# Patient Record
Sex: MALE | Race: WHITE | NOT HISPANIC OR LATINO | Employment: FULL TIME | URBAN - METROPOLITAN AREA
[De-identification: names, ages, dates, MRNs, and addresses within clinical notes are randomized per-mention and may not be internally consistent; named-entity substitution may affect disease eponyms.]

---

## 2017-04-19 ENCOUNTER — HOSPITAL ENCOUNTER (OUTPATIENT)
Facility: MEDICAL CENTER | Age: 22
End: 2017-04-19
Attending: PHYSICIAN ASSISTANT
Payer: COMMERCIAL

## 2017-04-19 ENCOUNTER — OFFICE VISIT (OUTPATIENT)
Dept: URGENT CARE | Facility: PHYSICIAN GROUP | Age: 22
End: 2017-04-19
Payer: COMMERCIAL

## 2017-04-19 VITALS
TEMPERATURE: 99 F | SYSTOLIC BLOOD PRESSURE: 122 MMHG | RESPIRATION RATE: 16 BRPM | HEART RATE: 66 BPM | OXYGEN SATURATION: 100 % | BODY MASS INDEX: 24.98 KG/M2 | WEIGHT: 179 LBS | DIASTOLIC BLOOD PRESSURE: 88 MMHG

## 2017-04-19 DIAGNOSIS — J02.9 EXUDATIVE PHARYNGITIS: ICD-10-CM

## 2017-04-19 LAB
INT CON NEG: NEGATIVE
INT CON POS: POSITIVE
S PYO AG THROAT QL: NORMAL

## 2017-04-19 PROCEDURE — 99214 OFFICE O/P EST MOD 30 MIN: CPT | Performed by: PHYSICIAN ASSISTANT

## 2017-04-19 PROCEDURE — 87070 CULTURE OTHR SPECIMN AEROBIC: CPT

## 2017-04-19 PROCEDURE — 87880 STREP A ASSAY W/OPTIC: CPT | Performed by: PHYSICIAN ASSISTANT

## 2017-04-19 RX ORDER — AMOXICILLIN 875 MG/1
875 TABLET, COATED ORAL 2 TIMES DAILY
Qty: 20 TAB | Refills: 0 | Status: SHIPPED | OUTPATIENT
Start: 2017-04-19 | End: 2018-01-30

## 2017-04-19 NOTE — Clinical Note
April 19, 2017         Patient: Keanu Trivedi   YOB: 1995   Date of Visit: 4/19/2017           To Whom it May Concern:    Keanu Trivedi was seen in my clinic on 4/19/2017.  Please excuse him from missed school today    If you have any questions or concerns, please don't hesitate to call.        Sincerely,           Silvia Spicer PA-C  Electronically Signed

## 2017-04-19 NOTE — MR AVS SNAPSHOT
Keanu Trivedi   2017 6:15 PM   Office Visit   MRN: [id number]    Department:  [facility]   Dept Phone:  [phone]    Description:  Male : 1995   Provider:  Silvia Spicer PA-C           Reason for Visit     Pharyngitis sore throat x 6 days      Allergies as of 2017     No Known Allergies      You were diagnosed with     Exudative pharyngitis   [[id number]]         Vital Signs     Blood Pressure Pulse Temperature Respirations Weight Oxygen Saturation    122/88 mmHg 66 37.2 °C (99 °F) 16 81.194 kg (179 lb) 100%    Smoking Status                   Never Smoker            Basic Information     Date Of Birth Sex Race Ethnicity Preferred Language    1995 Male White Non- English      Health Maintenance        Date Due Completion Dates    IMM HEP B VACCINE (1 of 3 - Primary Series) 1995 ---    IMM HEP A VACCINE (1 of 2 - Standard Series) 1996 ---    IMM HPV VACCINE (1 of 3 - Male 3 Dose Series) 2006 ---    IMM VARICELLA (CHICKENPOX) VACCINE (1 of 2 - 2 Dose Adolescent Series) 2008 ---    IMM DTaP/Tdap/Td Vaccine (1 - Tdap) 2014 ---            Current Immunizations     No immunizations on file.      Below and/or attached are the medications your provider expects you to take. Review all of your home medications and newly ordered medications with your provider and/or pharmacist. Follow medication instructions as directed by your provider and/or pharmacist. Please keep your medication list with you and share with your provider. Update the information when medications are discontinued, doses are changed, or new medications (including over-the-counter products) are added; and carry medication information at all times in the event of emergency situations     Allergies:  No Known Allergies          Medications  Valid as of: 2017 -  7:03 PM    Generic Name Brand Name Tablet Size Instructions for use    Amoxicillin (Tab) AMOXIL 875 MG Take 1 Tab by mouth 2  times a day.        Cetirizine HCl (Tab) ZYRTEC 10 MG Take 10 mg by mouth every day.        Erythromycin (Ointment) erythromycin 5 MG/GM Place 1 Application in right eye 3 times a day.        TraMADol HCl (Tab) ULTRAM 50 MG Take 1-2 Tabs by mouth every 6 hours as needed.        .                 Medicines prescribed today were sent to:     [facility] [address] [place] [address], [place] - [address] [facility]    [address] [facility] [place] [address]    Phone: [phone] Fax: [phone]    Open 24 Hours?: No      Medication refill instructions:       If your prescription bottle indicates you have medication refills left, it is not necessary to call your provider’s office. Please contact your pharmacy and they will refill your medication.    If your prescription bottle indicates you do not have any refills left, you may request refills at any time through one of the following ways: The online Sendmebox system (except Urgent Care), by calling your provider’s office, or by asking your pharmacy to contact your provider’s office with a refill request. Medication refills are processed only during regular business hours and may not be available until the next business day. Your provider may request additional information or to have a follow-up visit with you prior to refilling your medication.   *Please Note: Medication refills are assigned a new Rx number when refilled electronically. Your pharmacy may indicate that no refills were authorized even though a new prescription for the same medication is available at the pharmacy. Please request the medicine by name with the pharmacy before contacting your provider for a refill.        Your To Do List     Future Labs/Procedures Complete By Expires    CULTURE THROAT  As directed 4/19/2018         Sendmebox Access Code: [email or web address]  Expires: 5/19/2017  7:03 PM    Sendmebox  A secure, online tool to manage your health information     [employer]’s Sendmebox® is a secure, online tool  that connects you to your personalized health information from the privacy of your home -- day or night - making it very easy for you to manage your healthcare. Once the activation process is completed, you can even access your medical information using the Diana haroon, which is available for free in the Apple Haroon store or Google Play store.     Diana provides the following levels of access (as shown below):   My Chart Features   [facility] Doctor [facility]  Specialists [facility]  Urgent  Care Non-[facility]  Primary Care  Doctor   Email your healthcare team securely and privately 24/7 X X X    Manage appointments: schedule your next appointment; view details of past/upcoming appointments X      Request prescription refills. X      View recent personal medical records, including lab and immunizations X X X X   View health record, including health history, allergies, medications X X X X   Read reports about your outpatient visits, procedures, consult and ER notes X X X X   See your discharge summary, which is a recap of your hospital and/or ER visit that includes your diagnosis, lab results, and care plan. X X       How to register for Diana:  1. Go to  [email or web address].  2. Click on the Sign Up Now box, which takes you to the New Member Sign Up page. You will need to provide the following information:  a. Enter your Diana Access Code exactly as it appears at the top of this page. (You will not need to use this code after you’ve completed the sign-up process. If you do not sign up before the expiration date, you must request a new code.)   b. Enter your date of birth.   c. Enter your home email address.   d. Click Submit, and follow the next screen’s instructions.  3. Create a Diana ID. This will be your Diana login ID and cannot be changed, so think of one that is secure and easy to remember.  4. Create a Diana password. You can change your password at any time.  5. Enter your Password Reset  Question and Answer. This can be used at a later time if you forget your password.   6. Enter your e-mail address. This allows you to receive e-mail notifications when new information is available in [employer].  7. Click Sign Up. You can now view your health information.    For assistance activating your [employer] account, call [phone]

## 2017-04-20 DIAGNOSIS — J02.9 EXUDATIVE PHARYNGITIS: ICD-10-CM

## 2017-04-20 NOTE — PROGRESS NOTES
Chief Complaint   Patient presents with   • Pharyngitis     sore throat x 6 days       HISTORY OF PRESENT ILLNESS: Patient is a 22 y.o. male who presents today for 6 days of sore throat, headache here and there and then for a few days he was getting hot and cold but no confirmed fevers.    The throat is not scratchy but more diffusely aching and sore.   Today he woke up and it was just as above and felt that going on 6 days he should get it checked out.  He has had body aches as well.  His GF is sick with similar however she seems to be a bit better.  No nausea, vomiting, abdominal pain.  No overt fatigue.  He has not attempted any OTC interventions.     There are no active problems to display for this patient.      Allergies:Review of patient's allergies indicates no known allergies.    Current Outpatient Prescriptions Ordered in [facility]   Medication Sig Dispense Refill   • cetirizine (ZYRTEC) 10 MG Tab Take 10 mg by mouth every day.     • erythromycin 5 MG/GM Ointment Place 1 Application in right eye 3 times a day. 1 Tube 0   • tramadol (ULTRAM) 50 MG Tab Take 1-2 Tabs by mouth every 6 hours as needed. 10 Tab 0     No current Epic-ordered facility-administered medications on file.       History reviewed. No pertinent past medical history.    Social History   Substance Use Topics   • Smoking status: Never Smoker    • Smokeless tobacco: Never Used   • Alcohol Use: None       No family status information on file.   History reviewed. No pertinent family history.    ROS:  Review of Systems   Constitutional: SEE HPI  HENT: SEE HPI  Eyes: Negative for blurred vision.   Respiratory: Negative for cough, sputum production, shortness of breath and wheezing.    Cardiovascular: Negative for chest pain, palpitations, orthopnea and leg swelling.   Gastrointestinal: Negative for heartburn, nausea, vomiting and abdominal pain.   All other systems reviewed and are negative.       Exam:  Blood pressure 122/88, pulse 66, temperature  37.2 °C (99 °F), resp. rate 16, weight 81.194 kg (179 lb), SpO2 100 %.  General:  Well nourished, well developed male in NAD  Eyes: PERRLA, EOM within normal limits, no conjunctival injection, no scleral icterus, visual fields and acuity grossly intact.  Ears: Normal shape and symmetry, no tenderness, no discharge. External canals are without any significant edema or erythema. Tympanic membranes are without any inflammation, no effusion. Gross auditory acuity is intact  Nose: Symmetrical, sinuses without tenderness, no discharge.   Mouth: reasonable hygiene, moderate diffuse erythema with exudates and minimal bilateral tonsillar enlargement.  Neck: no masses, range of motion within normal limits, no tracheal deviation. No lymphadenopathy  Pulmonary: Normal respiratory effort, no wheezes, crackles, or rhonchi.  Cardiovascular: regular rate and rhythm without murmurs, rubs, or gallops.  Skin: No visible rashes or lesion. Warm, pink, dry.   Extremities: no clubbing, cyanosis, or edema.  Neuro: A&O x 3. Speech normal/clear.  Normal gait.         Assessment/Plan:  1. Exudative pharyngitis  CULTURE THROAT    POCT Rapid Strep A    amoxicillin (AMOXIL) 875 MG tablet       -POCT strep -NEG, culture taken, hx and px consistent with bacterial tonsillitis and we agree to start empiric abx.   Will monitor culture and call as results come in.   -fluids emphasized. Alternating Tylenol/Motrin prn pain/inflammation/fever  -new tooth brush.   School note provided.    -RTC precautions discussed such as worsening sore throat despite abx, worsening fevers, increased difficulty swallowing or breathing, drooling, etc.     Supportive care, differential diagnoses, and indications for immediate follow-up discussed with patient.   Pathogenesis of diagnosis discussed including typical length and natural progression.   Instructed to return to clinic or nearest emergency department for any change in condition, further concerns, or worsening of  symptoms.  Patient states understanding of the plan of care and discharge instructions.      Silvia Spicer PA-C

## 2017-04-22 LAB
BACTERIA SPEC RESP CULT: NORMAL
SIGNIFICANT IND 70042: NORMAL
SOURCE SOURCE: NORMAL

## 2018-01-23 ENCOUNTER — HOSPITAL ENCOUNTER (OUTPATIENT)
Facility: MEDICAL CENTER | Age: 23
End: 2018-01-23
Attending: EMERGENCY MEDICINE
Payer: COMMERCIAL

## 2018-01-23 ENCOUNTER — OFFICE VISIT (OUTPATIENT)
Dept: URGENT CARE | Facility: PHYSICIAN GROUP | Age: 23
End: 2018-01-23
Payer: COMMERCIAL

## 2018-01-23 VITALS
DIASTOLIC BLOOD PRESSURE: 78 MMHG | RESPIRATION RATE: 16 BRPM | WEIGHT: 173 LBS | HEIGHT: 71 IN | HEART RATE: 67 BPM | TEMPERATURE: 98.2 F | SYSTOLIC BLOOD PRESSURE: 124 MMHG | BODY MASS INDEX: 24.22 KG/M2 | OXYGEN SATURATION: 97 %

## 2018-01-23 DIAGNOSIS — J02.9 SORE THROAT: ICD-10-CM

## 2018-01-23 LAB
INT CON NEG: NORMAL
INT CON POS: NORMAL
S PYO AG THROAT QL: NEGATIVE

## 2018-01-23 PROCEDURE — 87070 CULTURE OTHR SPECIMN AEROBIC: CPT

## 2018-01-23 PROCEDURE — 87880 STREP A ASSAY W/OPTIC: CPT | Performed by: EMERGENCY MEDICINE

## 2018-01-23 PROCEDURE — 99214 OFFICE O/P EST MOD 30 MIN: CPT | Performed by: EMERGENCY MEDICINE

## 2018-01-23 RX ORDER — IBUPROFEN 200 MG
200 TABLET ORAL EVERY 6 HOURS PRN
COMMUNITY
End: 2018-11-06

## 2018-01-23 NOTE — LETTER
January 23, 2018        Keanu Trivedi  [id number]  [place] [address]        Dear Keanu:    Please ask for the next two days off from work for medical reasons.    If you have any questions or concerns, please don't hesitate to call.        Sincerely,        Hermelindo Maier M.D.    Electronically Signed

## 2018-01-24 ASSESSMENT — ENCOUNTER SYMPTOMS
SORE THROAT: 1
SPUTUM PRODUCTION: 0
ABDOMINAL PAIN: 0
FEVER: 1
BACK PAIN: 0
HEMOPTYSIS: 0
DIARRHEA: 0
NECK PAIN: 0
DIAPHORESIS: 0
STRIDOR: 0
EYE REDNESS: 0
EYE DISCHARGE: 0
VOMITING: 0
COUGH: 0

## 2018-01-25 ENCOUNTER — TELEPHONE (OUTPATIENT)
Dept: URGENT CARE | Facility: PHYSICIAN GROUP | Age: 23
End: 2018-01-25

## 2018-01-25 LAB
BACTERIA SPEC RESP CULT: NORMAL
SIGNIFICANT IND 70042: NORMAL
SITE SITE: NORMAL
SOURCE SOURCE: NORMAL

## 2018-01-25 NOTE — TELEPHONE ENCOUNTER
I contacted the patient left a message that his strep culture was negative and if symptoms persist to go ahead with the heterophile antibody test for mononucleosis.

## 2018-01-30 ENCOUNTER — OFFICE VISIT (OUTPATIENT)
Dept: URGENT CARE | Facility: PHYSICIAN GROUP | Age: 23
End: 2018-01-30
Payer: COMMERCIAL

## 2018-01-30 VITALS
BODY MASS INDEX: 23.8 KG/M2 | DIASTOLIC BLOOD PRESSURE: 82 MMHG | TEMPERATURE: 98.9 F | OXYGEN SATURATION: 99 % | SYSTOLIC BLOOD PRESSURE: 124 MMHG | HEIGHT: 71 IN | HEART RATE: 84 BPM | WEIGHT: 170 LBS

## 2018-01-30 DIAGNOSIS — J35.8 TONSILLAR EXUDATE: ICD-10-CM

## 2018-01-30 LAB
HETEROPH AB SER QL LA: NORMAL
INT CON NEG: NEGATIVE
INT CON NEG: NORMAL
INT CON POS: NORMAL
INT CON POS: POSITIVE
S PYO AG THROAT QL: NORMAL

## 2018-01-30 PROCEDURE — 87880 STREP A ASSAY W/OPTIC: CPT | Performed by: NURSE PRACTITIONER

## 2018-01-30 PROCEDURE — 99214 OFFICE O/P EST MOD 30 MIN: CPT | Performed by: NURSE PRACTITIONER

## 2018-01-30 PROCEDURE — 86308 HETEROPHILE ANTIBODY SCREEN: CPT | Performed by: NURSE PRACTITIONER

## 2018-01-30 RX ORDER — AMOXICILLIN 500 MG/1
500 CAPSULE ORAL 3 TIMES DAILY
Qty: 30 CAP | Refills: 0 | Status: SHIPPED | OUTPATIENT
Start: 2018-01-30 | End: 2018-02-09

## 2018-01-30 ASSESSMENT — ENCOUNTER SYMPTOMS
NAUSEA: 0
TROUBLE SWALLOWING: 0
COUGH: 0
CHILLS: 0
NECK PAIN: 0
HEADACHES: 0
MYALGIAS: 0
SORE THROAT: 1
VOMITING: 0
SWOLLEN GLANDS: 1
FEVER: 0
SHORTNESS OF BREATH: 0
EYE PAIN: 0
DIZZINESS: 0

## 2018-01-30 NOTE — PROGRESS NOTES
"  Subjective:     Keanu Trivedi is a 22 y.o. male who presents for Sore Throat (x2days worse, white spots)  Patient was seen 1/23/17 for a sore throat. POC strep was negative and throat culture negative. Patient presents today with complaints of worsening sore throat, tonsillar exudate symptoms not improving with supportive care. Patient has not yet received mono test. Patient denies any fatigue or tiredness. Patient requesting \"oral antibiotics because 6 months ago he was treated and symptoms resolved with antibiotics despite negative throat culture\"     Pharyngitis    This is a recurrent problem. The current episode started 1 to 4 weeks ago. The problem has been gradually worsening. Neither side of throat is experiencing more pain than the other. There has been no fever. The pain is at a severity of 7/10. The pain is moderate. Associated symptoms include swollen glands. Pertinent negatives include no coughing, drooling, headaches, neck pain, shortness of breath, trouble swallowing or vomiting. He has had no exposure to strep or mono. He has tried acetaminophen, NSAIDs and gargles for the symptoms. The treatment provided no relief.   No past medical history on file.No past surgical history on file.  Social History     Social History   • Marital status: Single     Spouse name: N/A   • Number of children: N/A   • Years of education: N/A     Occupational History   • Not on file.     Social History Main Topics   • Smoking status: Never Smoker   • Smokeless tobacco: Never Used   • Alcohol use Not on file   • Drug use: No   • Sexual activity: Not on file     Other Topics Concern   • Not on file     Social History Narrative   • No narrative on file    No family history on file. Review of Systems   Constitutional: Negative for chills and fever.   HENT: Positive for sore throat. Negative for drooling and trouble swallowing.    Eyes: Negative for pain.   Respiratory: Negative for cough and shortness of breath.  " "  Cardiovascular: Negative for chest pain.   Gastrointestinal: Negative for nausea and vomiting.   Genitourinary: Negative for hematuria.   Musculoskeletal: Negative for myalgias and neck pain.   Skin: Negative for rash.   Neurological: Negative for dizziness and headaches.   No Known Allergies   Objective:   /82   Pulse 84   Temp 37.2 °C (98.9 °F)   Ht 1.803 m (5' 11\")   Wt 77.1 kg (170 lb)   SpO2 99%   BMI 23.71 kg/m²   Physical Exam   Constitutional: He is oriented to person, place, and time. He appears well-developed and well-nourished. No distress.   HENT:   Head: Normocephalic and atraumatic.   Right Ear: Tympanic membrane normal.   Left Ear: Tympanic membrane normal.   Nose: Nose normal. Right sinus exhibits no maxillary sinus tenderness and no frontal sinus tenderness. Left sinus exhibits no maxillary sinus tenderness and no frontal sinus tenderness.   Mouth/Throat: Uvula is midline and mucous membranes are normal. Posterior oropharyngeal edema and posterior oropharyngeal erythema present. No tonsillar abscesses. Tonsils are 2+ on the right. Tonsils are 2+ on the left. Tonsillar exudate.   Eyes: Conjunctivae and EOM are normal. Pupils are equal, round, and reactive to light. Right eye exhibits no discharge. Left eye exhibits no discharge.   Cardiovascular: Normal rate and regular rhythm.    No murmur heard.  Pulmonary/Chest: Effort normal and breath sounds normal. No respiratory distress.   Abdominal: Soft. He exhibits no distension. There is no tenderness.   Neurological: He is alert and oriented to person, place, and time. He has normal reflexes. No sensory deficit.   Skin: Skin is warm, dry and intact.   Psychiatric: He has a normal mood and affect.         Assessment/Plan:   Assessment    1. Tonsillar exudate  - POCT Mononucleosis (mono)  - POCT Rapid Strep A  - amoxicillin (AMOXIL) 500 MG Cap; Take 1 Cap by mouth 3 times a day for 10 days.  Dispense: 30 Cap; Refill: 0  - mag hydrox-al " hydrox-simeth-diphenhydrAMINE-lidocaine viscous 2%; Take 30 mL by mouth as needed.  Dispense: 1 Bottle; Refill: 0  - REFERRAL TO ENT      Strep negative  Mono negative    Patient still symptomatic for the past week. We'll treat for suspected bacterial infection with oral antibiotics.    Advised to continue supportive care with Tylenol and/or ibuprofen for fevers and discomfort. Increased fluids and electrolytes.    Patient given precautionary s/sx that mandate immediate follow up and evaluation in the ED. Advised of risks of not doing so.    DDX, Supportive care, and indications for immediate follow-up discussed with patient.    Instructed to return to clinic or nearest emergency department if we are not available for any change in condition, further concerns, or worsening of symptoms.    The patient demonstrated a good understanding and agreed with the treatment plan.

## 2018-10-11 ENCOUNTER — OFFICE VISIT (OUTPATIENT)
Dept: MEDICAL GROUP | Facility: PHYSICIAN GROUP | Age: 23
End: 2018-10-11
Payer: COMMERCIAL

## 2018-10-11 VITALS
DIASTOLIC BLOOD PRESSURE: 84 MMHG | TEMPERATURE: 98.7 F | SYSTOLIC BLOOD PRESSURE: 122 MMHG | WEIGHT: 170 LBS | RESPIRATION RATE: 20 BRPM | OXYGEN SATURATION: 97 % | BODY MASS INDEX: 23.03 KG/M2 | HEIGHT: 72 IN | HEART RATE: 66 BPM

## 2018-10-11 DIAGNOSIS — J30.2 SEASONAL ALLERGIES: ICD-10-CM

## 2018-10-11 DIAGNOSIS — R51.9 GENERALIZED HEADACHES: ICD-10-CM

## 2018-10-11 DIAGNOSIS — Z23 NEED FOR VACCINATION: ICD-10-CM

## 2018-10-11 DIAGNOSIS — Z76.89 ENCOUNTER TO ESTABLISH CARE WITH NEW DOCTOR: ICD-10-CM

## 2018-10-11 PROCEDURE — 99212 OFFICE O/P EST SF 10 MIN: CPT | Mod: 25 | Performed by: NURSE PRACTITIONER

## 2018-10-11 PROCEDURE — 90715 TDAP VACCINE 7 YRS/> IM: CPT | Performed by: NURSE PRACTITIONER

## 2018-10-11 PROCEDURE — 90471 IMMUNIZATION ADMIN: CPT | Performed by: NURSE PRACTITIONER

## 2018-10-11 ASSESSMENT — PATIENT HEALTH QUESTIONNAIRE - PHQ9: CLINICAL INTERPRETATION OF PHQ2 SCORE: 0

## 2018-10-11 NOTE — PROGRESS NOTES
Chief Complaint   Patient presents with   • Establish Care   • Headache     x 3weeks       HISTORY OF PRESENT ILLNESS: Patient is a 23 y.o. male new patient who presents today to discuss the following issues:    Encounter to establish care with new doctor  Is here to establish with a new primary care provider.      Seasonal allergies  Allergies are controlled with Zyrtec.    Generalized headaches  Was having headaches when he scheduled his appointment, but they have resolved.  He thinks that they were related to his allergies, as well as being on his computer more.    Need for vaccination  Due for Tdap.      Patient Active Problem List    Diagnosis Date Noted   • Encounter to establish care with new doctor 10/11/2018   • Generalized headaches 10/11/2018   • Seasonal allergies 10/11/2018   • Need for vaccination 10/11/2018       Allergies:Patient has no known allergies.    Current Outpatient Prescriptions   Medication Sig Dispense Refill   • ibuprofen (MOTRIN) 200 MG Tab Take 200 mg by mouth every 6 hours as needed.     • cetirizine (ZYRTEC) 10 MG Tab Take 10 mg by mouth every day.       No current facility-administered medications for this visit.        Social History   Substance Use Topics   • Smoking status: Never Smoker   • Smokeless tobacco: Never Used   • Alcohol use Yes      Comment: rare       Family Status   Relation Status   • Mo Alive   • Fa Alive     Family History   Problem Relation Age of Onset   • Heart Disease Mother    • Rheumatologic Disease Mother         aortitis       Review of Systems:   Constitutional: Negative for fever, chills, weight loss and malaise/fatigue.   HENT: Negative for ear pain, nosebleeds, congestion, sore throat and neck pain.  Positive for seasonal allergies.  Eyes: Negative for blurred vision.   Respiratory: Negative for cough, sputum production, shortness of breath and wheezing.    Cardiovascular: Negative for chest pain, palpitations, orthopnea and leg swelling.  "  Gastrointestinal: Negative for heartburn, nausea, vomiting and abdominal pain.   Genitourinary: Negative for dysuria, urgency and frequency.   Musculoskeletal: Negative for myalgias, joint pain, and back pain.  Skin: Negative for rash and itching.   Neurological: Negative for dizziness, tingling, tremors, sensory change, focal weakness and headaches.   Endo/Heme/Allergies: Does not bruise/bleed easily.   Psychiatric/Behavioral: Negative for depression, suicidal ideas and memory loss.  The patient is not nervous/anxious and does not have insomnia.    All other systems reviewed and are negative except as in HPI.    Exam:  Blood pressure 122/84, pulse 66, temperature 37.1 °C (98.7 °F), temperature source Temporal, resp. rate 20, height 1.816 m (5' 11.5\"), weight 77.1 kg (170 lb), SpO2 97 %.  General:  Well nourished, well developed male in NAD  Head: Grossly normal.  Neck: Supple without JVD or bruit. Thyroid is not enlarged.  Pulmonary: Clear to ausculation. Normal effort. No rales, ronchi, or wheezing.  Cardiovascular: Regular rate and rhythm without murmur.   Abdomen:  Bowel sounds + x 4. Soft, non-tender, nondistended.  Extremities: No clubbing, cyanosis, or edema.  Skin: Intact with no obvious rashes or lesions.  Neuro: Grossly intact.  Psych: Alert and oriented x 3.  Mood and affect appropriate.    Medical decision-making and discussion: Keanu is here to establish with a new primary care provider.  We reviewed his past medical history and discussed his current medications.  He was given a Tdap.. He will sign a records release for his previous provider, [facility], and he will plan to follow-up here as needed.       I have placed the below orders and discussed them with an approved delegating provider. The MA is performing the below orders under the direction of Dr. Miranda, who have provided verbal consent for supervision.          Assessment/Plan:  1. Encounter to establish care with new doctor "     2. Generalized headaches     3. Seasonal allergies     4. Need for vaccination  Tdap =>6yo IM       Return if symptoms worsen or fail to improve.    Please note that this dictation was created using voice recognition software. I have made every reasonable attempt to correct obvious errors, but I expect that there are errors of grammar and possibly content that I did not discover before finalizing the note.

## 2018-10-11 NOTE — ASSESSMENT & PLAN NOTE
Was having headaches when he scheduled his appointment, but they have resolved.  He thinks that they were related to his allergies, as well as being on his computer more.

## 2018-11-06 ENCOUNTER — OFFICE VISIT (OUTPATIENT)
Dept: URGENT CARE | Facility: PHYSICIAN GROUP | Age: 23
End: 2018-11-06
Payer: COMMERCIAL

## 2018-11-06 ENCOUNTER — HOSPITAL ENCOUNTER (OUTPATIENT)
Dept: RADIOLOGY | Facility: MEDICAL CENTER | Age: 23
End: 2018-11-06
Attending: FAMILY MEDICINE
Payer: COMMERCIAL

## 2018-11-06 VITALS
HEART RATE: 93 BPM | TEMPERATURE: 97.6 F | HEIGHT: 71 IN | RESPIRATION RATE: 12 BRPM | OXYGEN SATURATION: 98 % | DIASTOLIC BLOOD PRESSURE: 92 MMHG | BODY MASS INDEX: 23.94 KG/M2 | WEIGHT: 171 LBS | SYSTOLIC BLOOD PRESSURE: 142 MMHG

## 2018-11-06 DIAGNOSIS — R51.9 ACUTE NONINTRACTABLE HEADACHE, UNSPECIFIED HEADACHE TYPE: ICD-10-CM

## 2018-11-06 PROCEDURE — 72040 X-RAY EXAM NECK SPINE 2-3 VW: CPT

## 2018-11-06 PROCEDURE — 99214 OFFICE O/P EST MOD 30 MIN: CPT | Performed by: FAMILY MEDICINE

## 2018-11-06 RX ORDER — MELOXICAM 15 MG/1
15 TABLET ORAL DAILY
Qty: 30 TAB | Refills: 0 | Status: ON HOLD | OUTPATIENT
Start: 2018-11-06 | End: 2018-11-30

## 2018-11-06 RX ORDER — CYCLOBENZAPRINE HCL 10 MG
10 TABLET ORAL
Qty: 15 TAB | Refills: 0 | Status: ON HOLD | OUTPATIENT
Start: 2018-11-06 | End: 2018-11-30

## 2018-11-14 ASSESSMENT — ENCOUNTER SYMPTOMS
HEADACHES: 1
NECK PAIN: 1
NUMBNESS: 1
WEAKNESS: 1
PHOTOPHOBIA: 0
FEVER: 0

## 2018-11-14 NOTE — PROGRESS NOTES
"Subjective:   Keanu Trivedi is a 23 y.o. male who presents for Headache (with neck pain and left hand weakness x 1 week headache increased today with weakness increasing in the left arm )        Headache    This is a new problem. The current episode started in the past 7 days. The problem occurs constantly. The problem has been rapidly worsening. The pain is located in the left unilateral and occipital region. The pain is moderate. Associated symptoms include neck pain, numbness and weakness. Pertinent negatives include no fever, phonophobia or photophobia.     Review of Systems   Constitutional: Negative for fever.   Eyes: Negative for photophobia.   Musculoskeletal: Positive for neck pain.   Neurological: Positive for weakness, numbness and headaches.     No Known Allergies   Objective:   /92 (BP Location: Left arm, Patient Position: Sitting, BP Cuff Size: Adult)   Pulse 93   Temp 36.4 °C (97.6 °F) (Tympanic)   Resp 12   Ht 1.803 m (5' 11\")   Wt 77.6 kg (171 lb)   SpO2 98%   BMI 23.85 kg/m²   Physical Exam   Constitutional: He is oriented to person, place, and time. He appears well-developed and well-nourished. No distress.   HENT:   Head: Normocephalic and atraumatic.   Eyes: Pupils are equal, round, and reactive to light. Conjunctivae and EOM are normal.   Cardiovascular: Normal rate, regular rhythm and intact distal pulses.    No murmur heard.  Pulmonary/Chest: Effort normal and breath sounds normal. No respiratory distress.   Abdominal: Soft. He exhibits no distension. There is no tenderness.   Musculoskeletal:        Cervical back: He exhibits decreased range of motion, tenderness and spasm. He exhibits no bony tenderness, no swelling and no edema.   Neurological: He is alert and oriented to person, place, and time. He has normal reflexes. No sensory deficit.   Skin: Skin is warm and dry.   Psychiatric: He has a normal mood and affect. His behavior is normal.   Vitals reviewed.      "   Assessment/Plan:   1. Acute nonintractable headache, unspecified headache type  - meloxicam (MOBIC) 15 MG tablet; Take 1 Tab by mouth every day.  Dispense: 30 Tab; Refill: 0  - cyclobenzaprine (FLEXERIL) 10 MG Tab; Take 1 Tab by mouth at bedtime as needed.  Dispense: 15 Tab; Refill: 0  - DX-CERVICAL SPINE-2 OR 3 VIEWS; Future  - REFERRAL TO SPINE SURGERY    Differential diagnosis, natural history, supportive care, and indications for immediate follow-up discussed.

## 2018-11-15 ENCOUNTER — APPOINTMENT (OUTPATIENT)
Dept: RADIOLOGY | Facility: MEDICAL CENTER | Age: 23
DRG: 180 | End: 2018-11-15
Attending: EMERGENCY MEDICINE
Payer: COMMERCIAL

## 2018-11-15 ENCOUNTER — HOSPITAL ENCOUNTER (INPATIENT)
Facility: MEDICAL CENTER | Age: 23
LOS: 6 days | DRG: 180 | End: 2018-11-21
Attending: EMERGENCY MEDICINE | Admitting: HOSPITALIST
Payer: COMMERCIAL

## 2018-11-15 PROBLEM — G93.9 LESION OF RIGHT FRONTAL LOBE OF BRAIN: Status: ACTIVE | Noted: 2018-11-15

## 2018-11-15 LAB
ANION GAP SERPL CALC-SCNC: 9 MMOL/L (ref 0–11.9)
APTT PPP: 37.8 SEC (ref 24.7–36)
BASOPHILS # BLD AUTO: 0.5 % (ref 0–1.8)
BASOPHILS # BLD: 0.03 K/UL (ref 0–0.12)
BUN SERPL-MCNC: 26 MG/DL (ref 8–22)
CALCIUM SERPL-MCNC: 9.9 MG/DL (ref 8.5–10.5)
CHLORIDE SERPL-SCNC: 105 MMOL/L (ref 96–112)
CO2 SERPL-SCNC: 26 MMOL/L (ref 20–33)
CREAT SERPL-MCNC: 0.99 MG/DL (ref 0.5–1.4)
EOSINOPHIL # BLD AUTO: 0.24 K/UL (ref 0–0.51)
EOSINOPHIL NFR BLD: 3.7 % (ref 0–6.9)
ERYTHROCYTE [DISTWIDTH] IN BLOOD BY AUTOMATED COUNT: 35 FL (ref 35.9–50)
GLUCOSE SERPL-MCNC: 84 MG/DL (ref 65–99)
HCT VFR BLD AUTO: 47.5 % (ref 42–52)
HGB BLD-MCNC: 16 G/DL (ref 14–18)
IMM GRANULOCYTES # BLD AUTO: 0.02 K/UL (ref 0–0.11)
IMM GRANULOCYTES NFR BLD AUTO: 0.3 % (ref 0–0.9)
INR PPP: 1.1 (ref 0.87–1.13)
LYMPHOCYTES # BLD AUTO: 1.41 K/UL (ref 1–4.8)
LYMPHOCYTES NFR BLD: 21.6 % (ref 22–41)
MAGNESIUM SERPL-MCNC: 1.9 MG/DL (ref 1.5–2.5)
MCH RBC QN AUTO: 27.5 PG (ref 27–33)
MCHC RBC AUTO-ENTMCNC: 33.7 G/DL (ref 33.7–35.3)
MCV RBC AUTO: 81.6 FL (ref 81.4–97.8)
MONOCYTES # BLD AUTO: 0.51 K/UL (ref 0–0.85)
MONOCYTES NFR BLD AUTO: 7.8 % (ref 0–13.4)
NEUTROPHILS # BLD AUTO: 4.31 K/UL (ref 1.82–7.42)
NEUTROPHILS NFR BLD: 66.1 % (ref 44–72)
NRBC # BLD AUTO: 0 K/UL
NRBC BLD-RTO: 0 /100 WBC
PLATELET # BLD AUTO: 255 K/UL (ref 164–446)
PMV BLD AUTO: 8.3 FL (ref 9–12.9)
POTASSIUM SERPL-SCNC: 4 MMOL/L (ref 3.6–5.5)
PROTHROMBIN TIME: 14.3 SEC (ref 12–14.6)
RBC # BLD AUTO: 5.82 M/UL (ref 4.7–6.1)
SODIUM SERPL-SCNC: 140 MMOL/L (ref 135–145)
WBC # BLD AUTO: 6.5 K/UL (ref 4.8–10.8)

## 2018-11-15 PROCEDURE — 770006 HCHG ROOM/CARE - MED/SURG/GYN SEMI*

## 2018-11-15 PROCEDURE — 85610 PROTHROMBIN TIME: CPT

## 2018-11-15 PROCEDURE — 83735 ASSAY OF MAGNESIUM: CPT

## 2018-11-15 PROCEDURE — 700111 HCHG RX REV CODE 636 W/ 250 OVERRIDE (IP): Performed by: EMERGENCY MEDICINE

## 2018-11-15 PROCEDURE — 700111 HCHG RX REV CODE 636 W/ 250 OVERRIDE (IP): Performed by: STUDENT IN AN ORGANIZED HEALTH CARE EDUCATION/TRAINING PROGRAM

## 2018-11-15 PROCEDURE — 99285 EMERGENCY DEPT VISIT HI MDM: CPT

## 2018-11-15 PROCEDURE — 36415 COLL VENOUS BLD VENIPUNCTURE: CPT

## 2018-11-15 PROCEDURE — 85025 COMPLETE CBC W/AUTO DIFF WBC: CPT

## 2018-11-15 PROCEDURE — 96374 THER/PROPH/DIAG INJ IV PUSH: CPT

## 2018-11-15 PROCEDURE — 80048 BASIC METABOLIC PNL TOTAL CA: CPT

## 2018-11-15 PROCEDURE — 700117 HCHG RX CONTRAST REV CODE 255: Performed by: EMERGENCY MEDICINE

## 2018-11-15 PROCEDURE — 99223 1ST HOSP IP/OBS HIGH 75: CPT | Mod: GC | Performed by: HOSPITALIST

## 2018-11-15 PROCEDURE — A9585 GADOBUTROL INJECTION: HCPCS | Performed by: EMERGENCY MEDICINE

## 2018-11-15 PROCEDURE — 85730 THROMBOPLASTIN TIME PARTIAL: CPT

## 2018-11-15 PROCEDURE — 70450 CT HEAD/BRAIN W/O DYE: CPT

## 2018-11-15 PROCEDURE — 70553 MRI BRAIN STEM W/O & W/DYE: CPT

## 2018-11-15 RX ORDER — GADOBUTROL 604.72 MG/ML
7.5 INJECTION INTRAVENOUS ONCE
Status: COMPLETED | OUTPATIENT
Start: 2018-11-15 | End: 2018-11-15

## 2018-11-15 RX ORDER — DEXAMETHASONE SODIUM PHOSPHATE 4 MG/ML
10 INJECTION, SOLUTION INTRA-ARTICULAR; INTRALESIONAL; INTRAMUSCULAR; INTRAVENOUS; SOFT TISSUE ONCE
Status: COMPLETED | OUTPATIENT
Start: 2018-11-15 | End: 2018-11-15

## 2018-11-15 RX ORDER — AMOXICILLIN 250 MG
2 CAPSULE ORAL 2 TIMES DAILY
Status: DISCONTINUED | OUTPATIENT
Start: 2018-11-15 | End: 2018-11-21 | Stop reason: HOSPADM

## 2018-11-15 RX ORDER — FAMOTIDINE 20 MG/1
20 TABLET, FILM COATED ORAL 2 TIMES DAILY
Status: DISCONTINUED | OUTPATIENT
Start: 2018-11-15 | End: 2018-11-16

## 2018-11-15 RX ORDER — DEXAMETHASONE SODIUM PHOSPHATE 4 MG/ML
4 INJECTION, SOLUTION INTRA-ARTICULAR; INTRALESIONAL; INTRAMUSCULAR; INTRAVENOUS; SOFT TISSUE EVERY 6 HOURS
Status: DISCONTINUED | OUTPATIENT
Start: 2018-11-15 | End: 2018-11-16

## 2018-11-15 RX ORDER — LABETALOL HYDROCHLORIDE 5 MG/ML
10 INJECTION, SOLUTION INTRAVENOUS EVERY 4 HOURS PRN
Status: DISCONTINUED | OUTPATIENT
Start: 2018-11-15 | End: 2018-11-21 | Stop reason: HOSPADM

## 2018-11-15 RX ORDER — BISACODYL 10 MG
10 SUPPOSITORY, RECTAL RECTAL
Status: DISCONTINUED | OUTPATIENT
Start: 2018-11-15 | End: 2018-11-21 | Stop reason: HOSPADM

## 2018-11-15 RX ORDER — ACETAMINOPHEN 325 MG/1
650 TABLET ORAL EVERY 6 HOURS PRN
Status: DISCONTINUED | OUTPATIENT
Start: 2018-11-15 | End: 2018-11-21 | Stop reason: HOSPADM

## 2018-11-15 RX ORDER — POLYETHYLENE GLYCOL 3350 17 G/17G
1 POWDER, FOR SOLUTION ORAL
Status: DISCONTINUED | OUTPATIENT
Start: 2018-11-15 | End: 2018-11-21 | Stop reason: HOSPADM

## 2018-11-15 RX ADMIN — DEXAMETHASONE SODIUM PHOSPHATE 4 MG: 4 INJECTION, SOLUTION INTRA-ARTICULAR; INTRALESIONAL; INTRAMUSCULAR; INTRAVENOUS; SOFT TISSUE at 21:09

## 2018-11-15 RX ADMIN — GADOBUTROL 7.5 ML: 604.72 INJECTION INTRAVENOUS at 18:18

## 2018-11-15 RX ADMIN — DEXAMETHASONE SODIUM PHOSPHATE 10 MG: 4 INJECTION, SOLUTION INTRA-ARTICULAR; INTRALESIONAL; INTRAMUSCULAR; INTRAVENOUS; SOFT TISSUE at 17:25

## 2018-11-15 ASSESSMENT — ENCOUNTER SYMPTOMS
FOCAL WEAKNESS: 1
ABDOMINAL PAIN: 0
NAUSEA: 0
SPEECH CHANGE: 0
DIAPHORESIS: 1
TINGLING: 0
WEAKNESS: 1
SHORTNESS OF BREATH: 0
DIARRHEA: 0
EYE REDNESS: 0
MYALGIAS: 0
DEPRESSION: 0
CHILLS: 0
MEMORY LOSS: 0
HEADACHES: 1
SINUS PAIN: 0
COUGH: 0
FEVER: 0
WEIGHT LOSS: 0
NERVOUS/ANXIOUS: 0
DOUBLE VISION: 0
SORE THROAT: 0
VOMITING: 0
BLURRED VISION: 1
BACK PAIN: 0
NECK PAIN: 1
TREMORS: 0
PALPITATIONS: 0
DIZZINESS: 0
SEIZURES: 0
CONSTIPATION: 0
ORTHOPNEA: 0
LOSS OF CONSCIOUSNESS: 0
PHOTOPHOBIA: 0
SENSORY CHANGE: 0

## 2018-11-15 ASSESSMENT — COGNITIVE AND FUNCTIONAL STATUS - GENERAL
SUGGESTED CMS G CODE MODIFIER DAILY ACTIVITY: CH
MOBILITY SCORE: 24
DAILY ACTIVITIY SCORE: 24
SUGGESTED CMS G CODE MODIFIER MOBILITY: CH

## 2018-11-15 ASSESSMENT — LIFESTYLE VARIABLES
EVER FELT BAD OR GUILTY ABOUT YOUR DRINKING: NO
CONSUMPTION TOTAL: NEGATIVE
TOTAL SCORE: 0
ALCOHOL_USE: YES
EVER_SMOKED: NEVER
AVERAGE NUMBER OF DAYS PER WEEK YOU HAVE A DRINK CONTAINING ALCOHOL: 0
TOTAL SCORE: 0
HAVE PEOPLE ANNOYED YOU BY CRITICIZING YOUR DRINKING: NO
ON A TYPICAL DAY WHEN YOU DRINK ALCOHOL HOW MANY DRINKS DO YOU HAVE: 1
TOTAL SCORE: 0
EVER HAD A DRINK FIRST THING IN THE MORNING TO STEADY YOUR NERVES TO GET RID OF A HANGOVER: NO
HOW MANY TIMES IN THE PAST YEAR HAVE YOU HAD 5 OR MORE DRINKS IN A DAY: 0
HAVE YOU EVER FELT YOU SHOULD CUT DOWN ON YOUR DRINKING: NO

## 2018-11-15 ASSESSMENT — PATIENT HEALTH QUESTIONNAIRE - PHQ9
2. FEELING DOWN, DEPRESSED, IRRITABLE, OR HOPELESS: NOT AT ALL
SUM OF ALL RESPONSES TO PHQ9 QUESTIONS 1 AND 2: 0
1. LITTLE INTEREST OR PLEASURE IN DOING THINGS: NOT AT ALL

## 2018-11-15 ASSESSMENT — COPD QUESTIONNAIRES
COPD SCREENING SCORE: 0
IN THE PAST 12 MONTHS DO YOU DO LESS THAN YOU USED TO BECAUSE OF YOUR BREATHING PROBLEMS: DISAGREE/UNSURE
DO YOU EVER COUGH UP ANY MUCUS OR PHLEGM?: NO/ONLY WITH OCCASIONAL COLDS OR INFECTIONS
HAVE YOU SMOKED AT LEAST 100 CIGARETTES IN YOUR ENTIRE LIFE: NO/DON'T KNOW
DURING THE PAST 4 WEEKS HOW MUCH DID YOU FEEL SHORT OF BREATH: NONE/LITTLE OF THE TIME

## 2018-11-15 ASSESSMENT — PAIN SCALES - GENERAL
PAINLEVEL_OUTOF10: 3
PAINLEVEL_OUTOF10: 7
PAINLEVEL_OUTOF10: 7

## 2018-11-15 NOTE — ED TRIAGE NOTES
Pt amb to triage w/ mom.  Chief Complaint   Patient presents with   • Headache     x1mo, getting worse   • Weakness     left arm x2d   • Facial Droop     left corner of mouth x2d

## 2018-11-15 NOTE — ED PROVIDER NOTES
ED Provider Note    Scribed for Jovanny Almazan M.D. by Devon Neves. 11/15/2018  3:01 PM    Primary Care Provider: KIAN Rod  Means of arrival: Walk-In  History limited by: None    CHIEF COMPLAINT  Chief Complaint   Patient presents with   • Headache     x1mo, getting worse   • Weakness     left arm x2d   • Facial Droop     left corner of mouth x2d       HPI  Keanu Trivedi is a 23 y.o. male who presents to the ED complaining of a constant, worsening temple and right-sided headaches onset one month ago. He reports that his headache is exacerbated if he stands up after having been sitting for a while. He also describes his headache as squeezing in quality and 8/10 in severity. Ten days ago he stood up at his work and felt a rush to his head and sat back down. After having sat down, he noticed numbness and tingling in his left hand that radiated up his arm.  He states the numbness lasted about a day but then since then his left arm is been weak.  He went to urgent care and was directed to the spinal center thinking that this might be from his neck..  Patient says he saw Dr. Manzano yesterday who felt it was not from his nap and possibly from his brain and requested that he get further workup and evaluation in the hospital for this.  She then presented today with the symptoms.  The numbness in hs left arm resolved after the initial day, but the weakness has persisted. Associated symptoms include right upper back pain, weakness in his left arm (onset ten days ago), numbness in his left arm (now resolved), rhinorrhea, slurred speech, and facial droop on the left corner of his mouth (onset two days ago). His mother states that ever since he went to urgent care, his speech has been slower and seems more forced. He denies any chest pain, new skin rashes, or confusion, but states that some things are harder to remember. He denies smoking, alcohol usage, and states that he has not had any recent sinus infections.  "    REVIEW OF SYSTEMS    CONSTITUTIONAL:  Denies fever, chills, weight gain/loss..  Positive right sided headache for 1 month.  Left facial droop for 10 days along with left arm weakness for 10 days  EYES:  Denies photophobia or discharge.   ENT:  Denies sore throat, nose, or ear pain.  CARDIOVASCULAR:  Denies chest pain, palpitations, or swelling.  RESPIRATORY:  Denies cough, shortness of breath, difficulty breathing. Positive for rhinorrhea.   GI:  Denies abdominal pain, nausea, vomiting, or diarrhea.  MUSCULOSKELETAL:  Denies joint swelling.  Left arm weakness..   SKIN:  No rash or bruising.  NEUROLOGIC:  Positive for headache, slurred speech (now resolved) left arm numbness (onset ten days ago but resolved), facial droop (left corner of mouth), and weakness to his left arm (onset 10 days ago).   PSYCHIATRIC:  Denies depression.    PAST MEDICAL HISTORY  Healthy male.  No recent trauma or travel.  No history of IV drug abuse    FAMILY HISTORY  Family History   Problem Relation Age of Onset   • Heart Disease Mother    • Rheumatologic Disease Mother         aortitis       SOCIAL HISTORY   reports that he has never smoked. He has never used smokeless tobacco. He reports that he drinks alcohol. He reports that he does not use drugs.    SURGICAL HISTORY  Past Surgical History:   Procedure Laterality Date   • OTHER ORTHOPEDIC SURGERY      foot repair       CURRENT MEDICATIONS  No current facility-administered medications for this encounter.     Current Outpatient Prescriptions:   •  meloxicam (MOBIC) 15 MG tablet, Take 1 Tab by mouth every day., Disp: 30 Tab, Rfl: 0  •  cyclobenzaprine (FLEXERIL) 10 MG Tab, Take 1 Tab by mouth at bedtime as needed., Disp: 15 Tab, Rfl: 0  •  cetirizine (ZYRTEC) 10 MG Tab, Take 10 mg by mouth every day., Disp: , Rfl:     ALLERGIES  No Known Allergies    PHYSICAL EXAM  VITAL SIGNS: /90   Pulse 98   Temp 37 °C (98.6 °F) (Temporal)   Resp 16   Ht 1.803 m (5' 11\")   Wt 77 kg (169 " lb 12.1 oz)   SpO2 97%   BMI 23.68 kg/m²      Constitutional: Patient is awake and alert. No acute respiratory distress. Well developed, Well nourished, Non-toxic appearance.  HENT: Normocephalic, Atraumatic, Bilateral external ears normal, Oropharynx pink moist with no exudates, Nose patent.  Left facial droop  Eyes: PERRLA, EOMI, Sclera and conjunctiva clear, No discharge. Pupils 6 mm  Neck:  Supple no nuchal rigidity, no thyromegaly or mass. Non-tender  Cardiovascular: Heart is regular rate and rhythm no murmur, rub or thrill.   Thorax & Lungs: Chest is symmetrical, with good breath sounds. No wheezing or crackles. No respiratory distress, No chest tenderness.   Abdomen: Soft, No tenderness no hepatosplenomegaly there is no guarding or rebound, No masses, No pulsatile masses.   Skin: Warm, Dry, no petechia, purpura, or rash.   Back: Non tender with palpation,   Extremities: No edema. Non tender.   Musculoskeletal: Good range of motion to wrists, elbows, shoulders, hips, knees, and ankles. Pulses 2+ radially and femorally. No gross deformities noted.  Although he has weakness to the left  and left bicep  Neurologic: Alert & oriented to person, place, and time. Strength is 5 over 5 right  bicep triceps, bilateral quadriceps, plantar, flexion, and extension on the right side, strength is 4+ over 5 on the left biceps and  side after testing with biceps against me. Sensory is intact to light touch to face, arms, and legs. DTRs are symmetrical to biceps, brachioradialis, patella, and Achilles.. Left-sided facial droop present. Slight drift to left arm.  NIH Score 2 (left facial droop and left arm weakness).  Psychiatric: Normal affect.    LABS  Lab Results   Component Value Date    WBC 6.5 11/15/2018    HEMOGLOBIN 16.0 11/15/2018    HEMATOCRIT 47.5 11/15/2018    PLATELETCT 255 11/15/2018    SODIUM 140 11/15/2018    POTASSIUM 4.0 11/15/2018    CHLORIDE 105 11/15/2018    CO2 26 11/15/2018    BUN 26 (H)  11/15/2018    GLUCOSE 84 11/15/2018    INR 1.10 11/15/2018       All labs reviewed by me.    RADIOLOGY/PROCEDURES  CT-HEAD W/O   Final Result      2.5 x 1.6 x 1.7 cm heterogeneous lesion in the right frontal lobe with significant surrounding edema, mass effect and 9 mm midline shift to the left.   Differential considerations include an abscess, primary or secondary malignancy.      Findings discussed with Dr. Almazan.      MR-BRAIN-WITH & W/O    (Results Pending)     The radiologist's interpretations of all radiological studies have been reviewed by me.     COURSE & MEDICAL DECISION MAKING  Pertinent Labs & Imaging studies reviewed. (See chart for details)        3:01 PM - Patient seen and examined at bedside. Ordered CT-Head, CBC, and BMP. Discussed with the patient and his mother who accompanied him that we would need to perform labs and a CT scan to find the etiology of his symptoms. They are agreeable with this treatment plan.     Decision Making  Patient has had a headache for a month.  Then developed left arm tingling and numbness for 1 day followed by 10 days of weakness to his left arm including  and bicep.  Also has a left facial droop.  Seen yesterday by spine surgery did not feel that it was from his neck but were very concerned this may be something in his brain.  He sent back to the emergency room.  Here in the emergency room we found that he has a large tumor with edema and midline shift.  I gave him 10 mg of Decadron here.  I discussed the case with the [place] internal medicine service and they will admit him to the hospital.    I discussed the case with Dr. Manzano neurosurgery and we discussed the MRI scans.  He will be admitted with medicine and they will give him Decadron 4 mg every 6 hours.  It is okay to feed him tonigh and they will consult on him.      FINAL IMPRESSION  1.  New onset brain tumor with edema    PLAN  1.  Admission [facility] internal medicine service  2.  Consultation per  neurosurgery  3.  MRI scans       IDevon (Scribe), am scribing for, and in the presence of, Jovanny Almazan M.D..    Electronically signed by: Devon Neves (Emeterio), 11/15/2018    IJovanny M.D. personally performed the services described in this documentation, as scribed by Devon Neves in my presence, and it is both accurate and complete. C>     The note accurately reflects work and decisions made by me.  Jovanny Almazan  11/15/2018  6:00 PM

## 2018-11-16 ENCOUNTER — APPOINTMENT (OUTPATIENT)
Dept: RADIOLOGY | Facility: MEDICAL CENTER | Age: 23
DRG: 180 | End: 2018-11-16
Attending: NEUROLOGICAL SURGERY
Payer: COMMERCIAL

## 2018-11-16 PROBLEM — R91.8 LUNG MASS: Status: ACTIVE | Noted: 2018-11-16

## 2018-11-16 PROBLEM — G93.6 CEREBRAL EDEMA (HCC): Status: ACTIVE | Noted: 2018-11-16

## 2018-11-16 PROBLEM — R90.89 MIDLINE SHIFT OF BRAIN: Status: ACTIVE | Noted: 2018-11-16

## 2018-11-16 LAB
ALBUMIN SERPL BCP-MCNC: 4.5 G/DL (ref 3.2–4.9)
ALBUMIN/GLOB SERPL: 1 G/DL
ALP SERPL-CCNC: 95 U/L (ref 30–99)
ALT SERPL-CCNC: 50 U/L (ref 2–50)
ANION GAP SERPL CALC-SCNC: 11 MMOL/L (ref 0–11.9)
AST SERPL-CCNC: 23 U/L (ref 12–45)
BASOPHILS # BLD AUTO: 0.2 % (ref 0–1.8)
BASOPHILS # BLD: 0.01 K/UL (ref 0–0.12)
BILIRUB SERPL-MCNC: 0.6 MG/DL (ref 0.1–1.5)
BUN SERPL-MCNC: 21 MG/DL (ref 8–22)
CALCIUM SERPL-MCNC: 10.6 MG/DL (ref 8.5–10.5)
CHLORIDE SERPL-SCNC: 102 MMOL/L (ref 96–112)
CO2 SERPL-SCNC: 25 MMOL/L (ref 20–33)
CREAT SERPL-MCNC: 0.97 MG/DL (ref 0.5–1.4)
EOSINOPHIL # BLD AUTO: 0 K/UL (ref 0–0.51)
EOSINOPHIL NFR BLD: 0 % (ref 0–6.9)
ERYTHROCYTE [DISTWIDTH] IN BLOOD BY AUTOMATED COUNT: 34.2 FL (ref 35.9–50)
GLOBULIN SER CALC-MCNC: 4.3 G/DL (ref 1.9–3.5)
GLUCOSE SERPL-MCNC: 143 MG/DL (ref 65–99)
HCT VFR BLD AUTO: 49.1 % (ref 42–52)
HGB BLD-MCNC: 16.6 G/DL (ref 14–18)
IMM GRANULOCYTES # BLD AUTO: 0.03 K/UL (ref 0–0.11)
IMM GRANULOCYTES NFR BLD AUTO: 0.6 % (ref 0–0.9)
LYMPHOCYTES # BLD AUTO: 0.62 K/UL (ref 1–4.8)
LYMPHOCYTES NFR BLD: 13.2 % (ref 22–41)
MCH RBC QN AUTO: 27.3 PG (ref 27–33)
MCHC RBC AUTO-ENTMCNC: 33.8 G/DL (ref 33.7–35.3)
MCV RBC AUTO: 80.9 FL (ref 81.4–97.8)
MONOCYTES # BLD AUTO: 0.04 K/UL (ref 0–0.85)
MONOCYTES NFR BLD AUTO: 0.8 % (ref 0–13.4)
NEUTROPHILS # BLD AUTO: 4.01 K/UL (ref 1.82–7.42)
NEUTROPHILS NFR BLD: 85.2 % (ref 44–72)
NRBC # BLD AUTO: 0 K/UL
NRBC BLD-RTO: 0 /100 WBC
PLATELET # BLD AUTO: 309 K/UL (ref 164–446)
PMV BLD AUTO: 8.2 FL (ref 9–12.9)
POTASSIUM SERPL-SCNC: 4.4 MMOL/L (ref 3.6–5.5)
PROT SERPL-MCNC: 8.8 G/DL (ref 6–8.2)
RBC # BLD AUTO: 6.07 M/UL (ref 4.7–6.1)
SODIUM SERPL-SCNC: 138 MMOL/L (ref 135–145)
WBC # BLD AUTO: 4.7 K/UL (ref 4.8–10.8)

## 2018-11-16 PROCEDURE — 700111 HCHG RX REV CODE 636 W/ 250 OVERRIDE (IP): Performed by: INTERNAL MEDICINE

## 2018-11-16 PROCEDURE — G8996 SWALLOW CURRENT STATUS: HCPCS | Mod: CI

## 2018-11-16 PROCEDURE — A9270 NON-COVERED ITEM OR SERVICE: HCPCS | Performed by: STUDENT IN AN ORGANIZED HEALTH CARE EDUCATION/TRAINING PROGRAM

## 2018-11-16 PROCEDURE — 700117 HCHG RX CONTRAST REV CODE 255: Performed by: NEUROLOGICAL SURGERY

## 2018-11-16 PROCEDURE — 71260 CT THORAX DX C+: CPT

## 2018-11-16 PROCEDURE — 36415 COLL VENOUS BLD VENIPUNCTURE: CPT

## 2018-11-16 PROCEDURE — 700111 HCHG RX REV CODE 636 W/ 250 OVERRIDE (IP): Performed by: STUDENT IN AN ORGANIZED HEALTH CARE EDUCATION/TRAINING PROGRAM

## 2018-11-16 PROCEDURE — G8997 SWALLOW GOAL STATUS: HCPCS | Mod: CH

## 2018-11-16 PROCEDURE — 85025 COMPLETE CBC W/AUTO DIFF WBC: CPT

## 2018-11-16 PROCEDURE — 700102 HCHG RX REV CODE 250 W/ 637 OVERRIDE(OP): Performed by: STUDENT IN AN ORGANIZED HEALTH CARE EDUCATION/TRAINING PROGRAM

## 2018-11-16 PROCEDURE — 99233 SBSQ HOSP IP/OBS HIGH 50: CPT | Mod: GC | Performed by: HOSPITALIST

## 2018-11-16 PROCEDURE — 92610 EVALUATE SWALLOWING FUNCTION: CPT

## 2018-11-16 PROCEDURE — 84165 PROTEIN E-PHORESIS SERUM: CPT

## 2018-11-16 PROCEDURE — 76870 US EXAM SCROTUM: CPT

## 2018-11-16 PROCEDURE — 770001 HCHG ROOM/CARE - MED/SURG/GYN PRIV*

## 2018-11-16 PROCEDURE — 84160 ASSAY OF PROTEIN ANY SOURCE: CPT

## 2018-11-16 PROCEDURE — 80053 COMPREHEN METABOLIC PANEL: CPT

## 2018-11-16 RX ORDER — DEXAMETHASONE SODIUM PHOSPHATE 4 MG/ML
10 INJECTION, SOLUTION INTRA-ARTICULAR; INTRALESIONAL; INTRAMUSCULAR; INTRAVENOUS; SOFT TISSUE EVERY 6 HOURS
Status: DISCONTINUED | OUTPATIENT
Start: 2018-11-16 | End: 2018-11-16

## 2018-11-16 RX ORDER — DEXAMETHASONE SODIUM PHOSPHATE 4 MG/ML
4 INJECTION, SOLUTION INTRA-ARTICULAR; INTRALESIONAL; INTRAMUSCULAR; INTRAVENOUS; SOFT TISSUE EVERY 6 HOURS
Status: DISCONTINUED | OUTPATIENT
Start: 2018-11-16 | End: 2018-11-19

## 2018-11-16 RX ADMIN — DEXAMETHASONE SODIUM PHOSPHATE 4 MG: 4 INJECTION, SOLUTION INTRA-ARTICULAR; INTRALESIONAL; INTRAMUSCULAR; INTRAVENOUS; SOFT TISSUE at 18:12

## 2018-11-16 RX ADMIN — DEXAMETHASONE SODIUM PHOSPHATE 4 MG: 4 INJECTION, SOLUTION INTRA-ARTICULAR; INTRALESIONAL; INTRAMUSCULAR; INTRAVENOUS; SOFT TISSUE at 12:18

## 2018-11-16 RX ADMIN — DEXAMETHASONE SODIUM PHOSPHATE 10 MG: 4 INJECTION, SOLUTION INTRA-ARTICULAR; INTRALESIONAL; INTRAMUSCULAR; INTRAVENOUS; SOFT TISSUE at 06:10

## 2018-11-16 RX ADMIN — ACETAMINOPHEN 650 MG: 325 TABLET, FILM COATED ORAL at 16:09

## 2018-11-16 RX ADMIN — IOHEXOL 100 ML: 350 INJECTION, SOLUTION INTRAVENOUS at 11:44

## 2018-11-16 RX ADMIN — DEXAMETHASONE SODIUM PHOSPHATE 4 MG: 4 INJECTION, SOLUTION INTRA-ARTICULAR; INTRALESIONAL; INTRAMUSCULAR; INTRAVENOUS; SOFT TISSUE at 00:41

## 2018-11-16 ASSESSMENT — PAIN SCALES - GENERAL
PAINLEVEL_OUTOF10: 3
PAINLEVEL_OUTOF10: 7
PAINLEVEL_OUTOF10: 0
PAINLEVEL_OUTOF10: 0

## 2018-11-16 NOTE — CARE PLAN
Problem: Safety  Goal: Will remain free from falls  Outcome: PROGRESSING AS EXPECTED  Hourly rounding in place  Side rails up and padded  Low bed and locked  Call light within pt reach    Problem: Venous Thromboembolism (VTW)/Deep Vein Thrombosis (DVT) Prevention:  Goal: Patient will participate in Venous Thrombosis (VTE)/Deep Vein Thrombosis (DVT)Prevention Measures  Outcome: PROGRESSING AS EXPECTED  SCD in place    Problem: Knowledge Deficit  Goal: Knowledge of disease process/condition, treatment plan, diagnostic tests, and medications will improve  Outcome: PROGRESSING AS EXPECTED

## 2018-11-16 NOTE — PROGRESS NOTES
Received pt to unit at 2030 via eduardo accompanied by family members. Pt is A/Ox4. Pt has (L) lower facial droop and (L) arm weakness. Ambulatory SBA. Stated tolerated headache 3/10. No skin issue noted. NPO due to facial droop ordered SLP eval in morning. Seizure precaution/ hourly rounding in place. Call light within pt reach.

## 2018-11-16 NOTE — CONSULTS
DATE OF SERVICE:  11/16/2018    EMERGENCY ROOM CONSULTATION    CHIEF COMPLAINT:  Left arm, left facial weakness.    HISTORY OF PRESENT ILLNESS:  This is a 23-year-old right-handed man who I had   seen in clinic just a few days ago with progressive left upper extremity   dysfunction.  It is not particularly painful.  He had a pronator drift and so   we ordered an MRI with outpatient followup.  He had increasing weakness in his   face yesterday and headache that was persistent.  They are bifrontal.  He had   increased pain when he would stand up.  The difficulty with dexterity led him   to the ER.  An ER CAT scan shows a nonhemorrhagic deep right frontal mass   along the periventricular region with significant vasogenic edema.  His MRI of   the brain shows a 2.8x2.1 cm multilobulated irregular mass ring enhancing   with perhaps some central necrosis.  He also has a left tentorial superiorly   based cerebellar area or posterior fossa mass without significant vasogenic   edema.  This definitely increased the chance of metastatic disease.    PAST MEDICAL HISTORY:  Negative.    PAST SURGICAL HISTORY:  Negative.    MEDICATIONS:  He has been taking meloxicam for the headaches.    PHYSICAL EXAMINATION:  GENERAL:  He is awake, he is alert, he is oriented x3.  NEUROLOGIC:  Pupils are symmetric.  Extraocular motions intact.  He has an   increasing left lower facial weakness than when I saw him last.  Sensation is   intact in the face, arms, and legs.  He still has the left pronator drift with    about 4+, triceps 4, biceps 4+, deltoid 4+.  Distal lower extremities   are full strength.  Right upper extremity has full strength.  No pathologic   reflexes.  He has downgoing toe.    ASSESSMENT AND PLAN:  The patient has a multilobulated right frontal symptomatic mass and   another mass in the left posterior fossa dural based, suggesting metastasis.  He is admitted to the hospitalist.  Again, I am going to start Decadron for the  swelling.  I have ordered testicular ultrasound and SPEP his   lymphoma and testicular cancer on the differential.  Primary brain tumor is   possible, but the age is quite young.  I ordered a CT chest,   abdomen and pelvis to rule out a central mass.  Recommend a complete skin   screening for melanoma.  We will figure this out and figure if we could find a   primary source and obtain tissue that way.  Best case scenario, this would be   lymphoma and although usually they are solid enhancing, not ring enhancing   because that is very radiosensitive.  The right frontal lesion may eventually   need to come out because of its shear size.    Thanks for allowing us to participate in his care.  We will follow along   closely.       ____________________________________     MD BECKIE Dhaliwal III / MARCIN    DD:  11/16/2018 07:45:38  DT:  11/16/2018 12:08:21    D#:  [id number]  Job#:  [id number]

## 2018-11-16 NOTE — PROGRESS NOTES
Neurosurgery Progress Note    Subjective:  Pt came in with progressing headache and left arm weakness  Brain MRI shows several mainly right frontal lesions with edema  Headache is better with steroids, still has weakness  Being set up for body CT    Pt denies any significant medical history, smoking, testicular lesions etc     Exam:  A&O x 3   Appropriate   Weak to left C5, C7, C8; rest is normal   No drift / EOM intact   CN 2-12 grossly intact     BP  Min: 123/77  Max: 142/88  Pulse  Av.8  Min: 78  Max: 99  Resp  Avg: 15.6  Min: 14  Max: 16  Temp  Av.6 °C (97.8 °F)  Min: 36.2 °C (97.2 °F)  Max: 37 °C (98.6 °F)  SpO2  Av.3 %  Min: 96 %  Max: 100 %    No Data Recorded    Recent Labs      11/15/18   1615  18   0329   WBC  6.5  4.7*   RBC  5.82  6.07   HEMOGLOBIN  16.0  16.6   HEMATOCRIT  47.5  49.1   MCV  81.6  80.9*   MCH  27.5  27.3   MCHC  33.7  33.8   RDW  35.0*  34.2*   PLATELETCT  255  309   MPV  8.3*  8.2*     Recent Labs      11/15/18   1615  18   0328   SODIUM  140  138   POTASSIUM  4.0  4.4   CHLORIDE  105  102   CO2  26  25   GLUCOSE  84  143*   BUN  26*  21   CREATININE  0.99  0.97   CALCIUM  9.9  10.6*     Recent Labs      11/15/18   161   APTT  37.8*   INR  1.10           Intake/Output     None          No intake or output data in the 24 hours ending 18 0839         • dexamethasone  10 mg Q6HRS   • senna-docusate  2 Tab BID    And   • polyethylene glycol/lytes  1 Packet QDAY PRN    And   • magnesium hydroxide  30 mL QDAY PRN    And   • bisacodyl  10 mg QDAY PRN   • labetalol  10 mg Q4HRS PRN   • acetaminophen  650 mg Q6HRS PRN   • famotidine  20 mg BID       Assessment and Plan:  Hospital day #2  Getting body CT today  If available lesion other than brain to biopsy that would be advisable  Brain biopsy if needed  Will follow closely   Continue steroids

## 2018-11-16 NOTE — PROGRESS NOTES
Assumed care of pt at 0700.   Pt is A&Ox4.   Pt denies n/v and n/t at this time.   Pt is standby assist to bathroom with steady gait.   Seizure precautions in place.   Family at bedside.   Plan of care discussed.   Hourly rounding in place.

## 2018-11-16 NOTE — ASSESSMENT & PLAN NOTE
- secondary to brain lesions  - no blurred vision  - symptomatically improved    Plan  - Decadron PO

## 2018-11-16 NOTE — ASSESSMENT & PLAN NOTE
- secondary to brain mass, some midline shift  - normal vital signs, reduced headache, no blurred vision    Plan  - Decadron PO

## 2018-11-16 NOTE — CARE PLAN
Problem: Safety  Goal: Will remain free from injury  Outcome: PROGRESSING AS EXPECTED  Treaded slipper socks in place. Bed locked and in lowest position. Seizure precautions in place. Call light within reach.     Problem: Pain Management  Goal: Pain level will decrease to patient's comfort goal  Outcome: PROGRESSING AS EXPECTED  PRN pain medication in use.

## 2018-11-16 NOTE — H&P
"      Internal Medicine Admitting History and Physical    Note Author: Daysi Lopez M.D.       Name Keanu Trivedi       1995   Age/Sex 23 y.o. male   MRN [id number]   Code Status Full     After 5PM or if no immediate response to page, please call for cross-coverage  Attending/Team: Dr Gray/David See Patient List for primary contact information  Call [phone] to page    1st Call - Day Intern (R1):   Dr Lazar 2nd Call - Day Sr. Resident (R2/R3):   Dr Bocanegra       Chief Complaint:   Headache    HPI:  Mr Trivedi is a very pleasant 23 year old male with no significant past medical history who presents to the ED for headaches that started off and on one year ago. He never had headaches prior to this. States one month ago they increased in frequency and became daily then 10 days ago they increased in intensity and duration, lasting most of the day. They are mostly bilateral frontal headaches and became a 7/10. Standing up significantly increases the pain. Also 10 days ago, patient started having left arm weakness that has progressively gotten worse, now finding it difficult to hold objects without concentrating on it with \"horrible\" dexterity. He had left sided tongue, facial and arm numbness 10 days ago that lasted only 45 minutes then resolved. Believes his vision is getting worse and feels slow. Started having night sweats recently and has right sided neck pain, no stiffness. Denies any loss of appetite, fevers, chills, lightheadedness, vertigo, photophobia, phonophobia, seizures, nausea, vomiting, bowel/bladder incontience, fatigue, lower extremity weakness, gait disturbances or weight changes. Patient does not use any drugs, traveled to [place] in May and the [facility] the previous May. No illnesses.     Patient went to his PCP one month ago for worsening headaches, was told likely seasonal allergies. Headaches continued to worsen so went to [facility] urgent care and was told this is likely a cervical spine " issue and had an xray of neck that was unremarkable, prescribed flexeril an mobic and was referred to spine surgery. He saw Dr. Manzano yesterday and was told this is not a neck issue, it is an brain issue and an MRI brain was ordered but patient had not even been scheduled when headache got worse and he went to the ED today.     In the ED: BP increasing up to 158/100, HR upper 90's, CT head w/out contrast showed 2.5x1.7.x1.6 cm right frontal lobe lesion with surrounding edema with a 9 mm midline shift to the left, afebrile and WBC 6.5, MRI brain w/contrast was done but not resulted and neurosurgery was consulted, patient received 10 mg IV decadron    Review of Systems   Constitutional: Positive for diaphoresis. Negative for chills, fever, malaise/fatigue and weight loss.   HENT: Negative for hearing loss, sinus pain, sore throat and tinnitus.    Eyes: Positive for blurred vision. Negative for double vision, photophobia and redness.   Respiratory: Negative for cough and shortness of breath.    Cardiovascular: Negative for chest pain, palpitations and orthopnea.   Gastrointestinal: Negative for abdominal pain, constipation, diarrhea, nausea and vomiting.   Genitourinary: Negative for dysuria and urgency.   Musculoskeletal: Positive for neck pain. Negative for back pain and myalgias.   Skin: Negative for itching and rash.   Neurological: Positive for focal weakness, weakness and headaches. Negative for dizziness, tingling, tremors, sensory change, speech change, seizures and loss of consciousness.   Psychiatric/Behavioral: Negative for depression and memory loss. The patient is not nervous/anxious.              Past Medical History (Chronic medical problem, known complications and current treatment)    History reviewed. No pertinent past medical history.      Past Surgical History:  Past Surgical History:   Procedure Laterality Date   • OTHER ORTHOPEDIC SURGERY      foot repair       Current Outpatient  "Medications:  Home Medications     Reviewed by Diamante Mckeon, Pharmacy Intern (Pharmacy Intern) on 11/15/18 at 1925  Med List Status: Complete   Medication Last Dose Status   cyclobenzaprine (FLEXERIL) 10 MG Tab 11/14/2018 Active   meloxicam (MOBIC) 15 MG tablet 11/14/2018 Active                Medication Allergy/Sensitivities:  No Known Allergies      Family History (mandatory)   Family History   Problem Relation Age of Onset   • Heart Disease Mother    • Rheumatologic Disease Mother         aortitis    Grandfather - lung cancer  Grandmother - TIA  Father and mother - RA    Social History (mandatory)   Social History     Social History   • Marital status: Single     Spouse name: N/A   • Number of children: N/A   • Years of education: N/A     Occupational History   • Not on file.     Social History Main Topics   • Smoking status: Never Smoker   • Smokeless tobacco: Never Used   • Alcohol use Yes      Comment: rare   • Drug use: No   • Sexual activity: Not on file     Other Topics Concern   • Not on file     Social History Narrative   • No narrative on file     Living situation: Lives in [place] with his mother.   PCP : YINKA RodPREAL.    Physical Exam     Vitals:    11/15/18 1701 11/15/18 1710 11/15/18 1730 11/15/18 1830   BP:       Pulse: 95 99 95 98   Resp:       Temp:       TempSrc:       SpO2: 99% 100% 97% 100%   Weight:       Height:         Body mass index is 23.68 kg/m².  /90   Pulse 98   Temp 37 °C (98.6 °F) (Temporal)   Resp 16   Ht 1.803 m (5' 11\")   Wt 77 kg (169 lb 12.1 oz)   SpO2 100%   BMI 23.68 kg/m²   O2 therapy: Pulse Oximetry: 100 %    Physical Exam   Constitutional: He is oriented to person, place, and time and well-developed, well-nourished, and in no distress.   HENT:   Head: Normocephalic and atraumatic.   Mouth/Throat: Oropharynx is clear and moist. No oropharyngeal exudate.   Eyes: Pupils are equal, round, and reactive to light. Conjunctivae and EOM are normal. Right " eye exhibits no discharge. No scleral icterus.   Neck: Normal range of motion. Neck supple.   Cardiovascular: Normal rate, regular rhythm and intact distal pulses.    No murmur heard.  Pulmonary/Chest: Effort normal and breath sounds normal. No respiratory distress. He has no wheezes. He has no rales.   Abdominal: Soft. Bowel sounds are normal. He exhibits no distension. There is no tenderness.   Musculoskeletal: Normal range of motion.   strength 4/5 left upper extremity, 5/5 bilateral lower extremities, 5/5 right upper extremity   Lymphadenopathy:     He has no cervical adenopathy.   Neurological: He is alert and oriented to person, place, and time. GCS score is 15.   CN 2-12 intact with no deficits found, no cerebellar abnormalities identified, pinprick sensation diminished left arm, slow movement left arm, reflexes bilateral patellar 3+, babinski indeterminate     Skin: Skin is dry. He is not diaphoretic.   Cool hands and feet bilaterally    Psychiatric: Mood, memory, affect and judgment normal.   Nursing note and vitals reviewed.        Data Review       Old Records Request:   Deferred  Current Records review/summary: Completed    Lab Data Review:  Recent Results (from the past 24 hour(s))   CBC WITH DIFFERENTIAL    Collection Time: 11/15/18  4:15 PM   Result Value Ref Range    WBC 6.5 4.8 - 10.8 K/uL    RBC 5.82 4.70 - 6.10 M/uL    Hemoglobin 16.0 14.0 - 18.0 g/dL    Hematocrit 47.5 42.0 - 52.0 %    MCV 81.6 81.4 - 97.8 fL    MCH 27.5 27.0 - 33.0 pg    MCHC 33.7 33.7 - 35.3 g/dL    RDW 35.0 (L) 35.9 - 50.0 fL    Platelet Count 255 164 - 446 K/uL    MPV 8.3 (L) 9.0 - 12.9 fL    Neutrophils-Polys 66.10 44.00 - 72.00 %    Lymphocytes 21.60 (L) 22.00 - 41.00 %    Monocytes 7.80 0.00 - 13.40 %    Eosinophils 3.70 0.00 - 6.90 %    Basophils 0.50 0.00 - 1.80 %    Immature Granulocytes 0.30 0.00 - 0.90 %    Nucleated RBC 0.00 /100 WBC    Neutrophils (Absolute) 4.31 1.82 - 7.42 K/uL    Lymphs (Absolute) 1.41 1.00 -  4.80 K/uL    Monos (Absolute) 0.51 0.00 - 0.85 K/uL    Eos (Absolute) 0.24 0.00 - 0.51 K/uL    Baso (Absolute) 0.03 0.00 - 0.12 K/uL    Immature Granulocytes (abs) 0.02 0.00 - 0.11 K/uL    NRBC (Absolute) 0.00 K/uL   BASIC METABOLIC PANEL    Collection Time: 11/15/18  4:15 PM   Result Value Ref Range    Sodium 140 135 - 145 mmol/L    Potassium 4.0 3.6 - 5.5 mmol/L    Chloride 105 96 - 112 mmol/L    Co2 26 20 - 33 mmol/L    Glucose 84 65 - 99 mg/dL    Bun 26 (H) 8 - 22 mg/dL    Creatinine 0.99 0.50 - 1.40 mg/dL    Calcium 9.9 8.5 - 10.5 mg/dL    Anion Gap 9.0 0.0 - 11.9   ESTIMATED GFR    Collection Time: 11/15/18  4:15 PM   Result Value Ref Range    GFR If African American >60 >60 mL/min/1.73 m 2    GFR If Non African American >60 >60 mL/min/1.73 m 2   APTT    Collection Time: 11/15/18  4:15 PM   Result Value Ref Range    APTT 37.8 (H) 24.7 - 36.0 sec   PROTHROMBIN TIME    Collection Time: 11/15/18  4:15 PM   Result Value Ref Range    PT 14.3 12.0 - 14.6 sec    INR 1.10 0.87 - 1.13       Imaging/Procedures Review:    Independant Imaging Review: Completed  CT-HEAD W/O   Final Result      2.5 x 1.6 x 1.7 cm heterogeneous lesion in the right frontal lobe with significant surrounding edema, mass effect and 9 mm midline shift to the left.   Differential considerations include an abscess, primary or secondary malignancy.      Findings discussed with Dr. Almazan.      MR-BRAIN-WITH & W/O    (Results Pending)        EKG:   None done    Records reviewed and summarized in current documentation :  Yes  [employer] teaching service handout given to patient:  No         Assessment/Plan     * Lesion of right frontal lobe of brain- (present on admission)   Assessment & Plan    Concern for primary brain malignancy over metastasis at this time and does not appear to be infectious  Neurosurgery already consulted and will be seeing patient in the morning  MRI brain with contrast already done awaiting read    Plan:   - admit to neurosurgery  -  decadron IV 4 mg q 6 hours  - q 4 hour neuro checks  - NPO at midnight in case NS would like to operate tomorrow  - SCDs for DVT prophylaxis   - seizure/aspiration/fall precautions     Generalized headaches- (present on admission)   Assessment & Plan    Due to brain lesion, see plan for brain lesion  Decadron has helped with headache         Anticipated Hospital stay:  >2 midnights        Quality Measures  Quality-Core Measures  PCP: YINKA RodPAbdulkadirN.

## 2018-11-16 NOTE — THERAPY
"Speech Language Therapy Clinical Swallow Evaluation completed.    Functional Status: Patient AAOx4 with parents and brother at bedside. Speech and vocal quality WFL. Oral motor examination revealed minimal facial asymmetry. Otherwise, WFL. PO trials were given and consisted of thin liquids, soft solids in mixed consistency form, and dry solids (saltine and juan carlos crackers). Swallow trigger was timely and laryngeal elevation complete to palpation. Patient presented with subtle throat clear x1 with thins otherwise appeared to tolerate without difficulty. Vocal quality remained clear all session. No other s/sx of aspiration appreciated with all other textures.     Recommendations - Diet: At this time, patient presents with functional swallow for regular/thin liquid diet given intermittent supervision. Please page SLP with any changes in status. Will most likely need repeat swallow evaluation following possible sx.                             Strategies: Monitor during meals and Head of Bed at 90 Degrees                            Medication Administration: Whole with Liquid Wash     Plan of Care: Will benefit from Speech Therapy 3 times per week    Post-Acute Therapy: To be determined     See \"Rehab Therapy-Acute\" Patient Summary Report for complete documentation. Thank you for the consult.       "

## 2018-11-16 NOTE — SENIOR ADMIT NOTE
Senior Admit Note    Patient: Keanu Trivedi.  MRN: [id number].                               Chief complaint: headache, weakness    HPI:  In brief, Mr Farooq is a 23 y M w/ no PMHx, presenting for headache and weakness. Patient reports headache for months which has gotten slowly progressively worse. He notes intermittent severe pain when going from sitting to standing position. He also states his left arm is much slower than his right arm, with increased weakness for past two days. He saw neurosurgery Dr Manzano yesterday who directed patient to ED for further evaluation for concern for brain etiology of patient's symptoms. In the ED, CT head shows 2.5x1.6x1.7cm heterogeneous mass in right frontal lobe with significant surrounding edema, mass effect, and 9mm midline shift to the left. Patient given decadron x1 dose in ED with some relief of headache symptoms. He will be admitted for further evaluation.    Objective:  Pertinent vitals/physical findings: T 98.6, HR 98, RR 16, /90, 97% O2 RA  Gen.: lying in bed, NAD.  HEENT: Normocephalic, nontraumatic.  MMM.  No icterus. Left facial droop. Neck normal ROM, no Brudzinski's or Kernig's sign  CV: RRR, No m/r/g.  No JVD.  No carotid bruits.  Lungs: CTAB.  No crackles, wheezing.    Abd: soft, NT/ND, no rebound, bowel sounds present.   Extremities: No lower extremity edema. Distal pulses intact.  Neuro: Alert and oriented to person, place, time, situation. CN's intact, L UE strength 4/5, sensation to touch intact throughout, DTRs 2+, Babinski equivocal, cerebellar fxn intact  Skin: warm, dry.   Psych: appropriate mood, affect.      Labs:  CBC and BMP unremarkable.     Imaging:  CT head findings as above.  MRI brain ordered.    Assessment/plan for main hospital problem:    # Brain mass  # Left facial droop and L arm weakness    Admit to neurosurgery. ERP discussed case with neurosurgery Dr Manzano.  NS to see in AM.  MRI brain pending. CT head shows singular lesion with  surrounding edema. Possible primary cancer lesion, do not suspect cancer metastasis at this time so will hold off for now on pan CT's.  No fever, signs of infection, or other spinal involvement. No indication for LP at this time.  Keep NPO at midnight, start decadron 4mg q6h.  Fall, seizure, aspiration precautions. Neuro checks q4h.      DVT prophylaxis: SCDs  Code status: FULL    For complete details, please refer to H&P by intern, Dr. Lopez.     Teddy Ferreira M.D.

## 2018-11-16 NOTE — PROGRESS NOTES
Internal Medicine Interval Note  Note Author: Ashley Bocanegra M.D.     Name Keanu Trivedi 1995   Age/Sex 23 y.o. male   MRN [id number]   Code Status full     After 5PM or if no immediate response to page, please call for cross-coverage  Attending/Team: yellow  See Patient List for primary contact information  Call [phone] to page    1st Call - Day Intern (R1):   Nagi  2nd Call - Day Sr. Resident (R2/R3):   Sahra          Reason for interval visit  (Principal Problem)   Brain mass     Interval Problem Daily Status Update  (24 hours, problem oriented, brief subjective history, new lab/imaging data pertinent to that problem)   No acute overnight event. Pt reports improvement in HAs with steroid.   Family at bedside, anxious.   Denies any Hx of testsicular, brain malignancy, however, hs + Fhx of lung cancer in mothers side. Other siblings and parents healthy.  Pt denies any personally Hx of weight loss, fatigue, recurrent infection, lumps bumps on the body, including testicles, any suspicious skin lesion.         ROS  Constitutional: negative for fever/ chills,  Malaise, weight loss and diaphoresis.   HENT: Negative for nosebleeds and sore throat.    Eyes: Negative for blurred vision and redness, vision loss  Respiratory: Negative for cough, hemoptysis and shortness of breath.    Cardiovascular: Negative for chest pain, palpitations, orthopnea, HSU and leg swelling   Gastrointestinal: Negative for heartburn, nausea, vomiting and abdominal pain.   Genitourinary: Negative for dysuria, frequency and hematuria.   Musculoskeletal: negative for joint swelling, pain. Negative for myalgias.  Skin: Negative for itching and rash.   Neurological: Negative for dizziness, tingling, sensory change, seizures Endo/Heme/Allergies: Does not bruise/bleed easily.   Psychiatric/Behavioral: Negative for depression and substance abuse.     Disposition/Barriers to discharge:   Inpatient. Active medical w/u and  management     Consultants/Specialty  NSGY- Dr Jose Juan PIÑA    PCP: KIAN Rod      Quality Measures  Quality-Core Measures   Reviewed items::  Labs reviewed, Medications reviewed and Radiology images reviewed  Suarez catheter::  No Suarez  DVT prophylaxis pharmacological::  Not indicated at this time, ambulatory  DVT prophylaxis - mechanical:  SCDs  Ulcer Prophylaxis::  No          Physical Exam       Vitals:    11/16/18 0000 11/16/18 0400 11/16/18 0800 11/16/18 1200   BP: 140/86 123/77 125/83 130/84   Pulse: 92 78 62 94   Resp: 16 16 15 15   Temp: 36.5 °C (97.7 °F) 36.2 °C (97.2 °F) 36.6 °C (97.9 °F) 37.1 °C (98.8 °F)   TempSrc: Temporal Temporal Temporal Temporal   SpO2: 96% 97% 96% 96%   Weight:       Height:         Body mass index is 23.68 kg/m².    Oxygen Therapy:  Pulse Oximetry: 96 %, O2 (LPM): 0, O2 Delivery: None (Room Air)    Physical Exam  General; reclining on the bed, looks comfortable,actively conversing.  HEENT: AT/NC, PERRLA, EOMI, neck supple, MMM  CHest: CTAB  CVS: RRR, S1S2 no m/r/g, no JVP,or pedal edema  Abdomen: soft non tender  Neuro: a&O x 3, mild facial droop on the L side  Genitourinary: Testicle appears normal, non tender, homogenous on palpation, no hard lump or fluid appreciated. No inguinal LAD. Penis appear normal.  LN: no pre auricular, cervical, axillary, mediastinal LAD appreciated.   Extremity: no e/c/c  Psych: mood and affect appropriate           Assessment/Plan     * Lesion of right frontal lobe of brain- (present on admission)   Assessment & Plan    A/w headache with neurological deficits. MR brain: 2.5 cm lesion a/w brain edema and 9 mm midline shift, another smaller brain lesion beside this one. Appears metastatic, high suspicion for germ cell tumor. Doesn't appear to be of infectious etiology   NSGY on board, pan CT for the w/u of primary.    Plan:   - Mendoza CT, US scrotum with labs for germ cell tumor  - decadron IV 4 mg q 6 hours  - q 4 hour neuro checks  - SCDs  for DVT prophylaxis   - seizure/aspiration/fall precautions     Generalized headaches- (present on admission)   Assessment & Plan    Due to brain lesion, see plan for brain lesion  Improved with Decadron     Cerebral edema (HCC)   Assessment & Plan    From the brain mass. Continue decadron 4mg IV q6     Midline shift of brain   Assessment & Plan    Continue decadron 4mg IV q6, till edema subsides

## 2018-11-16 NOTE — ASSESSMENT & PLAN NOTE
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

## 2018-11-16 NOTE — PROGRESS NOTES
Multiple brain and lung masses is making testicular cancer higher on the differential. I have called ultrasound to expedite the testicular study. Agree with IR biopsy of lung mass for ease of access to get tissue diagnosis

## 2018-11-17 ENCOUNTER — APPOINTMENT (OUTPATIENT)
Dept: RADIOLOGY | Facility: MEDICAL CENTER | Age: 23
DRG: 180 | End: 2018-11-17
Attending: INTERNAL MEDICINE
Payer: COMMERCIAL

## 2018-11-17 ENCOUNTER — APPOINTMENT (OUTPATIENT)
Dept: RADIOLOGY | Facility: MEDICAL CENTER | Age: 23
DRG: 180 | End: 2018-11-17
Attending: RADIOLOGY
Payer: COMMERCIAL

## 2018-11-17 LAB
ALBUMIN SERPL BCP-MCNC: 4.3 G/DL (ref 3.2–4.9)
ALBUMIN/GLOB SERPL: 1 G/DL
ALP SERPL-CCNC: 86 U/L (ref 30–99)
ALT SERPL-CCNC: 43 U/L (ref 2–50)
ANION GAP SERPL CALC-SCNC: 10 MMOL/L (ref 0–11.9)
APPEARANCE UR: CLEAR
AST SERPL-CCNC: 20 U/L (ref 12–45)
B-HCG SERPL-ACNC: <0.6 MIU/ML (ref 0–5)
BASOPHILS # BLD AUTO: 0.1 % (ref 0–1.8)
BASOPHILS # BLD: 0.01 K/UL (ref 0–0.12)
BILIRUB SERPL-MCNC: 0.6 MG/DL (ref 0.1–1.5)
BILIRUB UR QL STRIP.AUTO: NEGATIVE
BUN SERPL-MCNC: 30 MG/DL (ref 8–22)
CALCIUM SERPL-MCNC: 10.6 MG/DL (ref 8.5–10.5)
CHLORIDE SERPL-SCNC: 102 MMOL/L (ref 96–112)
CO2 SERPL-SCNC: 25 MMOL/L (ref 20–33)
COLOR UR: YELLOW
CREAT SERPL-MCNC: 1.11 MG/DL (ref 0.5–1.4)
EOSINOPHIL # BLD AUTO: 0 K/UL (ref 0–0.51)
EOSINOPHIL NFR BLD: 0 % (ref 0–6.9)
ERYTHROCYTE [DISTWIDTH] IN BLOOD BY AUTOMATED COUNT: 35.8 FL (ref 35.9–50)
GLOBULIN SER CALC-MCNC: 4.2 G/DL (ref 1.9–3.5)
GLUCOSE SERPL-MCNC: 139 MG/DL (ref 65–99)
GLUCOSE UR STRIP.AUTO-MCNC: NEGATIVE MG/DL
HCT VFR BLD AUTO: 48.3 % (ref 42–52)
HGB BLD-MCNC: 15.4 G/DL (ref 14–18)
IMM GRANULOCYTES # BLD AUTO: 0.06 K/UL (ref 0–0.11)
IMM GRANULOCYTES NFR BLD AUTO: 0.6 % (ref 0–0.9)
KETONES UR STRIP.AUTO-MCNC: NEGATIVE MG/DL
LDH SERPL L TO P-CCNC: 139 U/L (ref 107–266)
LEUKOCYTE ESTERASE UR QL STRIP.AUTO: NEGATIVE
LYMPHOCYTES # BLD AUTO: 0.95 K/UL (ref 1–4.8)
LYMPHOCYTES NFR BLD: 9.1 % (ref 22–41)
MCH RBC QN AUTO: 26.5 PG (ref 27–33)
MCHC RBC AUTO-ENTMCNC: 31.9 G/DL (ref 33.7–35.3)
MCV RBC AUTO: 83 FL (ref 81.4–97.8)
MICRO URNS: NORMAL
MONOCYTES # BLD AUTO: 0.41 K/UL (ref 0–0.85)
MONOCYTES NFR BLD AUTO: 3.9 % (ref 0–13.4)
NEUTROPHILS # BLD AUTO: 8.98 K/UL (ref 1.82–7.42)
NEUTROPHILS NFR BLD: 86.3 % (ref 44–72)
NITRITE UR QL STRIP.AUTO: NEGATIVE
NRBC # BLD AUTO: 0 K/UL
NRBC BLD-RTO: 0 /100 WBC
PH UR STRIP.AUTO: 6 [PH]
PLATELET # BLD AUTO: 331 K/UL (ref 164–446)
PMV BLD AUTO: 8.3 FL (ref 9–12.9)
POTASSIUM SERPL-SCNC: 4.8 MMOL/L (ref 3.6–5.5)
PROT SERPL-MCNC: 8.5 G/DL (ref 6–8.2)
PROT UR QL STRIP: NEGATIVE MG/DL
RBC # BLD AUTO: 5.82 M/UL (ref 4.7–6.1)
RBC UR QL AUTO: NEGATIVE
SODIUM SERPL-SCNC: 137 MMOL/L (ref 135–145)
SP GR UR STRIP.AUTO: 1.03
UROBILINOGEN UR STRIP.AUTO-MCNC: 0.2 MG/DL
WBC # BLD AUTO: 10.4 K/UL (ref 4.8–10.8)

## 2018-11-17 PROCEDURE — C2613 LUNG BX PLUG W/DEL SYS: HCPCS

## 2018-11-17 PROCEDURE — 71045 X-RAY EXAM CHEST 1 VIEW: CPT

## 2018-11-17 PROCEDURE — 700111 HCHG RX REV CODE 636 W/ 250 OVERRIDE (IP)

## 2018-11-17 PROCEDURE — 88313 SPECIAL STAINS GROUP 2: CPT

## 2018-11-17 PROCEDURE — 83615 LACTATE (LD) (LDH) ENZYME: CPT

## 2018-11-17 PROCEDURE — 88342 IMHCHEM/IMCYTCHM 1ST ANTB: CPT

## 2018-11-17 PROCEDURE — 36415 COLL VENOUS BLD VENIPUNCTURE: CPT

## 2018-11-17 PROCEDURE — 84702 CHORIONIC GONADOTROPIN TEST: CPT

## 2018-11-17 PROCEDURE — 99153 MOD SED SAME PHYS/QHP EA: CPT

## 2018-11-17 PROCEDURE — 82105 ALPHA-FETOPROTEIN SERUM: CPT

## 2018-11-17 PROCEDURE — 88305 TISSUE EXAM BY PATHOLOGIST: CPT

## 2018-11-17 PROCEDURE — 770001 HCHG ROOM/CARE - MED/SURG/GYN PRIV*

## 2018-11-17 PROCEDURE — 700111 HCHG RX REV CODE 636 W/ 250 OVERRIDE (IP): Performed by: RADIOLOGY

## 2018-11-17 PROCEDURE — 80053 COMPREHEN METABOLIC PANEL: CPT

## 2018-11-17 PROCEDURE — 700111 HCHG RX REV CODE 636 W/ 250 OVERRIDE (IP): Performed by: STUDENT IN AN ORGANIZED HEALTH CARE EDUCATION/TRAINING PROGRAM

## 2018-11-17 PROCEDURE — 81003 URINALYSIS AUTO W/O SCOPE: CPT

## 2018-11-17 PROCEDURE — 700105 HCHG RX REV CODE 258: Performed by: STUDENT IN AN ORGANIZED HEALTH CARE EDUCATION/TRAINING PROGRAM

## 2018-11-17 PROCEDURE — 99233 SBSQ HOSP IP/OBS HIGH 50: CPT | Mod: GC | Performed by: HOSPITALIST

## 2018-11-17 PROCEDURE — 85025 COMPLETE CBC W/AUTO DIFF WBC: CPT

## 2018-11-17 PROCEDURE — 0BBJ3ZX EXCISION OF LEFT LOWER LUNG LOBE, PERCUTANEOUS APPROACH, DIAGNOSTIC: ICD-10-PCS | Performed by: RADIOLOGY

## 2018-11-17 PROCEDURE — 700111 HCHG RX REV CODE 636 W/ 250 OVERRIDE (IP): Performed by: INTERNAL MEDICINE

## 2018-11-17 PROCEDURE — 88341 IMHCHEM/IMCYTCHM EA ADD ANTB: CPT | Mod: 91

## 2018-11-17 RX ORDER — SODIUM CHLORIDE 9 MG/ML
500 INJECTION, SOLUTION INTRAVENOUS
Status: CANCELLED | OUTPATIENT
Start: 2018-11-17 | End: 2018-11-17

## 2018-11-17 RX ORDER — MIDAZOLAM HYDROCHLORIDE 1 MG/ML
INJECTION INTRAMUSCULAR; INTRAVENOUS
Status: COMPLETED
Start: 2018-11-17 | End: 2018-11-17

## 2018-11-17 RX ORDER — SODIUM CHLORIDE 9 MG/ML
500 INJECTION, SOLUTION INTRAVENOUS
Status: ACTIVE | OUTPATIENT
Start: 2018-11-17 | End: 2018-11-17

## 2018-11-17 RX ORDER — NALOXONE HYDROCHLORIDE 0.4 MG/ML
INJECTION, SOLUTION INTRAMUSCULAR; INTRAVENOUS; SUBCUTANEOUS
Status: DISPENSED
Start: 2018-11-17 | End: 2018-11-17

## 2018-11-17 RX ORDER — LIDOCAINE HYDROCHLORIDE 10 MG/ML
INJECTION, SOLUTION EPIDURAL; INFILTRATION; INTRACAUDAL; PERINEURAL
Status: COMPLETED
Start: 2018-11-17 | End: 2018-11-17

## 2018-11-17 RX ORDER — MIDAZOLAM HYDROCHLORIDE 1 MG/ML
.5-2 INJECTION INTRAMUSCULAR; INTRAVENOUS PRN
Status: ACTIVE | OUTPATIENT
Start: 2018-11-17 | End: 2018-11-17

## 2018-11-17 RX ORDER — MIDAZOLAM HYDROCHLORIDE 1 MG/ML
.5-2 INJECTION INTRAMUSCULAR; INTRAVENOUS PRN
Status: CANCELLED | OUTPATIENT
Start: 2018-11-17 | End: 2018-11-17

## 2018-11-17 RX ORDER — ONDANSETRON 2 MG/ML
4 INJECTION INTRAMUSCULAR; INTRAVENOUS PRN
Status: ACTIVE | OUTPATIENT
Start: 2018-11-17 | End: 2018-11-17

## 2018-11-17 RX ORDER — NALOXONE HYDROCHLORIDE 0.4 MG/ML
INJECTION, SOLUTION INTRAMUSCULAR; INTRAVENOUS; SUBCUTANEOUS
Status: COMPLETED
Start: 2018-11-17 | End: 2018-11-17

## 2018-11-17 RX ORDER — ONDANSETRON 2 MG/ML
4 INJECTION INTRAMUSCULAR; INTRAVENOUS PRN
Status: CANCELLED | OUTPATIENT
Start: 2018-11-17 | End: 2018-11-17

## 2018-11-17 RX ADMIN — DEXAMETHASONE SODIUM PHOSPHATE 4 MG: 4 INJECTION, SOLUTION INTRA-ARTICULAR; INTRALESIONAL; INTRAMUSCULAR; INTRAVENOUS; SOFT TISSUE at 05:56

## 2018-11-17 RX ADMIN — LIDOCAINE HYDROCHLORIDE: 10 INJECTION, SOLUTION EPIDURAL; INFILTRATION; INTRACAUDAL; PERINEURAL at 09:41

## 2018-11-17 RX ADMIN — DEXAMETHASONE SODIUM PHOSPHATE 4 MG: 4 INJECTION, SOLUTION INTRA-ARTICULAR; INTRALESIONAL; INTRAMUSCULAR; INTRAVENOUS; SOFT TISSUE at 11:57

## 2018-11-17 RX ADMIN — MIDAZOLAM 1 MG: 1 INJECTION INTRAMUSCULAR; INTRAVENOUS at 09:19

## 2018-11-17 RX ADMIN — FENTANYL CITRATE 50 MCG: 50 INJECTION, SOLUTION INTRAMUSCULAR; INTRAVENOUS at 09:19

## 2018-11-17 RX ADMIN — FENTANYL CITRATE 50 MCG: 50 INJECTION, SOLUTION INTRAMUSCULAR; INTRAVENOUS at 09:40

## 2018-11-17 RX ADMIN — MIDAZOLAM 1 MG: 1 INJECTION INTRAMUSCULAR; INTRAVENOUS at 09:40

## 2018-11-17 RX ADMIN — SODIUM CHLORIDE 500 MG: 9 INJECTION, SOLUTION INTRAVENOUS at 16:56

## 2018-11-17 RX ADMIN — DEXAMETHASONE SODIUM PHOSPHATE 4 MG: 4 INJECTION, SOLUTION INTRA-ARTICULAR; INTRALESIONAL; INTRAMUSCULAR; INTRAVENOUS; SOFT TISSUE at 16:53

## 2018-11-17 RX ADMIN — DEXAMETHASONE SODIUM PHOSPHATE 4 MG: 4 INJECTION, SOLUTION INTRA-ARTICULAR; INTRALESIONAL; INTRAMUSCULAR; INTRAVENOUS; SOFT TISSUE at 00:00

## 2018-11-17 RX ADMIN — MIDAZOLAM 1 MG: 1 INJECTION INTRAMUSCULAR; INTRAVENOUS at 09:48

## 2018-11-17 ASSESSMENT — ENCOUNTER SYMPTOMS
NECK PAIN: 0
SENSORY CHANGE: 0
LOSS OF CONSCIOUSNESS: 0
SHORTNESS OF BREATH: 0
COUGH: 0
BLURRED VISION: 0
FOCAL WEAKNESS: 1
MYALGIAS: 0
TREMORS: 0
WEIGHT LOSS: 0
BLOOD IN STOOL: 0
BACK PAIN: 0
VOMITING: 0
PHOTOPHOBIA: 0
PALPITATIONS: 0
DIZZINESS: 0
SPEECH CHANGE: 0
CONSTIPATION: 0
DIARRHEA: 0
INSOMNIA: 0
ABDOMINAL PAIN: 0
WEAKNESS: 0
BRUISES/BLEEDS EASILY: 0
CHILLS: 0
FALLS: 0
DOUBLE VISION: 0
TINGLING: 0
NERVOUS/ANXIOUS: 0
SPUTUM PRODUCTION: 0
DEPRESSION: 0
SEIZURES: 0
HEADACHES: 1
SINUS PAIN: 0
FEVER: 0
NAUSEA: 0

## 2018-11-17 ASSESSMENT — PAIN SCALES - GENERAL
PAINLEVEL_OUTOF10: 0
PAINLEVEL_OUTOF10: 0

## 2018-11-17 NOTE — PROGRESS NOTES
Assumed care of pt at 0700, received report from night shift RN. Pt down to IR for lung biopsy at 0800, denies any pain at this time.

## 2018-11-17 NOTE — PROGRESS NOTES
Assume pt care at 1900. Family members at bedside. Pt A/Ox4, no c/o pain at the moment. Ambulatory steady gait SBA. Calls appropriately. Will place pt NPO after midnight for upcoming procedure.

## 2018-11-17 NOTE — PROGRESS NOTES
IR Procedure Note:    Site Marked and Confirmed with MD, patient and RN pre procedure.     Dr. Herbert performed a CT guided left lung mass biopsy with 2 cores in formalin, 1 core in RPMI.  The pt tolerated the procedure well; ETCo2 baseline 36, with consistent waveform during the procedure.      Tegaderm and gauze applied to left upper back, CDI and soft.  Pt alert, oriented and verbally appropriate post procedure, vital signs stable during procedure and transport, see flow sheet for vital signs.  Report given to TALA Gates.  RN transported pt to S180 with Sao2 monitor 98% on room air.      Procedure End Time: 0953

## 2018-11-17 NOTE — CARE PLAN
Problem: Safety  Goal: Will remain free from injury  Outcome: PROGRESSING AS EXPECTED  Padded side rails in place    Goal: Will remain free from falls  Outcome: PROGRESSING AS EXPECTED  Hourly rounding in place  2 side rails up  Bed low and locked    Problem: Pain Management  Goal: Pain level will decrease to patient's comfort goal  Outcome: PROGRESSING AS EXPECTED

## 2018-11-17 NOTE — PROGRESS NOTES
Neurosurgery Progress Note    Subjective:  Hospital day #3  Brain and Lung masses   Persistent left arm weakness  Brain MRI shows several mainly right frontal lesions with edema  Headache is better with steroids, still has weakness  CT Chest with Larger Left lung and smaller Right lung lesions   Testicular U/s Negative for lesions  Will see if Pulmonology/Interventional Radiology can get Primary DX from lung biopsy.  Pt denies any significant medical history, smoking, testicular lesions etc     Exam:  A&O x 3   Appropriate   Weak to left C5, C7, C8; rest is normal   No drift / EOM intact   CN 2-12 grossly intact     BP  Min: 126/87  Max: 156/91  Pulse  Av.8  Min: 69  Max: 100  Resp  Av.4  Min: 15  Max: 20  Temp  Av.1 °C (98.7 °F)  Min: 36.6 °C (97.8 °F)  Max: 37.4 °C (99.3 °F)  SpO2  Av.6 %  Min: 95 %  Max: 99 %    No Data Recorded    Recent Labs      11/15/18   1615  18   0329  18   0353   WBC  6.5  4.7*  10.4   RBC  5.82  6.07  5.82   HEMOGLOBIN  16.0  16.6  15.4   HEMATOCRIT  47.5  49.1  48.3   MCV  81.6  80.9*  83.0   MCH  27.5  27.3  26.5*   MCHC  33.7  33.8  31.9*   RDW  35.0*  34.2*  35.8*   PLATELETCT  255  309  331   MPV  8.3*  8.2*  8.3*     Recent Labs      11/15/18   1615  18   0328  18   0353   SODIUM  140  138  137   POTASSIUM  4.0  4.4  4.8   CHLORIDE  105  102  102   CO2  26  25  25   GLUCOSE  84  143*  139*   BUN  26*  21  30*   CREATININE  0.99  0.97  1.11   CALCIUM  9.9  10.6*  10.6*     Recent Labs      11/15/18   1615   APTT  37.8*   INR  1.10           Intake/Output       18 0700 - 18 0659 18 07 - 18 0659      1900-0659 Total [id number] 2444-0605 Total       Intake    P.O.  500  -- 500  --  -- --    P.O. 500 -- 500 -- -- --    Total Intake 500 -- 500 -- -- --       Output    Urine  --  -- --  --  -- --    Number of Times Voided 3 x -- 3 x -- -- --    Total Output -- -- -- -- -- --       Net I/O     500 -- 500 -- --  --            Intake/Output Summary (Last 24 hours) at 11/17/18 0859  Last data filed at 11/16/18 1800   Gross per 24 hour   Intake              500 ml   Output                0 ml   Net              500 ml            • lidocaine PF      • midazolam      • fentaNYL      • naloxone      • lidocaine PF      • NS  500 mL Once PRN   • fentaNYL  12.5-50 mcg PRN   • midazolam  0.5-2 mg PRN   • ondansetron  4 mg PRN   • dexamethasone  4 mg Q6HRS   • senna-docusate  2 Tab BID    And   • polyethylene glycol/lytes  1 Packet QDAY PRN    And   • magnesium hydroxide  30 mL QDAY PRN    And   • bisacodyl  10 mg QDAY PRN   • labetalol  10 mg Q4HRS PRN   • acetaminophen  650 mg Q6HRS PRN       Assessment and Plan:  Hospital day #3  CT Chest with Large left Lung and smaller right lung mass  Testicular U/S negative  Will let Pulmonology and/or IR work on getting lung lesion biopsies   Brain biopsy if needed   Will follow closely   Continue steroids

## 2018-11-17 NOTE — PROGRESS NOTES
Internal Medicine Interval Note  Note Author: Marcus Lazar M.D.     Name Keanu Trivedi 1995   Age/Sex 23 y.o. male   MRN [id number]   Code Status Full     After 5PM or if no immediate response to page, please call for cross-coverage  Attending/Team: Dr Gray/ David See Patient List for primary contact information  Call [phone] to page    1st Call - Day Intern (R1):   Dr Lazar  2nd Call - Day Sr. Resident (R2/R3):   Dr Bocanegra          Reason for interval visit  (Principal Problem)   Brain masses   Lung masses    Interval Problem Daily Status Update  (24 hours, problem oriented, brief subjective history, new lab/imaging data pertinent to that problem)     - pt seen and examined at bedside this AM, stable and NAD; no AEON  - headache symptomatically improved, no blurred vision; Decadron to continue at present dose  - NeuroSx: starting Keppra 500 mg IVPB q12h for seizure ppx  - NIHSS 0, gait normal, motor and sensory as well reflexes intact b/l but limited fine motor dexterity in left hand, MMSE wnl  - AO x4, normal affect and mood  - CT: b/l lung masses in addition to brain lesions  - Testicular U/S: normal dimensions of testes, no suspicious masses, no masses on palpation of testicles  - Tumor markers: LDH, bHCG wnl, AFP awaited, CEA, , 19-9, 15-3, PSA ordered  - Skin assessment: multiple nevi over back including one larger than remaining over left flank, pt unsure of timeline; hypopigmented papule over left side of lower face, pt states has been constant size  - IR guided biopsy of lung mass today - histopathological report awaited, pt tolerated procedure well  - detailed discussion at bedside with pt and his family about current clinical scenario and diagnostic and therapeutic options        Review of Systems   Constitutional: Negative for chills, fever and weight loss.   HENT: Negative for congestion, hearing loss, sinus pain and tinnitus.    Eyes: Negative for blurred vision,  double vision and photophobia.   Respiratory: Negative for cough, sputum production and shortness of breath.    Cardiovascular: Negative for chest pain, palpitations and leg swelling.   Gastrointestinal: Negative for abdominal pain, blood in stool, constipation, diarrhea, nausea and vomiting.   Genitourinary: Negative for dysuria, frequency and urgency.   Musculoskeletal: Negative for back pain, falls, joint pain, myalgias and neck pain.   Skin: Negative for itching and rash.   Neurological: Positive for focal weakness and headaches. Negative for dizziness, tingling, tremors, sensory change, speech change, seizures, loss of consciousness and weakness.   Endo/Heme/Allergies: Negative for environmental allergies. Does not bruise/bleed easily.   Psychiatric/Behavioral: Negative for depression and suicidal ideas. The patient is not nervous/anxious and does not have insomnia.        Disposition/Barriers to discharge:   Inpatient. Active medical w/u and management     Consultants/Specialty  NSRONEY- Dr Jose Juan PIÑA    PCP: KIAN Rod      Quality Measures  Quality-Core Measures   Reviewed items::  Labs reviewed, Medications reviewed and Radiology images reviewed  Suarez catheter::  No Suarez  DVT prophylaxis pharmacological::  Not indicated at this time, ambulatory  DVT prophylaxis - mechanical:  SCDs  Ulcer Prophylaxis::  No          Physical Exam       Vitals:    11/17/18 1100 11/17/18 1115 11/17/18 1145 11/17/18 1215   BP: 146/76 128/74 115/70 117/72   Pulse: 72 72 81 86   Resp: 18 20 18 20   Temp: 37.3 °C (99.1 °F) 37.3 °C (99.1 °F) 37.1 °C (98.8 °F) 37.1 °C (98.7 °F)   TempSrc: Temporal Temporal Temporal Temporal   SpO2: 96% 95% 94% 95%   Weight:       Height:         Body mass index is 23.68 kg/m².    Oxygen Therapy:  Pulse Oximetry: 95 %, O2 (LPM): 0, O2 Delivery: None (Room Air)    Physical Exam   Constitutional: He is oriented to person, place, and time and well-developed, well-nourished, and in no  distress. No distress.   HENT:   Head: Normocephalic and atraumatic.   Right Ear: External ear normal.   Left Ear: External ear normal.   Nose: Nose normal.   Mouth/Throat: No oropharyngeal exudate.   Unpigmented papule over left side of lower face  No suspicious lesions over scalp   Eyes: Conjunctivae and EOM are normal. Right eye exhibits no discharge. Left eye exhibits no discharge.   Neck: Normal range of motion. Neck supple. No thyromegaly present.   Cardiovascular: Normal rate, regular rhythm and normal heart sounds.    No murmur heard.  Pulmonary/Chest: Effort normal and breath sounds normal. No respiratory distress. He has no rales. He exhibits no tenderness.   Abdominal: Soft. Bowel sounds are normal. He exhibits no distension and no mass. There is no tenderness.   Genitourinary: Penis normal. No discharge found.   Musculoskeletal: Normal range of motion. He exhibits no edema, tenderness or deformity.   Lymphadenopathy:     He has no cervical adenopathy.   Neurological: He is alert and oriented to person, place, and time. No cranial nerve deficit. He exhibits normal muscle tone. Gait normal. GCS score is 15.   Skin: Skin is warm and dry. No rash noted. He is not diaphoretic. No erythema. No pallor.   Psychiatric: Mood, memory, affect and judgment normal.   Nursing note and vitals reviewed.            Assessment/Plan     * Lesion of right frontal lobe of brain- (present on admission)   Assessment & Plan    - A/w headache with neurological deficits. MR brain: 2.5 cm lesion a/w brain edema and 9 mm midline shift, another smaller brain lesion beside this one. Appears metastatic, high suspicion for germ cell tumor/ lumphoma. Doesn't appear to be of infectious etiology   - NSGY on board, pan CT revealed lung masses also possible mets  - Testicular U/S revealed normal dimensions of testes    Plan:   - decadron IV 4 mg q 6 hours  - Keppra 500 mg IVPB q12h  - q 4 hour neuro checks  - SCDs for DVT prophylaxis   -  seizure/aspiration/fall precautions     Generalized headaches- (present on admission)   Assessment & Plan    - secondary to brain lesions  - no blurred vision  - symptomatically improved    Plan  - continue Decadron     Lung mass   Assessment & Plan    - bilateral masses in lungs, possible metastases  - no cough, weight loss, shortness of breath  - pt is a never smoker, but positive family hx of lung cancer    Plan  - IR guided lung mass biopsy for tissue diagnosis  - workup for primary     Cerebral edema (HCC)   Assessment & Plan    - secondary to brain mass, some midline shift  - normal vital signs, reduced headache, no blurred vision    Plan  - Continue decadron 4mg IV q6     Midline shift of brain   Assessment & Plan    Continue decadron 4mg IV q6, till edema subsides  Start Keppra 500 mg q12

## 2018-11-17 NOTE — ASSESSMENT & PLAN NOTE
- bilateral masses in lungs, possible metastases  - no cough, weight loss, shortness of breath  - pt is a never smoker, but positive family hx of lung cancer in uncle and grandfather  - Histopathology indicates primary adenocarcinoma of lung    Plan  - Oncology follow up after neurosurgical excision on 11/27, pt to be discharged today

## 2018-11-17 NOTE — OR SURGEON
Immediate Post- Operative Note        PostOp Diagnosis: LEFT lung mass      Procedure(s): LEFT lung biopsy CT      Estimated Blood Loss: Less than 5 ml        Complications: None            11/17/2018     10:02 AM     Gurjit Herbert

## 2018-11-18 LAB
ALBUMIN SERPL BCP-MCNC: 3.9 G/DL (ref 3.2–4.9)
ALBUMIN/GLOB SERPL: 1.1 G/DL
ALP SERPL-CCNC: 74 U/L (ref 30–99)
ALT SERPL-CCNC: 37 U/L (ref 2–50)
ANION GAP SERPL CALC-SCNC: 8 MMOL/L (ref 0–11.9)
AST SERPL-CCNC: 20 U/L (ref 12–45)
BASOPHILS # BLD AUTO: 0 % (ref 0–1.8)
BASOPHILS # BLD: 0 K/UL (ref 0–0.12)
BILIRUB SERPL-MCNC: 0.4 MG/DL (ref 0.1–1.5)
BUN SERPL-MCNC: 30 MG/DL (ref 8–22)
CALCIUM SERPL-MCNC: 9.5 MG/DL (ref 8.5–10.5)
CANCER AG125 SERPL-ACNC: 21.7 U/ML (ref 0–35)
CANCER AG19-9 SERPL-ACNC: 7.9 U/ML (ref 0–35)
CEA SERPL-MCNC: 1.5 NG/ML (ref 0–3)
CHLORIDE SERPL-SCNC: 101 MMOL/L (ref 96–112)
CO2 SERPL-SCNC: 26 MMOL/L (ref 20–33)
CREAT SERPL-MCNC: 0.91 MG/DL (ref 0.5–1.4)
EOSINOPHIL # BLD AUTO: 0 K/UL (ref 0–0.51)
EOSINOPHIL NFR BLD: 0 % (ref 0–6.9)
ERYTHROCYTE [DISTWIDTH] IN BLOOD BY AUTOMATED COUNT: 35.3 FL (ref 35.9–50)
GLOBULIN SER CALC-MCNC: 3.7 G/DL (ref 1.9–3.5)
GLUCOSE SERPL-MCNC: 157 MG/DL (ref 65–99)
HCT VFR BLD AUTO: 44.9 % (ref 42–52)
HGB BLD-MCNC: 14.8 G/DL (ref 14–18)
IMM GRANULOCYTES # BLD AUTO: 0.03 K/UL (ref 0–0.11)
IMM GRANULOCYTES NFR BLD AUTO: 0.3 % (ref 0–0.9)
LYMPHOCYTES # BLD AUTO: 0.79 K/UL (ref 1–4.8)
LYMPHOCYTES NFR BLD: 8.3 % (ref 22–41)
MCH RBC QN AUTO: 27.2 PG (ref 27–33)
MCHC RBC AUTO-ENTMCNC: 33 G/DL (ref 33.7–35.3)
MCV RBC AUTO: 82.4 FL (ref 81.4–97.8)
MONOCYTES # BLD AUTO: 0.43 K/UL (ref 0–0.85)
MONOCYTES NFR BLD AUTO: 4.5 % (ref 0–13.4)
NEUTROPHILS # BLD AUTO: 8.31 K/UL (ref 1.82–7.42)
NEUTROPHILS NFR BLD: 86.9 % (ref 44–72)
NRBC # BLD AUTO: 0 K/UL
NRBC BLD-RTO: 0 /100 WBC
PLATELET # BLD AUTO: 325 K/UL (ref 164–446)
PMV BLD AUTO: 8.6 FL (ref 9–12.9)
POTASSIUM SERPL-SCNC: 4 MMOL/L (ref 3.6–5.5)
PROT SERPL-MCNC: 7.6 G/DL (ref 6–8.2)
RBC # BLD AUTO: 5.45 M/UL (ref 4.7–6.1)
SODIUM SERPL-SCNC: 135 MMOL/L (ref 135–145)
WBC # BLD AUTO: 9.6 K/UL (ref 4.8–10.8)

## 2018-11-18 PROCEDURE — 700111 HCHG RX REV CODE 636 W/ 250 OVERRIDE (IP): Performed by: INTERNAL MEDICINE

## 2018-11-18 PROCEDURE — 99233 SBSQ HOSP IP/OBS HIGH 50: CPT | Mod: GC | Performed by: HOSPITALIST

## 2018-11-18 PROCEDURE — 86304 IMMUNOASSAY TUMOR CA 125: CPT

## 2018-11-18 PROCEDURE — 80053 COMPREHEN METABOLIC PANEL: CPT

## 2018-11-18 PROCEDURE — 700105 HCHG RX REV CODE 258: Performed by: STUDENT IN AN ORGANIZED HEALTH CARE EDUCATION/TRAINING PROGRAM

## 2018-11-18 PROCEDURE — 82378 CARCINOEMBRYONIC ANTIGEN: CPT

## 2018-11-18 PROCEDURE — 86300 IMMUNOASSAY TUMOR CA 15-3: CPT

## 2018-11-18 PROCEDURE — 770001 HCHG ROOM/CARE - MED/SURG/GYN PRIV*

## 2018-11-18 PROCEDURE — 700102 HCHG RX REV CODE 250 W/ 637 OVERRIDE(OP): Performed by: STUDENT IN AN ORGANIZED HEALTH CARE EDUCATION/TRAINING PROGRAM

## 2018-11-18 PROCEDURE — 85025 COMPLETE CBC W/AUTO DIFF WBC: CPT

## 2018-11-18 PROCEDURE — 86301 IMMUNOASSAY TUMOR CA 19-9: CPT

## 2018-11-18 PROCEDURE — 84153 ASSAY OF PSA TOTAL: CPT

## 2018-11-18 PROCEDURE — A9270 NON-COVERED ITEM OR SERVICE: HCPCS | Performed by: STUDENT IN AN ORGANIZED HEALTH CARE EDUCATION/TRAINING PROGRAM

## 2018-11-18 PROCEDURE — 36415 COLL VENOUS BLD VENIPUNCTURE: CPT

## 2018-11-18 PROCEDURE — 700111 HCHG RX REV CODE 636 W/ 250 OVERRIDE (IP): Performed by: STUDENT IN AN ORGANIZED HEALTH CARE EDUCATION/TRAINING PROGRAM

## 2018-11-18 RX ORDER — LEVETIRACETAM 500 MG/1
500 TABLET ORAL 2 TIMES DAILY
Status: DISCONTINUED | OUTPATIENT
Start: 2018-11-18 | End: 2018-11-21 | Stop reason: HOSPADM

## 2018-11-18 RX ADMIN — LEVETIRACETAM 500 MG: 500 TABLET ORAL at 17:32

## 2018-11-18 RX ADMIN — SODIUM CHLORIDE 500 MG: 9 INJECTION, SOLUTION INTRAVENOUS at 06:05

## 2018-11-18 RX ADMIN — DEXAMETHASONE SODIUM PHOSPHATE 4 MG: 4 INJECTION, SOLUTION INTRA-ARTICULAR; INTRALESIONAL; INTRAMUSCULAR; INTRAVENOUS; SOFT TISSUE at 06:05

## 2018-11-18 RX ADMIN — DEXAMETHASONE SODIUM PHOSPHATE 4 MG: 4 INJECTION, SOLUTION INTRA-ARTICULAR; INTRALESIONAL; INTRAMUSCULAR; INTRAVENOUS; SOFT TISSUE at 00:05

## 2018-11-18 RX ADMIN — DEXAMETHASONE SODIUM PHOSPHATE 4 MG: 4 INJECTION, SOLUTION INTRA-ARTICULAR; INTRALESIONAL; INTRAMUSCULAR; INTRAVENOUS; SOFT TISSUE at 17:32

## 2018-11-18 RX ADMIN — DEXAMETHASONE SODIUM PHOSPHATE 4 MG: 4 INJECTION, SOLUTION INTRA-ARTICULAR; INTRALESIONAL; INTRAMUSCULAR; INTRAVENOUS; SOFT TISSUE at 11:30

## 2018-11-18 ASSESSMENT — ENCOUNTER SYMPTOMS
DOUBLE VISION: 0
NAUSEA: 0
COUGH: 0
BRUISES/BLEEDS EASILY: 0
DEPRESSION: 0
VOMITING: 0
SINUS PAIN: 0
HEADACHES: 1
NERVOUS/ANXIOUS: 0
WEAKNESS: 0
DIARRHEA: 0
DIZZINESS: 0
PHOTOPHOBIA: 0
SPEECH CHANGE: 0
TREMORS: 0
FOCAL WEAKNESS: 1
ABDOMINAL PAIN: 0
BACK PAIN: 0
WEIGHT LOSS: 0
NECK PAIN: 0
PALPITATIONS: 0
CONSTIPATION: 0
BLURRED VISION: 1
TINGLING: 0
FEVER: 0
SPUTUM PRODUCTION: 0
SENSORY CHANGE: 0
SEIZURES: 0
INSOMNIA: 0
FALLS: 0
BLOOD IN STOOL: 0
CHILLS: 0
MYALGIAS: 0
SHORTNESS OF BREATH: 0
LOSS OF CONSCIOUSNESS: 0

## 2018-11-18 ASSESSMENT — PAIN SCALES - GENERAL
PAINLEVEL_OUTOF10: 3
PAINLEVEL_OUTOF10: 3
PAINLEVEL_OUTOF10: 0
PAINLEVEL_OUTOF10: 3
PAINLEVEL_OUTOF10: 0
PAINLEVEL_OUTOF10: 3

## 2018-11-18 NOTE — PROGRESS NOTES
Internal Medicine Interval Note  Note Author: Marcus Lazar M.D.     Name Keanu Trivedi       1995   Age/Sex 23 y.o. male   MRN [id number]   Code Status Full     After 5PM or if no immediate response to page, please call for cross-coverage  Attending/Team: Dr Gray/ David See Patient List for primary contact information  Call [phone] to page    1st Call - Day Intern (R1):   Dr Lazar  2nd Call - Day Sr. Resident (R2/R3):   Dr Bocanegra          Reason for interval visit  (Principal Problem)   Brain masses   Lung masses  Headache    Interval Problem Daily Status Update  (24 hours, problem oriented, brief subjective history, new lab/imaging data pertinent to that problem)     - pt seen and examined at bedside this AM, stable and NAD, ambulant; no AEON  - c/o some headache right temporal location now subsided, Tylenol PRN, some blurring of vision, no sensorimotor findings, no seizure activity  - Decadron and Keppra to continue per Nsx - NSx consult in AM on possible repeat CT head to delineate change in size/ mass effect  - IR guided biopsy  well tolerated with minimal blood loss, histopath report awaited for tissue diagnosis  - bHCG, , , CEA, LDH wnl; AFP, PSA, CA 15-3 in process  - hypercalcemia noted on labs, SPEP in process  - testicular exam wnl, no cervical, axillary or inguinal or popliteal lymphadenopathy      Review of Systems   Constitutional: Negative for chills, fever and weight loss.   HENT: Negative for congestion, hearing loss, sinus pain and tinnitus.    Eyes: Positive for blurred vision. Negative for double vision and photophobia.   Respiratory: Negative for cough, sputum production and shortness of breath.    Cardiovascular: Negative for chest pain, palpitations and leg swelling.   Gastrointestinal: Negative for abdominal pain, blood in stool, constipation, diarrhea, nausea and vomiting.   Genitourinary: Negative for dysuria, frequency and urgency.    Musculoskeletal: Negative for back pain, falls, joint pain, myalgias and neck pain.   Skin: Negative for itching and rash.   Neurological: Positive for focal weakness and headaches. Negative for dizziness, tingling, tremors, sensory change, speech change, seizures, loss of consciousness and weakness.   Endo/Heme/Allergies: Negative for environmental allergies. Does not bruise/bleed easily.   Psychiatric/Behavioral: Negative for depression and suicidal ideas. The patient is not nervous/anxious and does not have insomnia.        Disposition/Barriers to discharge:   Inpatient. Active medical w/u and management     Consultants/Specialty  NSGY- Dr Jose Juan PIÑA    PCP: YINKA RodPAbdulkadirNAbdulkadir      Quality Measures  Quality-Core Measures   Reviewed items::  Labs reviewed, Medications reviewed and Radiology images reviewed  Suarez catheter::  No Suarez  DVT prophylaxis pharmacological::  Not indicated at this time, ambulatory  DVT prophylaxis - mechanical:  SCDs  Ulcer Prophylaxis::  No          Physical Exam       Vitals:    11/17/18 1600 11/17/18 2000 11/18/18 0400 11/18/18 0736   BP: 126/81 121/79 136/83 137/86   Pulse: (!) 59 80 65 71   Resp: 18 16 16 20   Temp: 37.2 °C (99 °F) 36.4 °C (97.6 °F) 36.4 °C (97.5 °F) 36.8 °C (98.2 °F)   TempSrc: Temporal Temporal Temporal Temporal   SpO2: 96% 93% 96% 93%   Weight:       Height:         Body mass index is 23.68 kg/m².    Oxygen Therapy:  Pulse Oximetry: 93 %, O2 (LPM): 0, O2 Delivery: None (Room Air)    Physical Exam   Constitutional: He is oriented to person, place, and time and well-developed, well-nourished, and in no distress. No distress.   HENT:   Head: Normocephalic and atraumatic.   Right Ear: External ear normal.   Left Ear: External ear normal.   Nose: Nose normal.   Mouth/Throat: No oropharyngeal exudate.   Unpigmented papule over left side of lower face  No suspicious lesions over scalp   Eyes: Conjunctivae and EOM are normal. Right eye exhibits no discharge. Left  eye exhibits no discharge.   Neck: Normal range of motion. Neck supple. No thyromegaly present.   Cardiovascular: Normal rate, regular rhythm and normal heart sounds.    No murmur heard.  Pulmonary/Chest: Effort normal and breath sounds normal. No respiratory distress. He has no rales. He exhibits no tenderness.   Abdominal: Soft. Bowel sounds are normal. He exhibits no distension and no mass. There is no tenderness.   Genitourinary: Penis normal. No discharge found.   Musculoskeletal: Normal range of motion. He exhibits no edema, tenderness or deformity.   Lymphadenopathy:     He has no cervical adenopathy.   Neurological: He is alert and oriented to person, place, and time. No cranial nerve deficit. He exhibits normal muscle tone. Gait normal. GCS score is 15.   Skin: Skin is warm and dry. No rash noted. He is not diaphoretic. No erythema. No pallor.   Psychiatric: Mood, memory, affect and judgment normal.   Nursing note and vitals reviewed.            Assessment/Plan     * Lesion of right frontal lobe of brain- (present on admission)   Assessment & Plan    - A/w headache with neurological deficits. MR brain: 2.5 cm lesion a/w brain edema and 9 mm midline shift, another smaller brain lesion beside this one. Appears metastatic, high suspicion for germ cell tumor/ lumphoma. Doesn't appear to be of infectious etiology   - NSGY on board, pan CT revealed lung masses also possible mets  - Testicular U/S revealed normal dimensions of testes    Plan:   - decadron IV 4 mg q 6 hours  - Keppra 500 mg IVPB q12h  - q 4 hour neuro checks  - SCDs for DVT prophylaxis   - seizure/aspiration/fall precautions     Generalized headaches- (present on admission)   Assessment & Plan    - secondary to brain lesions  - no blurred vision  - symptomatically improved    Plan  - continue Decadron     Lung mass   Assessment & Plan    - bilateral masses in lungs, possible metastases  - no cough, weight loss, shortness of breath  - pt is a never  smoker, but positive family hx of lung cancer    Plan  - continue to monitor, histopathological diagnosis of lung mass biopsy awaited  - workup for primary     Cerebral edema (HCC)   Assessment & Plan    - secondary to brain mass, some midline shift  - normal vital signs, reduced headache, no blurred vision    Plan  - Continue decadron 4mg IV q6     Midline shift of brain   Assessment & Plan    Continue decadron 4mg IV q6, till edema subsides  Continue Keppra 500 mg q12

## 2018-11-18 NOTE — PROGRESS NOTES
Received pt report at 1900. A/Ox4, denies pain and discomfort at this time. Family at bedside. VS stable. Medicated per MAR. Ambulatory with steady gait. Hourly rounding in place.

## 2018-11-18 NOTE — CARE PLAN
Problem: Safety  Goal: Will remain free from falls  Outcome: PROGRESSING AS EXPECTED  Low risk fall precautions and interventions in place. Pt educated to ring call light for assistance.    Problem: Pain Management  Goal: Pain level will decrease to patient's comfort goal  Outcome: PROGRESSING AS EXPECTED  Pt denied need for pain medication. Pt educated on pain medications and interventions available.

## 2018-11-18 NOTE — PROGRESS NOTES
Neurosurgery Progress Note    Subjective:  Hospital day #3  Brain and Lung masses   Persistent left arm weakness  Brain MRI shows right frontal lesion with edema and smaller Left cerabellar lesion  Headache is better with steroids, still has weakness  CT Chest with Larger Left lung and smaller Right lung lesions Biopsy done yeasterday.  Testicular U/s Negative for lesions  Will see if Pulmonology/Interventional Radiology can get Primary DX from lung biopsy.  Pt denies any significant medical history, smoking, testicular lesions etc     Exam:  A&O x 3   Appropriate   Weak to left C5, C7, C8; rest is normal   No drift / EOM intact   CN 2-12 grossly intact     BP  Min: 115/70  Max: 137/86  Pulse  Av  Min: 59  Max: 86  Resp  Av.3  Min: 16  Max: 20  Temp  Av.9 °C (98.4 °F)  Min: 36.4 °C (97.5 °F)  Max: 37.2 °C (99 °F)  SpO2  Av.5 %  Min: 93 %  Max: 96 %    No Data Recorded    Recent Labs      18   0329  18   0353  18   0247   WBC  4.7*  10.4  9.6   RBC  6.07  5.82  5.45   HEMOGLOBIN  16.6  15.4  14.8   HEMATOCRIT  49.1  48.3  44.9   MCV  80.9*  83.0  82.4   MCH  27.3  26.5*  27.2   MCHC  33.8  31.9*  33.0*   RDW  34.2*  35.8*  35.3*   PLATELETCT  309  331  325   MPV  8.2*  8.3*  8.6*     Recent Labs      18   0328  18   0353  18   0247   SODIUM  138  137  135   POTASSIUM  4.4  4.8  4.0   CHLORIDE  102  102  101   CO2  25  25  26   GLUCOSE  143*  139*  157*   BUN  21  30*  30*   CREATININE  0.97  1.11  0.91   CALCIUM  10.6*  10.6*  9.5     Recent Labs      11/15/18   1615   APTT  37.8*   INR  1.10           Intake/Output     None          No intake or output data in the 24 hours ending 18 1116         • Pharmacy Consult Request  1 Each PRN   • levETIRAcetam (KEPPRA) IV  500 mg Q12HRS   • dexamethasone  4 mg Q6HRS   • senna-docusate  2 Tab BID    And   • polyethylene glycol/lytes  1 Packet QDAY PRN    And   • magnesium hydroxide  30 mL QDAY PRN    And   •  bisacodyl  10 mg QDAY PRN   • labetalol  10 mg Q4HRS PRN   • acetaminophen  650 mg Q6HRS PRN       Assessment and Plan:  Hospital day #3  CT Chest with Large left Lung and smaller right lung mass  Right frontal and Left cerabellar masses noted and appear to be metastatic seedings   Testicular U/S negative  Will await result of lung lesion biopsies   Brain biopsy only if needed   Will follow closely   Continue steroids

## 2018-11-18 NOTE — CARE PLAN
Problem: Safety  Goal: Will remain free from falls  Outcome: PROGRESSING AS EXPECTED  Hourly rounding in place  Call light within pt reach  Bed low and locked  Padded side rails up    Problem: Pain Management  Goal: Pain level will decrease to patient's comfort goal  Outcome: PROGRESSING AS EXPECTED

## 2018-11-19 PROBLEM — R97.8 ELEVATED TUMOR MARKERS: Status: ACTIVE | Noted: 2018-11-19

## 2018-11-19 LAB
ALBUMIN SERPL-MCNC: 4.21 G/DL (ref 3.75–5.01)
ALPHA1 GLOB SERPL ELPH-MCNC: 0.45 G/DL (ref 0.19–0.46)
ALPHA2 GLOB SERPL ELPH-MCNC: 1.06 G/DL (ref 0.48–1.05)
B-GLOBULIN SERPL ELPH-MCNC: 1.06 G/DL (ref 0.48–1.1)
CANCER AG15-3 SERPL-ACNC: 83 U/ML (ref 0–31)
EER PROT ELECT SER Q1092: ABNORMAL
GAMMA GLOB SERPL ELPH-MCNC: 1.62 G/DL (ref 0.62–1.51)
INTERPRETATION SERPL IFE-IMP: ABNORMAL
PATHOLOGY CONSULT NOTE: NORMAL
PROT SERPL-MCNC: 8.4 G/DL (ref 6–8.3)

## 2018-11-19 PROCEDURE — 700111 HCHG RX REV CODE 636 W/ 250 OVERRIDE (IP): Performed by: NEUROLOGICAL SURGERY

## 2018-11-19 PROCEDURE — 99233 SBSQ HOSP IP/OBS HIGH 50: CPT | Mod: GC | Performed by: HOSPITALIST

## 2018-11-19 PROCEDURE — 700111 HCHG RX REV CODE 636 W/ 250 OVERRIDE (IP): Performed by: INTERNAL MEDICINE

## 2018-11-19 PROCEDURE — 700102 HCHG RX REV CODE 250 W/ 637 OVERRIDE(OP): Performed by: STUDENT IN AN ORGANIZED HEALTH CARE EDUCATION/TRAINING PROGRAM

## 2018-11-19 PROCEDURE — 770001 HCHG ROOM/CARE - MED/SURG/GYN PRIV*

## 2018-11-19 PROCEDURE — A9270 NON-COVERED ITEM OR SERVICE: HCPCS | Performed by: STUDENT IN AN ORGANIZED HEALTH CARE EDUCATION/TRAINING PROGRAM

## 2018-11-19 RX ORDER — DEXAMETHASONE SODIUM PHOSPHATE 4 MG/ML
4 INJECTION, SOLUTION INTRA-ARTICULAR; INTRALESIONAL; INTRAMUSCULAR; INTRAVENOUS; SOFT TISSUE EVERY 8 HOURS
Status: DISCONTINUED | OUTPATIENT
Start: 2018-11-19 | End: 2018-11-21

## 2018-11-19 RX ADMIN — LEVETIRACETAM 500 MG: 500 TABLET ORAL at 17:00

## 2018-11-19 RX ADMIN — DEXAMETHASONE SODIUM PHOSPHATE 4 MG: 4 INJECTION, SOLUTION INTRA-ARTICULAR; INTRALESIONAL; INTRAMUSCULAR; INTRAVENOUS; SOFT TISSUE at 00:04

## 2018-11-19 RX ADMIN — DEXAMETHASONE SODIUM PHOSPHATE 4 MG: 4 INJECTION, SOLUTION INTRA-ARTICULAR; INTRALESIONAL; INTRAMUSCULAR; INTRAVENOUS; SOFT TISSUE at 05:46

## 2018-11-19 RX ADMIN — LEVETIRACETAM 500 MG: 500 TABLET ORAL at 05:46

## 2018-11-19 RX ADMIN — DEXAMETHASONE SODIUM PHOSPHATE 4 MG: 4 INJECTION, SOLUTION INTRA-ARTICULAR; INTRALESIONAL; INTRAMUSCULAR; INTRAVENOUS; SOFT TISSUE at 11:54

## 2018-11-19 RX ADMIN — DEXAMETHASONE SODIUM PHOSPHATE 4 MG: 4 INJECTION, SOLUTION INTRA-ARTICULAR; INTRALESIONAL; INTRAMUSCULAR; INTRAVENOUS; SOFT TISSUE at 23:10

## 2018-11-19 ASSESSMENT — ENCOUNTER SYMPTOMS
BACK PAIN: 0
SPEECH CHANGE: 0
INSOMNIA: 0
FOCAL WEAKNESS: 0
MYALGIAS: 0
COUGH: 0
VOMITING: 0
TINGLING: 0
ABDOMINAL PAIN: 0
DOUBLE VISION: 0
WEAKNESS: 0
BRUISES/BLEEDS EASILY: 0
FALLS: 0
TREMORS: 0
PHOTOPHOBIA: 0
SHORTNESS OF BREATH: 0
SINUS PAIN: 0
NECK PAIN: 0
NAUSEA: 0
NERVOUS/ANXIOUS: 0
SENSORY CHANGE: 0
LOSS OF CONSCIOUSNESS: 0
SPUTUM PRODUCTION: 0
DEPRESSION: 0
DIZZINESS: 0
PALPITATIONS: 0
SEIZURES: 0
WEIGHT LOSS: 0
BLURRED VISION: 1
DIARRHEA: 0
CONSTIPATION: 0
BLOOD IN STOOL: 0
HEADACHES: 1
CHILLS: 0
FEVER: 0

## 2018-11-19 ASSESSMENT — PAIN SCALES - GENERAL
PAINLEVEL_OUTOF10: 0

## 2018-11-19 NOTE — ASSESSMENT & PLAN NOTE
- elevated CA 15-3, breast cancer marker    Plan  - to correlate clinically and with histopath report

## 2018-11-19 NOTE — PROGRESS NOTES
Neurosurgery Progress Note    Subjective:  Hospital day #5  Brain and Lung masses   Pt stable   Left arm weakness basically gone  Headaches much better   Brain MRI shows right frontal lesions with edema and smaller Left cerabellar lesion  Body CT shows bilateral lung lesions, left was biopsied Saturday   Testicular U/s Negative for lesions  Pt denies any significant medical history, smoking, testicular lesions etc     Exam:  A&O x 3   Appropriate   Upper extremity motor is 5/5  No drift / EOM intact   CN 2-12 grossly intact     BP  Min: 117/81  Max: 130/85  Pulse  Av.3  Min: 50  Max: 76  Resp  Av  Min: 16  Max: 16  Temp  Av.8 °C (98.2 °F)  Min: 36.5 °C (97.7 °F)  Max: 37.3 °C (99.1 °F)  SpO2  Av.5 %  Min: 93 %  Max: 97 %    No Data Recorded    Recent Labs      18   0353  18   0247   WBC  10.4  9.6   RBC  5.82  5.45   HEMOGLOBIN  15.4  14.8   HEMATOCRIT  48.3  44.9   MCV  83.0  82.4   MCH  26.5*  27.2   MCHC  31.9*  33.0*   RDW  35.8*  35.3*   PLATELETCT  331  325   MPV  8.3*  8.6*     Recent Labs      18   0353  18   0247   SODIUM  137  135   POTASSIUM  4.8  4.0   CHLORIDE  102  101   CO2  25  26   GLUCOSE  139*  157*   BUN  30*  30*   CREATININE  1.11  0.91   CALCIUM  10.6*  9.5               Intake/Output       18 0700 - 18 0659 18 0700 - 18 0659       Total 1900-0659 Total       Intake    P.O.    -- 1080  --  -- --    P.O.  --  -- -- --    Total Intake  --  -- -- --       Output    Total Output -- -- -- -- -- --       Net I/O      -- 1080 -- -- --            Intake/Output Summary (Last 24 hours) at 18 0833  Last data filed at 18 1759   Gross per 24 hour   Intake              720 ml   Output                0 ml   Net              720 ml            • levETIRAcetam  500 mg BID   • Pharmacy Consult Request  1 Each PRN   • dexamethasone  4 mg Q6HRS   • senna-docusate  2 Tab BID    And    • polyethylene glycol/lytes  1 Packet QDAY PRN    And   • magnesium hydroxide  30 mL QDAY PRN    And   • bisacodyl  10 mg QDAY PRN   • labetalol  10 mg Q4HRS PRN   • acetaminophen  650 mg Q6HRS PRN       Assessment and Plan:  Hospital day #5  Awaiting final path from lung biopsy   Brain biopsy only if needed   Will follow closely   Continue steroids / keppra

## 2018-11-19 NOTE — PROGRESS NOTES
Pt A&Ox4. C/o headache 3/10 throughout day but denied need for pain medication. Neuro checks WDL. Pt ambulating in room independently. Tolerating diet. Family present and supportive at bedside throughout day. VSS.

## 2018-11-19 NOTE — CARE PLAN
Problem: Safety  Goal: Will remain free from injury  Outcome: PROGRESSING AS EXPECTED  Pt is up self with a steady gait.     Problem: Pain Management  Goal: Pain level will decrease to patient's comfort goal  Outcome: PROGRESSING AS EXPECTED  Pt has an intermittent headache. No headache at this time.   Pt educated to notify RN when it returns.

## 2018-11-19 NOTE — PROGRESS NOTES
Internal Medicine Interval Note  Note Author: Marcus Lazar M.D.     Name Keanu Trivedi 1995   Age/Sex 23 y.o. male   MRN [id number]   Code Status Full     After 5PM or if no immediate response to page, please call for cross-coverage  Attending/Team: Dr Gray/ David See Patient List for primary contact information  Call [phone] to page    1st Call - Day Intern (R1):   Dr Lazar  2nd Call - Day Sr. Resident (R2/R3):   Dr Araujo         Reason for interval visit  (Principal Problem)   Brain masses   Lung masses  Headache    Interval Problem Daily Status Update  (24 hours, problem oriented, brief subjective history, new lab/imaging data pertinent to that problem)     - pt seen and examined at bedside this AM, stable and NAD, no AEON  - no new complaints, headache and blurred vision improved, NIHSS 0, pt ambulant without aid  - Decadron and Keppra to continue, no indication for repeat imaging at this time  - histopath report of lung mass biopsy still awaited, earliest by tomorrow afternoon per Path  - Oncology to follow up when tissue diagnosis  - tumor markers so far negative, but AFP, PSA in process; CA 15-3 above normal; SPEP in process      Review of Systems   Constitutional: Negative for chills, fever and weight loss.   HENT: Negative for congestion, hearing loss, sinus pain and tinnitus.    Eyes: Positive for blurred vision. Negative for double vision and photophobia.   Respiratory: Negative for cough, sputum production and shortness of breath.    Cardiovascular: Negative for chest pain, palpitations and leg swelling.   Gastrointestinal: Negative for abdominal pain, blood in stool, constipation, diarrhea, nausea and vomiting.   Genitourinary: Negative for dysuria, frequency and urgency.   Musculoskeletal: Negative for back pain, falls, joint pain, myalgias and neck pain.   Skin: Negative for itching and rash.   Neurological: Positive for headaches. Negative for dizziness, tingling,  tremors, sensory change, speech change, focal weakness, seizures, loss of consciousness and weakness.   Endo/Heme/Allergies: Negative for environmental allergies. Does not bruise/bleed easily.   Psychiatric/Behavioral: Negative for depression and suicidal ideas. The patient is not nervous/anxious and does not have insomnia.        Disposition/Barriers to discharge:   Inpatient. Active medical w/u and management     Consultants/Specialty  NSGY- Dr Manzano   IR  Oncology  PCP: KIAN Rod      Quality Measures  Quality-Core Measures   Reviewed items::  Labs reviewed, Medications reviewed and Radiology images reviewed  Suarez catheter::  No Suarez  DVT prophylaxis pharmacological::  Not indicated at this time, ambulatory  DVT prophylaxis - mechanical:  SCDs  Ulcer Prophylaxis::  No          Physical Exam       Vitals:    11/18/18 1637 11/18/18 2000 11/19/18 0400 11/19/18 0748   BP: 117/81 130/85 118/78 121/70   Pulse: 67 76 60 (!) 50   Resp: 16 16 16 16   Temp: 37.3 °C (99.1 °F) 36.7 °C (98 °F) 36.6 °C (97.8 °F) 36.5 °C (97.7 °F)   TempSrc: Temporal Temporal Temporal Oral   SpO2: 96% 93% 96% 97%   Weight:       Height:         Body mass index is 23.68 kg/m².    Oxygen Therapy:  Pulse Oximetry: 97 %, O2 (LPM): 0, O2 Delivery: None (Room Air)    Physical Exam   Constitutional: He is oriented to person, place, and time and well-developed, well-nourished, and in no distress. No distress.   HENT:   Head: Normocephalic and atraumatic.   Right Ear: External ear normal.   Left Ear: External ear normal.   Nose: Nose normal.   Mouth/Throat: No oropharyngeal exudate.   Unpigmented papule over left side of lower face  No suspicious lesions over scalp   Eyes: Conjunctivae and EOM are normal. Right eye exhibits no discharge. Left eye exhibits no discharge.   Neck: Normal range of motion. Neck supple. No thyromegaly present.   Cardiovascular: Normal rate, regular rhythm and normal heart sounds.    No murmur  heard.  Pulmonary/Chest: Effort normal and breath sounds normal. No respiratory distress. He has no rales. He exhibits no tenderness.   Abdominal: Soft. Bowel sounds are normal. He exhibits no distension and no mass. There is no tenderness.   Genitourinary: Penis normal. No discharge found.   Musculoskeletal: Normal range of motion. He exhibits no edema, tenderness or deformity.   Lymphadenopathy:     He has no cervical adenopathy.   Neurological: He is alert and oriented to person, place, and time. No cranial nerve deficit. He exhibits normal muscle tone. Gait normal. GCS score is 15.   Skin: Skin is warm and dry. No rash noted. He is not diaphoretic. No erythema. No pallor.   Multiple small pigmented nevi over back   Psychiatric: Mood, memory, affect and judgment normal.   Nursing note and vitals reviewed.            Assessment/Plan     * Lesion of right frontal lobe of brain- (present on admission)   Assessment & Plan    - A/w headache with neurological deficits. MR brain: 2.5 cm lesion a/w brain edema and 9 mm midline shift, another smaller brain lesion beside this one. Appears metastatic, high suspicion for germ cell tumor/ lumphoma. Doesn't appear to be of infectious etiology   - NSGY on board, pan CT revealed lung masses also possible mets  - Testicular U/S revealed normal dimensions of testes    Plan:   - decadron IV 4 mg q 6 hours  - Keppra 500 mg IVPB q12h  - q 4 hour neuro checks  - SCDs for DVT prophylaxis   - seizure/aspiration/fall precautions     Generalized headaches- (present on admission)   Assessment & Plan    - secondary to brain lesions  - no blurred vision  - symptomatically improved    Plan  - continue Decadron     Elevated tumor markers   Assessment & Plan    - elevated CA 15-3, breast cancer marker    Plan  - to correlate clinically and with histopath report     Lung mass   Assessment & Plan    - bilateral masses in lungs, possible metastases  - no cough, weight loss, shortness of breath  -  pt is a never smoker, but positive family hx of lung cancer    Plan  - continue to monitor, histopathological diagnosis of lung mass biopsy awaited  - workup for primary     Cerebral edema (HCC)   Assessment & Plan    - secondary to brain mass, some midline shift  - normal vital signs, reduced headache, no blurred vision    Plan  - Continue decadron 4mg IV q6     Midline shift of brain   Assessment & Plan    Continue decadron 4mg IV q6, till edema subsides  Continue Keppra 500 mg q12

## 2018-11-19 NOTE — PROGRESS NOTES
Assumed pt care at 1900. A/Ox4, no c/o of pain/discomfort. Medicated per MAR. Family at bedside. Bed low and locked. Calls appropriately. Hourly rounding in place

## 2018-11-19 NOTE — CARE PLAN
Problem: Safety  Goal: Will remain free from falls  Outcome: PROGRESSING AS EXPECTED  Hourly rounding in place      Problem: Bowel/Gastric:  Goal: Normal bowel function is maintained or improved  Outcome: PROGRESSING AS EXPECTED

## 2018-11-19 NOTE — PROGRESS NOTES
Received report from night nurse at the bedside. Assume care of Pt at 0700. Assessment completed Pt A&O X 4. Pt denies numbness and tingling, Pt denies pain but states his headache comes and goes. Pt is up self. Pt in bed, bed in lowest position, call light in place, treaded slipper socks on.    Family at bedside. Pt has no needs at this time.

## 2018-11-19 NOTE — PROGRESS NOTES
Internal Medicine Interval Note  Note Author: Marcus Lazar M.D.     Name Keanu Trivedi       1995   Age/Sex 23 y.o. male   MRN [id number]   Code Status Full     After 5PM or if no immediate response to page, please call for cross-coverage  Attending/Team: Dr Gray/ David See Patient List for primary contact information  Call [phone] to page    1st Call - Day Intern (R1):   Dr Lazar  2nd Call - Day Sr. Resident (R2/R3):   Dr Araujo         Reason for interval visit  (Principal Problem)   Brain masses   Lung masses  Headache    Interval Problem Daily Status Update  (24 hours, problem oriented, brief subjective history, new lab/imaging data pertinent to that problem)     - pt seen and examined at bedside this AM, stable and NAD, no AEON  - no new complaints, headache and blurred vision improved, NIHSS 0, pt ambulant without aid  - Decadron and Keppra to continue, no indication for repeat imaging at this time  - histopath report of lung mass biopsy still awaited, earliest by tomorrow afternoon per Path  - Oncology to follow up when tissue diagnosis  - tumor markers so far negative, but AFP, PSA, 15-3 in process; SPEP in process      Review of Systems   Constitutional: Negative for chills, fever and weight loss.   HENT: Negative for congestion, hearing loss, sinus pain and tinnitus.    Eyes: Positive for blurred vision. Negative for double vision and photophobia.   Respiratory: Negative for cough, sputum production and shortness of breath.    Cardiovascular: Negative for chest pain, palpitations and leg swelling.   Gastrointestinal: Negative for abdominal pain, blood in stool, constipation, diarrhea, nausea and vomiting.   Genitourinary: Negative for dysuria, frequency and urgency.   Musculoskeletal: Negative for back pain, falls, joint pain, myalgias and neck pain.   Skin: Negative for itching and rash.   Neurological: Positive for headaches. Negative for dizziness, tingling, tremors, sensory  change, speech change, focal weakness, seizures, loss of consciousness and weakness.   Endo/Heme/Allergies: Negative for environmental allergies. Does not bruise/bleed easily.   Psychiatric/Behavioral: Negative for depression and suicidal ideas. The patient is not nervous/anxious and does not have insomnia.        Disposition/Barriers to discharge:   Inpatient. Active medical w/u and management     Consultants/Specialty  UBALDO- Dr Jose Juan PIÑA    PCP: KIAN Rod      Quality Measures  Quality-Core Measures   Reviewed items::  Labs reviewed, Medications reviewed and Radiology images reviewed  Suarez catheter::  No Suarez  DVT prophylaxis pharmacological::  Not indicated at this time, ambulatory  DVT prophylaxis - mechanical:  SCDs  Ulcer Prophylaxis::  No          Physical Exam       Vitals:    11/18/18 1637 11/18/18 2000 11/19/18 0400 11/19/18 0748   BP: 117/81 130/85 118/78 121/70   Pulse: 67 76 60 (!) 50   Resp: 16 16 16 16   Temp: 37.3 °C (99.1 °F) 36.7 °C (98 °F) 36.6 °C (97.8 °F) 36.5 °C (97.7 °F)   TempSrc: Temporal Temporal Temporal Oral   SpO2: 96% 93% 96% 97%   Weight:       Height:         Body mass index is 23.68 kg/m².    Oxygen Therapy:  Pulse Oximetry: 97 %, O2 (LPM): 0, O2 Delivery: None (Room Air)    Physical Exam   Constitutional: He is oriented to person, place, and time and well-developed, well-nourished, and in no distress. No distress.   HENT:   Head: Normocephalic and atraumatic.   Right Ear: External ear normal.   Left Ear: External ear normal.   Nose: Nose normal.   Mouth/Throat: No oropharyngeal exudate.   Unpigmented papule over left side of lower face  No suspicious lesions over scalp   Eyes: Conjunctivae and EOM are normal. Right eye exhibits no discharge. Left eye exhibits no discharge.   Neck: Normal range of motion. Neck supple. No thyromegaly present.   Cardiovascular: Normal rate, regular rhythm and normal heart sounds.    No murmur heard.  Pulmonary/Chest: Effort normal and  breath sounds normal. No respiratory distress. He has no rales. He exhibits no tenderness.   Abdominal: Soft. Bowel sounds are normal. He exhibits no distension and no mass. There is no tenderness.   Genitourinary: Penis normal. No discharge found.   Musculoskeletal: Normal range of motion. He exhibits no edema, tenderness or deformity.   Lymphadenopathy:     He has no cervical adenopathy.   Neurological: He is alert and oriented to person, place, and time. No cranial nerve deficit. He exhibits normal muscle tone. Gait normal. GCS score is 15.   Skin: Skin is warm and dry. No rash noted. He is not diaphoretic. No erythema. No pallor.   Multiple small pigmented nevi over back   Psychiatric: Mood, memory, affect and judgment normal.   Nursing note and vitals reviewed.            Assessment/Plan     * Lesion of right frontal lobe of brain- (present on admission)   Assessment & Plan    - A/w headache with neurological deficits. MR brain: 2.5 cm lesion a/w brain edema and 9 mm midline shift, another smaller brain lesion beside this one. Appears metastatic, high suspicion for germ cell tumor/ lumphoma. Doesn't appear to be of infectious etiology   - NSGY on board, pan CT revealed lung masses also possible mets  - Testicular U/S revealed normal dimensions of testes    Plan:   - decadron IV 4 mg q 6 hours  - Keppra 500 mg IVPB q12h  - q 4 hour neuro checks  - SCDs for DVT prophylaxis   - seizure/aspiration/fall precautions     Generalized headaches- (present on admission)   Assessment & Plan    - secondary to brain lesions  - no blurred vision  - symptomatically improved    Plan  - continue Decadron     Lung mass   Assessment & Plan    - bilateral masses in lungs, possible metastases  - no cough, weight loss, shortness of breath  - pt is a never smoker, but positive family hx of lung cancer    Plan  - continue to monitor, histopathological diagnosis of lung mass biopsy awaited  - workup for primary     Cerebral edema (HCC)    Assessment & Plan    - secondary to brain mass, some midline shift  - normal vital signs, reduced headache, no blurred vision    Plan  - Continue decadron 4mg IV q6     Midline shift of brain   Assessment & Plan    Continue decadron 4mg IV q6, till edema subsides  Continue Keppra 500 mg q12

## 2018-11-20 LAB
AFP-TM SERPL-MCNC: 1 NG/ML (ref 0–9)
PSA FREE MFR SERPL: NORMAL %
PSA FREE SERPL-MCNC: NORMAL NG/ML
PSA SERPL-MCNC: 0.9 NG/ML (ref 0–4)

## 2018-11-20 PROCEDURE — 700102 HCHG RX REV CODE 250 W/ 637 OVERRIDE(OP): Performed by: STUDENT IN AN ORGANIZED HEALTH CARE EDUCATION/TRAINING PROGRAM

## 2018-11-20 PROCEDURE — 700111 HCHG RX REV CODE 636 W/ 250 OVERRIDE (IP): Performed by: NEUROLOGICAL SURGERY

## 2018-11-20 PROCEDURE — 99233 SBSQ HOSP IP/OBS HIGH 50: CPT | Mod: GC | Performed by: HOSPITALIST

## 2018-11-20 PROCEDURE — 770001 HCHG ROOM/CARE - MED/SURG/GYN PRIV*

## 2018-11-20 PROCEDURE — A9270 NON-COVERED ITEM OR SERVICE: HCPCS | Performed by: STUDENT IN AN ORGANIZED HEALTH CARE EDUCATION/TRAINING PROGRAM

## 2018-11-20 RX ADMIN — LEVETIRACETAM 500 MG: 500 TABLET ORAL at 06:33

## 2018-11-20 RX ADMIN — STANDARDIZED SENNA CONCENTRATE AND DOCUSATE SODIUM 2 TABLET: 8.6; 5 TABLET, FILM COATED ORAL at 06:33

## 2018-11-20 RX ADMIN — DEXAMETHASONE SODIUM PHOSPHATE 4 MG: 4 INJECTION, SOLUTION INTRA-ARTICULAR; INTRALESIONAL; INTRAMUSCULAR; INTRAVENOUS; SOFT TISSUE at 13:15

## 2018-11-20 RX ADMIN — DEXAMETHASONE SODIUM PHOSPHATE 4 MG: 4 INJECTION, SOLUTION INTRA-ARTICULAR; INTRALESIONAL; INTRAMUSCULAR; INTRAVENOUS; SOFT TISSUE at 22:03

## 2018-11-20 RX ADMIN — LEVETIRACETAM 500 MG: 500 TABLET ORAL at 16:22

## 2018-11-20 RX ADMIN — DEXAMETHASONE SODIUM PHOSPHATE 4 MG: 4 INJECTION, SOLUTION INTRA-ARTICULAR; INTRALESIONAL; INTRAMUSCULAR; INTRAVENOUS; SOFT TISSUE at 06:33

## 2018-11-20 ASSESSMENT — ENCOUNTER SYMPTOMS
SHORTNESS OF BREATH: 0
ABDOMINAL PAIN: 0
BLURRED VISION: 1
INSOMNIA: 0
SPUTUM PRODUCTION: 0
MYALGIAS: 0
DIZZINESS: 0
FOCAL WEAKNESS: 0
FEVER: 0
COUGH: 0
NAUSEA: 0
SPEECH CHANGE: 0
TREMORS: 0
CONSTIPATION: 0
BLOOD IN STOOL: 0
DEPRESSION: 0
FALLS: 0
DIARRHEA: 0
TINGLING: 0
PALPITATIONS: 0
PHOTOPHOBIA: 0
VOMITING: 0
WEIGHT LOSS: 0
SEIZURES: 0
NECK PAIN: 0
BRUISES/BLEEDS EASILY: 0
SENSORY CHANGE: 0
BACK PAIN: 0
LOSS OF CONSCIOUSNESS: 0
NERVOUS/ANXIOUS: 0
DOUBLE VISION: 0
SINUS PAIN: 0
CHILLS: 0
HEADACHES: 1
WEAKNESS: 0

## 2018-11-20 ASSESSMENT — PAIN SCALES - GENERAL: PAINLEVEL_OUTOF10: 0

## 2018-11-20 NOTE — PROGRESS NOTES
Neurosurgery Progress Note    Subjective:  Hospital day #6  brain and lung masses   Pt stable   Left arm weakness basically gone  Headaches much better   Brain MRI shows right frontal lesions with edema and smaller Left cerabellar lesion  Body CT shows bilateral lung lesions, left was biopsied Saturday, pending.  Testicular U/s Negative for lesions      Exam:  A&O x 3   Appropriate   Upper extremity motor is 5/5  No drift / EOM intact   CN 2-12 grossly intact     BP  Min: 117/69  Max: 134/92  Pulse  Av.3  Min: 48  Max: 78  Resp  Av.3  Min: 16  Max: 18  Temp  Av.7 °C (98.1 °F)  Min: 36.2 °C (97.1 °F)  Max: 37 °C (98.6 °F)  SpO2  Av %  Min: 91 %  Max: 97 %    No Data Recorded    Recent Labs      18   0247   WBC  9.6   RBC  5.45   HEMOGLOBIN  14.8   HEMATOCRIT  44.9   MCV  82.4   MCH  27.2   MCHC  33.0*   RDW  35.3*   PLATELETCT  325   MPV  8.6*     Recent Labs      18   0247   SODIUM  135   POTASSIUM  4.0   CHLORIDE  101   CO2  26   GLUCOSE  157*   BUN  30*   CREATININE  0.91   CALCIUM  9.5               Intake/Output       18 0700 - 18 0659 18 07 - 18 0659      [id number] Total [id number] Total       Intake    Total Intake -- -- -- -- -- --       Output    Stool  --  -- --  --  -- --    Number of Times Stooled 1 x -- 1 x -- -- --    Total Output -- -- -- -- -- --       Net I/O     -- -- -- -- -- --          No intake or output data in the 24 hours ending 18 0830         • dexamethasone  4 mg Q8HRS   • levETIRAcetam  500 mg BID   • Pharmacy Consult Request  1 Each PRN   • senna-docusate  2 Tab BID    And   • polyethylene glycol/lytes  1 Packet QDAY PRN    And   • magnesium hydroxide  30 mL QDAY PRN    And   • bisacodyl  10 mg QDAY PRN   • labetalol  10 mg Q4HRS PRN   • acetaminophen  650 mg Q6HRS PRN       Assessment and Plan:  Hospital day #6  Awaiting final path from lung biopsy   Brain biopsy only if needed   Wean steroids.  If no  definitive Dx made from bx, may require brain bx next week.

## 2018-11-20 NOTE — CONSULTS
Consult Note: Oncology    Date of consultation: 11/20/2018 2:26 PM    Referring provider: Dr Musa    Reason for consultation: Lung mass / brain lesion      History of presenting illness:   23-year-old white male who was brought into the hospital after the recommendation of his neurosurgeon.  He tells me that for probably a couple of months he was having headaches.  He then started having some numbness in his left arm.  He was seen by Dr. Manzano in clinic.  He was started to have weakness in that hand and some changes to his face and his headaches were getting worse.  He was advised to go the emergency room.  In the emergency room he had a CAT scan which did show a lesion in the right frontal about 2.8 cm in size with a lot of vasogenic edema.  There is no hemorrhage reported.  He has since had imaging done of CAT scans of chest abdomen pelvis.  This has revealed a mass in the left hilum over 7 cm in size which does involve the left upper and lower lobe.  He had no obvious mediastinal involvement.  However, it does go around the pulmonary artery on that side.  He also was found to have a 5 mm nodule in the right upper lung with some halo around it.  They felt that all this could be consistent with possible metastatic disease over primary tumor.  He had a normal alpha-fetoprotein and beta-HCG.  He had a normal testicular ultrasound.  He had a biopsy of the lung mass on 11/17/2018 with results pending at this time.    He tells me has no other real medical history.  He works.  Non-smoker nondrinker.  His girlfriend is here in the room.  He has siblings with no health problems.  He had a family member with lung cancer secondary to smoking.            Past Medical History:  History reviewed. No pertinent past medical history.    Past surgical history:    Past Surgical History:   Procedure Laterality Date   • OTHER ORTHOPEDIC SURGERY      foot repair       Allergies:  Patient has no known allergies.    Medications:     Current Facility-Administered Medications   Medication Dose Route Frequency Provider Last Rate Last Dose   • dexamethasone (DECADRON) injection 4 mg  4 mg Intravenous Q8HRS Stephen Manzano III, M.D.   4 mg at 11/20/18 1315   • levETIRAcetam (KEPPRA) tablet 500 mg  500 mg Oral BID Marcus Lazar M.D.   500 mg at 11/20/18 0633   • Pharmacy Consult Request ...Pain Management Review 1 Each  1 Each Other PRN Gurjit Herbert M.D.       • senna-docusate (PERICOLACE or SENOKOT S) 8.6-50 MG per tablet 2 Tab  2 Tab Oral BID Daysi Lopez M.D.   2 Tab at 11/20/18 0633    And   • polyethylene glycol/lytes (MIRALAX) PACKET 1 Packet  1 Packet Oral QDAY PRN Daysi Lopez M.D.        And   • magnesium hydroxide (MILK OF MAGNESIA) suspension 30 mL  30 mL Oral QDAY PRN Daysi Lopez M.D.        And   • bisacodyl (DULCOLAX) suppository 10 mg  10 mg Rectal QDAY PRN Daysi Lopez M.D.       • labetalol (NORMODYNE,TRANDATE) injection 10 mg  10 mg Intravenous Q4HRS PRN Daysi Lopez M.D.       • acetaminophen (TYLENOL) tablet 650 mg  650 mg Oral Q6HRS PRN Daysi Lopez M.D.   650 mg at 11/16/18 1609       Social History:     Social History     Social History   • Marital status: Single     Spouse name: N/A   • Number of children: N/A   • Years of education: N/A     Occupational History   • Not on file.     Social History Main Topics   • Smoking status: Never Smoker   • Smokeless tobacco: Never Used   • Alcohol use Yes      Comment: rare   • Drug use: No   • Sexual activity: Not on file     Other Topics Concern   • Not on file     Social History Narrative   • No narrative on file       Family History:     Family History   Problem Relation Age of Onset   • Heart Disease Mother    • Rheumatologic Disease Mother         aortitis       Review of Systems:  All other review of systems are negative except what was mentioned above in the HPI.    Constitutional: No fever, chills, weight loss ,malaise/fatigue.    HEENT:  "No new auditory or visual complaints. No sore throat and neck pain.     Respiratory:No new cough, sputum production, shortness of breath and wheezing.    Cardiovascular: No new chest pain, palpitations, orthopnea and leg swelling.    Gastrointestinal: No heartburn, nausea, vomiting ,abdominal pain, hematochezia or melena     Genitourinary: Negative for dysuria, hematuria    Musculoskeletal: No new arthralgias or myalgias   Skin: Negative for rash and itching.    Neurological: Negative for focal weakness or headaches.    Endo/Heme/Allergies: No abnormal bleed/bruise.    Psychiatric/Behavioral: No new depression/anxiety.    Physical Exam:  Vitals:   /82   Pulse (!) 48 Comment: RN notified  Temp 37 °C (98.6 °F) (Temporal)   Resp 17   Ht 1.803 m (5' 11\")   Wt 77 kg (169 lb 12.1 oz)   SpO2 97%   BMI 23.68 kg/m²     General: Not in acute distress, alert and oriented x 3  HEENT: No pallor, icterus. Oropharynx clear.   Neck: Supple, no palpable masses.  Lymph nodes: No palpable cervical, supraclavicular, axillary     CVS: regular rate and rhythm, no rubs or gallops  RESP: Clear to auscultate bilaterally, no wheezing or crackles.   ABD: Soft, non tender, non distended, positive bowel sounds, no palpable organomegaly  EXT: No edema or cyanosis  CNS: Alert and oriented x3, No focal deficits.  Skin- No rash/no lesions  Testicular exam deferred since he had one and had a testicular ultrasound      Labs:   Recent Labs      11/18/18   0247   RBC  5.45   HEMOGLOBIN  14.8   HEMATOCRIT  44.9   PLATELETCT  325     Lab Results   Component Value Date/Time    SODIUM 135 11/18/2018 02:47 AM    POTASSIUM 4.0 11/18/2018 02:47 AM    CHLORIDE 101 11/18/2018 02:47 AM    CO2 26 11/18/2018 02:47 AM    GLUCOSE 157 (H) 11/18/2018 02:47 AM    BUN 30 (H) 11/18/2018 02:47 AM    CREATININE 0.91 11/18/2018 02:47 AM        Assessment and Plan:     23-year-old white male ECOG performance status of 0 with a left lung mass over 7 cm in the " hilum involving upper and lower lobe, right upper lung nodule at 5 mm, and a 2.8 cm brain lesion in the right frontal area.  The patient himself feels asymptomatic now.  He is done well on steroids.  He has no complaints.    We talked in general today.  I told him obviously we want to wait on the pathology.  He knows that there is stains that are being done and pending.  I think with him clinically being stable we need to wait on pathology.  We did discuss some general differential diagnosis.  I told him I am hopeful with his age that we will find something extremely treatable.  We discussed that we could meet in the morning with his family and go over what ever final results we get from the initial staining.  He understands that sometimes pathology may have to send it out for further analysis.  We touched upon the fact that treatment could include chemotherapy depending on what we find.  He understands that sometimes surgery is involved as well.      I discussed with the primary team that I did see the patient.  I will be back in the morning to meet with the family and the patient with what ever results we have.  You can repeat his calcium levels, PTH level, and uric acid.    He agreed and verbalized his agreement and understanding with the current plan.  I answered all questions and concerns he has at this time.  Thank you for allowing me to participate in his care.      Please note that this dictation was created using voice recognition software. I have made every reasonable attempt to correct obvious errors, but I expect that there are errors of grammar and possibly content that I did not discover before finalizing the note.      SIGNATURES:  Dominick Castorena    CC:  KIAN Rod

## 2018-11-20 NOTE — PROGRESS NOTES
Internal Medicine Interval Note  Note Author: Marcus Lazar M.D.     Name Keanu Trivedi       1995   Age/Sex 23 y.o. male   MRN [id number]   Code Status Full     After 5PM or if no immediate response to page, please call for cross-coverage  Attending/Team: Dr Gray/ David See Patient List for primary contact information  Call [phone] to page    1st Call - Day Intern (R1):   Dr Lazar  2nd Call - Day Sr. Resident (R2/R3):   Dr Araujo         Reason for interval visit  (Principal Problem)   Brain masses   Lung masses  Headache    Interval Problem Daily Status Update  (24 hours, problem oriented, brief subjective history, new lab/imaging data pertinent to that problem)     - pt seen and examined at bedside this AM, stable and NAD; no AEON  - mild headache well controlled, no limb weakness or instability, ambulating well, no blurred vision; decadron 4 mg Q8H taper trial per NSx, Keppra to continue; NSx on board, appreciate recs  - Histo path: IHC indicates primary adenocarcinoma (abundant mucin, positive TTF-1, Napsin A); Oncology on board, will proceed per recs and based on patient's and family's wishes  - CA 15-5 above normal range, no radiological study indicated at present  - SPEP - raised gamma globulin, a/w hypercalcemia, no bony lesions on pan CT  - serum PTH, Ca, Uric acid in AM per Onc      Review of Systems   Constitutional: Negative for chills, fever and weight loss.   HENT: Negative for congestion, hearing loss, sinus pain and tinnitus.    Eyes: Positive for blurred vision. Negative for double vision and photophobia.   Respiratory: Negative for cough, sputum production and shortness of breath.    Cardiovascular: Negative for chest pain, palpitations and leg swelling.   Gastrointestinal: Negative for abdominal pain, blood in stool, constipation, diarrhea, nausea and vomiting.   Genitourinary: Negative for dysuria, frequency and urgency.   Musculoskeletal: Negative for back pain, falls,  joint pain, myalgias and neck pain.   Skin: Negative for itching and rash.   Neurological: Positive for headaches. Negative for dizziness, tingling, tremors, sensory change, speech change, focal weakness, seizures, loss of consciousness and weakness.   Endo/Heme/Allergies: Negative for environmental allergies. Does not bruise/bleed easily.   Psychiatric/Behavioral: Negative for depression and suicidal ideas. The patient is not nervous/anxious and does not have insomnia.        Disposition/Barriers to discharge:   Inpatient. Active medical w/u and management     Consultants/Specialty  NSGY- Dr Manzano   IR  Oncology  PCP: KIAN Rod      Quality Measures  Quality-Core Measures   Reviewed items::  Labs reviewed, Medications reviewed and Radiology images reviewed  Suarez catheter::  No Suarez  DVT prophylaxis pharmacological::  Not indicated at this time, ambulatory  DVT prophylaxis - mechanical:  SCDs  Ulcer Prophylaxis::  No          Physical Exam       Vitals:    11/19/18 2000 11/20/18 0400 11/20/18 0800 11/20/18 1600   BP: 134/92 131/76 132/82 136/84   Pulse: 78 60 (!) 48 70   Resp: 18 18 17 15   Temp: 36.8 °C (98.2 °F) 36.2 °C (97.1 °F) 37 °C (98.6 °F) 36.8 °C (98.2 °F)   TempSrc: Temporal Temporal Temporal Temporal   SpO2: 95% 93% 97% 96%   Weight:       Height:         Body mass index is 23.68 kg/m².    Oxygen Therapy:  Pulse Oximetry: 96 %, O2 (LPM): 0, O2 Delivery: None (Room Air)    Physical Exam   Constitutional: He is oriented to person, place, and time and well-developed, well-nourished, and in no distress. No distress.   HENT:   Head: Normocephalic and atraumatic.   Right Ear: External ear normal.   Left Ear: External ear normal.   Nose: Nose normal.   Mouth/Throat: No oropharyngeal exudate.   Unpigmented papule over left side of lower face  No suspicious lesions over scalp   Eyes: Conjunctivae and EOM are normal. Right eye exhibits no discharge. Left eye exhibits no discharge.   Neck: Normal  range of motion. Neck supple. No thyromegaly present.   Cardiovascular: Normal rate, regular rhythm and normal heart sounds.    No murmur heard.  Pulmonary/Chest: Effort normal and breath sounds normal. No respiratory distress. He has no rales. He exhibits no tenderness.   Abdominal: Soft. Bowel sounds are normal. He exhibits no distension and no mass. There is no tenderness.   Genitourinary: Penis normal. No discharge found.   Musculoskeletal: Normal range of motion. He exhibits no edema, tenderness or deformity.   Lymphadenopathy:     He has no cervical adenopathy.   Neurological: He is alert and oriented to person, place, and time. No cranial nerve deficit. He exhibits normal muscle tone. Gait normal. GCS score is 15.   Skin: Skin is warm and dry. No rash noted. He is not diaphoretic. No erythema. No pallor.   Multiple small pigmented nevi over back   Psychiatric: Mood, memory, affect and judgment normal.   Nursing note and vitals reviewed.            Assessment/Plan     * Lesion of right frontal lobe of brain- (present on admission)   Assessment & Plan    - A/w headache with neurological deficits. MR brain: 2.5 cm lesion a/w brain edema and 9 mm midline shift, another smaller brain lesion beside this one. Appears metastatic, high suspicion for germ cell tumor/ lumphoma. Doesn't appear to be of infectious etiology   - NSGY on board, pan CT revealed lung masses also possible mets  - Testicular U/S revealed normal dimensions of testes    Plan:   - decadron IV 4 mg q 8 hours taper per NSx  - Keppra 500 mg IVPB q12h  - q 4 hour neuro checks  - SCDs for DVT prophylaxis   - seizure/aspiration/fall precautions     Generalized headaches- (present on admission)   Assessment & Plan    - secondary to brain lesions  - no blurred vision  - symptomatically improved    Plan  - continue Decadron     Elevated tumor markers   Assessment & Plan    - elevated CA 15-3, breast cancer marker    Plan  - to correlate clinically and with  histopath report     Lung mass   Assessment & Plan    - bilateral masses in lungs, possible metastases  - no cough, weight loss, shortness of breath  - pt is a never smoker, but positive family hx of lung cancer in uncle and grandfather  - Histopathology indicates primary adenocarcinoma of lung    Plan  - Oncology to recommend further workup and management, will proceed as indicated and per patient's and family wishes     Cerebral edema (HCC)   Assessment & Plan    - secondary to brain mass, some midline shift  - normal vital signs, reduced headache, no blurred vision    Plan  - Continue decadron 4mg IV q8h     Midline shift of brain   Assessment & Plan    Continue decadron 4mg IV q8 taper trial  Continue Keppra 500 mg q12

## 2018-11-20 NOTE — PROGRESS NOTES
0700: Assumed care of patient. Resting in bed at this time. No apparent distress.    0820: CNA reported patient's morning heart rate at 48. Assessed patient. Resting soundly in bed. Easily roused. No apparent distress.    1200: Administered IV steroids. Patient resting in bed.    1700: Administered patient's steroids.    1900: Report given to night RN.

## 2018-11-20 NOTE — PROGRESS NOTES
Pt resting comfortably. A&O x 4. Pt ambulates independently. Denies pain and discomfort throughout shift. Fall precautions in place. No new complaints at this time.

## 2018-11-21 ENCOUNTER — PATIENT OUTREACH (OUTPATIENT)
Dept: HEALTH INFORMATION MANAGEMENT | Facility: OTHER | Age: 23
End: 2018-11-21

## 2018-11-21 ENCOUNTER — PATIENT OUTREACH (OUTPATIENT)
Dept: OTHER | Facility: MEDICAL CENTER | Age: 23
End: 2018-11-21

## 2018-11-21 VITALS
HEIGHT: 71 IN | DIASTOLIC BLOOD PRESSURE: 68 MMHG | RESPIRATION RATE: 18 BRPM | OXYGEN SATURATION: 96 % | SYSTOLIC BLOOD PRESSURE: 110 MMHG | WEIGHT: 169.75 LBS | HEART RATE: 52 BPM | TEMPERATURE: 98.6 F | BODY MASS INDEX: 23.77 KG/M2

## 2018-11-21 LAB
ALBUMIN SERPL BCP-MCNC: 3.8 G/DL (ref 3.2–4.9)
ALBUMIN/GLOB SERPL: 1.1 G/DL
ALP SERPL-CCNC: 68 U/L (ref 30–99)
ALT SERPL-CCNC: 28 U/L (ref 2–50)
ANION GAP SERPL CALC-SCNC: 8 MMOL/L (ref 0–11.9)
AST SERPL-CCNC: 11 U/L (ref 12–45)
BILIRUB SERPL-MCNC: 0.3 MG/DL (ref 0.1–1.5)
BUN SERPL-MCNC: 29 MG/DL (ref 8–22)
CA-I SERPL-SCNC: 1.2 MMOL/L (ref 1.1–1.3)
CALCIUM SERPL-MCNC: 9.2 MG/DL (ref 8.5–10.5)
CHLORIDE SERPL-SCNC: 101 MMOL/L (ref 96–112)
CO2 SERPL-SCNC: 28 MMOL/L (ref 20–33)
CREAT SERPL-MCNC: 1.08 MG/DL (ref 0.5–1.4)
ERYTHROCYTE [DISTWIDTH] IN BLOOD BY AUTOMATED COUNT: 34.7 FL (ref 35.9–50)
GLOBULIN SER CALC-MCNC: 3.6 G/DL (ref 1.9–3.5)
GLUCOSE SERPL-MCNC: 144 MG/DL (ref 65–99)
HCT VFR BLD AUTO: 46.9 % (ref 42–52)
HGB BLD-MCNC: 15.8 G/DL (ref 14–18)
MCH RBC QN AUTO: 27.3 PG (ref 27–33)
MCHC RBC AUTO-ENTMCNC: 33.7 G/DL (ref 33.7–35.3)
MCV RBC AUTO: 81.1 FL (ref 81.4–97.8)
PLATELET # BLD AUTO: 349 K/UL (ref 164–446)
PMV BLD AUTO: 9 FL (ref 9–12.9)
POTASSIUM SERPL-SCNC: 4.1 MMOL/L (ref 3.6–5.5)
PROT SERPL-MCNC: 7.4 G/DL (ref 6–8.2)
PTH-INTACT SERPL-MCNC: 50 PG/ML (ref 14–72)
RBC # BLD AUTO: 5.78 M/UL (ref 4.7–6.1)
SODIUM SERPL-SCNC: 137 MMOL/L (ref 135–145)
URATE SERPL-MCNC: 4.5 MG/DL (ref 2.5–8.3)
WBC # BLD AUTO: 12.4 K/UL (ref 4.8–10.8)

## 2018-11-21 PROCEDURE — 82330 ASSAY OF CALCIUM: CPT

## 2018-11-21 PROCEDURE — 36415 COLL VENOUS BLD VENIPUNCTURE: CPT

## 2018-11-21 PROCEDURE — 80053 COMPREHEN METABOLIC PANEL: CPT

## 2018-11-21 PROCEDURE — 83970 ASSAY OF PARATHORMONE: CPT

## 2018-11-21 PROCEDURE — 700102 HCHG RX REV CODE 250 W/ 637 OVERRIDE(OP): Performed by: STUDENT IN AN ORGANIZED HEALTH CARE EDUCATION/TRAINING PROGRAM

## 2018-11-21 PROCEDURE — 700111 HCHG RX REV CODE 636 W/ 250 OVERRIDE (IP): Performed by: NEUROLOGICAL SURGERY

## 2018-11-21 PROCEDURE — 99223 1ST HOSP IP/OBS HIGH 75: CPT | Performed by: RADIOLOGY

## 2018-11-21 PROCEDURE — 99239 HOSP IP/OBS DSCHRG MGMT >30: CPT | Mod: GC | Performed by: HOSPITALIST

## 2018-11-21 PROCEDURE — 85027 COMPLETE CBC AUTOMATED: CPT

## 2018-11-21 PROCEDURE — 84550 ASSAY OF BLOOD/URIC ACID: CPT

## 2018-11-21 PROCEDURE — A9270 NON-COVERED ITEM OR SERVICE: HCPCS | Performed by: STUDENT IN AN ORGANIZED HEALTH CARE EDUCATION/TRAINING PROGRAM

## 2018-11-21 RX ORDER — RANITIDINE 15 MG/ML
75 SOLUTION ORAL 2 TIMES DAILY
Status: DISCONTINUED | OUTPATIENT
Start: 2018-11-21 | End: 2018-11-21 | Stop reason: HOSPADM

## 2018-11-21 RX ORDER — DEXAMETHASONE SODIUM PHOSPHATE 4 MG/ML
2 INJECTION, SOLUTION INTRA-ARTICULAR; INTRALESIONAL; INTRAMUSCULAR; INTRAVENOUS; SOFT TISSUE EVERY 12 HOURS
Status: DISCONTINUED | OUTPATIENT
Start: 2018-11-21 | End: 2018-11-21

## 2018-11-21 RX ORDER — RANITIDINE 15 MG/ML
75 SOLUTION ORAL 2 TIMES DAILY
Qty: 100 ML | Refills: 1 | Status: ON HOLD | OUTPATIENT
Start: 2018-11-21 | End: 2018-11-30

## 2018-11-21 RX ORDER — LEVETIRACETAM 500 MG/1
500 TABLET ORAL 2 TIMES DAILY
Qty: 60 TAB | Refills: 1 | Status: ON HOLD | OUTPATIENT
Start: 2018-11-21 | End: 2018-12-02

## 2018-11-21 RX ORDER — DEXAMETHASONE 2 MG/1
2 TABLET ORAL 2 TIMES DAILY
Qty: 10 TAB | Refills: 2 | Status: ON HOLD | OUTPATIENT
Start: 2018-11-21 | End: 2018-11-30

## 2018-11-21 RX ORDER — DEXAMETHASONE 1 MG
2 TABLET ORAL 2 TIMES DAILY
Status: DISCONTINUED | OUTPATIENT
Start: 2018-11-21 | End: 2018-11-21 | Stop reason: HOSPADM

## 2018-11-21 RX ADMIN — DEXAMETHASONE SODIUM PHOSPHATE 4 MG: 4 INJECTION, SOLUTION INTRA-ARTICULAR; INTRALESIONAL; INTRAMUSCULAR; INTRAVENOUS; SOFT TISSUE at 06:21

## 2018-11-21 RX ADMIN — LEVETIRACETAM 500 MG: 500 TABLET ORAL at 06:21

## 2018-11-21 RX ADMIN — STANDARDIZED SENNA CONCENTRATE AND DOCUSATE SODIUM 2 TABLET: 8.6; 5 TABLET, FILM COATED ORAL at 06:21

## 2018-11-21 ASSESSMENT — ENCOUNTER SYMPTOMS
NECK PAIN: 0
PHOTOPHOBIA: 0
CONSTITUTIONAL NEGATIVE: 1
FALLS: 0
HEADACHES: 1
INSOMNIA: 0
SEIZURES: 0
NAUSEA: 0
COUGH: 0
MUSCULOSKELETAL NEGATIVE: 1
HEADACHES: 0
TREMORS: 0
PALPITATIONS: 0
CARDIOVASCULAR NEGATIVE: 1
WEAKNESS: 0
BACK PAIN: 0
SINUS PAIN: 0
SENSORY CHANGE: 0
GASTROINTESTINAL NEGATIVE: 1
ABDOMINAL PAIN: 0
PSYCHIATRIC NEGATIVE: 1
DIARRHEA: 0
DOUBLE VISION: 0
FEVER: 0
MYALGIAS: 0
DEPRESSION: 0
DIZZINESS: 0
EYES NEGATIVE: 1
LOSS OF CONSCIOUSNESS: 0
CHILLS: 0
FOCAL WEAKNESS: 0
VOMITING: 0
SPUTUM PRODUCTION: 0
BLOOD IN STOOL: 0
TINGLING: 0
SPEECH CHANGE: 0
NERVOUS/ANXIOUS: 0
WEIGHT LOSS: 0
BLURRED VISION: 1
RESPIRATORY NEGATIVE: 1
SHORTNESS OF BREATH: 0
BRUISES/BLEEDS EASILY: 0
CONSTIPATION: 0

## 2018-11-21 NOTE — PROGRESS NOTES
Referral received for cancer nurse navigation.  Met with patient on inpatient unit.  Girlfriend, Kira and mother, Bonnie present.  Introduced self and explained role of cancer nurse navigator.  He reports plan is for discharge today and discussion regarding surgery for brain mets and follow up with medical and radiation oncology.  He reports no questions at this time.  Feels physicians have explained information very well to him.  He did discuss that will also go to [facility] for additional review.  Provided contact information and discussed resources available within [facility] for cancer.  Pt gave verbal permission to speak with his mother (Bonnie), father (Garrett), sister (Bev), sister (Fe) and girlfriend(Kira).  Continue to follow.

## 2018-11-21 NOTE — PROGRESS NOTES
Oncology/Hematology Progress Note               Author: Dominick Castorena   Date & Time created: 2018  12:51 PM   CC:  Adenocarcinoma of lung origin    Interval History:  He did well overnight.  He has no new symptoms.  His family is at the bedside.    Review of Systems:  Review of Systems   Constitutional: Negative.    HENT: Negative.    Eyes: Negative.    Respiratory: Negative.    Cardiovascular: Negative.    Gastrointestinal: Negative.    Genitourinary: Negative.    Musculoskeletal: Negative.    Skin: Negative.    Neurological: Negative for focal weakness and headaches.   Endo/Heme/Allergies: Negative.    Psychiatric/Behavioral: Negative.        Physical Exam:  Physical Exam   Constitutional: He is oriented to person, place, and time.   HENT:   Head: Normocephalic and atraumatic.   Neurological: He is alert and oriented to person, place, and time.   Psychiatric: He has a normal mood and affect. His behavior is normal. Judgment and thought content normal.       Labs:          Recent Labs      18   014   SODIUM  137   POTASSIUM  4.1   CHLORIDE  101   CO2  28   BUN  29*   CREATININE  1.08   CALCIUM  9.2     Recent Labs      18   014   ALTSGPT  28   ASTSGOT  11*   ALKPHOSPHAT  68   TBILIRUBIN  0.3   GLUCOSE  144*     Recent Labs      18   014   RBC  5.78   HEMOGLOBIN  15.8   HEMATOCRIT  46.9   PLATELETCT  349     Recent Labs      18   WBC  12.4*   ASTSGOT  11*   ALTSGPT  28   ALKPHOSPHAT  68   TBILIRUBIN  0.3     Recent Labs      18   0148   SODIUM  137   POTASSIUM  4.1   CHLORIDE  101   CO2  28   GLUCOSE  144*   BUN  29*   CREATININE  1.08   CALCIUM  9.2     Hemodynamics:  Temp (24hrs), Av.7 °C (98 °F), Min:36.4 °C (97.6 °F), Max:37 °C (98.6 °F)  Temperature: 37 °C (98.6 °F)  Pulse  Av  Min: 48  Max: 100   Blood Pressure: 110/68     Respiratory:    Respiration: 18, Pulse Oximetry: 96 %        RUL Breath Sounds: Clear, RML Breath Sounds: Clear, RLL Breath Sounds:  Clear, KAVIN Breath Sounds: Clear, LLL Breath Sounds: Clear  Fluids:    Intake/Output Summary (Last 24 hours) at 11/21/18 1251  Last data filed at 11/20/18 1700   Gross per 24 hour   Intake              550 ml   Output                0 ml   Net              550 ml        GI/Nutrition:  Orders Placed This Encounter   Procedures   • Diet Order Regular     Standing Status:   Standing     Number of Occurrences:   1     Order Specific Question:   Diet:     Answer:   Regular [1]     Medical Decision Making, by Problem:  Active Hospital Problems    Diagnosis   • *Lesion of right frontal lobe of brain [G93.9]   • Generalized headaches [R51]   • Elevated tumor markers [R97.8]   • Midline shift of brain [G93.9]   • Cerebral edema (HCC) [G93.6]   • Lung mass [R91.8]     Past social history, past surgical history, past medical history unchanged and reviewed    Plan:    #1 oncology: 23-year-old non-smoker with adenocarcinoma that has a staining pattern of the primary lung cancer.  He obviously has an MRI showing concern for brain metastasis.  I had a long discussion with him and his family today.  They understand that this is obviously a very unusual situation at his age but the pathologist feel that it is a primary lung cancer.  He is overall stable and not symptomatic currently on steroids.    They understand that he would be considered stage IV disease.  We are hopeful that may be has oligo metastatic disease.  He will need a PET scan as an outpatient.  We discussed that I have sent his tumor block for molecular testing as well as foundation CDX testing.  They understand this is just additional molecular test.  They understand and have also discussed his case with lung cancer experts at [facility].  We discussed there is a lot of centers that they can consider since it was brought up today.  I told him they can research it on their own as well but I have worked a lot with Dr. Herman Mera at [facility].  This was given the  opportunity to participate in clinical trials that may come up if needed.  The patient and the family are very much in tune with getting as much information and potential options as possible.  The obviously want to get rid of the cancer but understand that may or may not happen.  We discussed putting him down the right path to see what opportunities come and what molecular testing shows.  They understand that if we do have some other targets that may lead to better responses.  If his PET/CT does not show any other evidence of disease except for the lung then obviously we try to be more aggressive with treatment.  They understand sometimes in patients with oligo metastatic disease we can have cases with long-term benefit.  We also discussed chemotherapy in general.  They understand that we do still use chemotherapy and that could be a part of his treatment plan depending on targets we find and if we truly have oligo metastatic disease.  We did discuss briefly immunotherapy as well.    We discussed also if possible removal of his brain metastasis for symptomatic control.  They will discuss this with Dr. Manzano.  I told him to ask him the questions they were asking me about on risk-benefit and if they should do the surgery here versus an academic center such as [facility].    We gave him copies of the path report.  I told him most likely this path would be reviewed where he ended up going from an academic center perspective.  I told him also resection of the brain metastasis gives us more additional pathology to work with and compare.  This also would document stage IV disease.      I spoke with Dr. Mone Davis who will come see the patient as well in consultation.  They will call my office next Tuesday so we can get him an outpatient appointment.  He will also let me know which academic center they would like to pursue.    High complexity/complex planning/discussion with other consultants/over 1 hour  Please note that this  dictation was created using voice recognition software. I have made every reasonable attempt to correct obvious errors, but I expect that there are errors of grammar and possibly context that I did not discover before finalizing the note  Quality-Core Measures   Reviewed items::  Medications reviewed, Labs reviewed and Radiology images reviewed

## 2018-11-21 NOTE — DISCHARGE SUMMARY
Internal Medicine Discharge Summary  Note Author: Marcus Lazar M.D.       Name Keanu Trivedi 1995   Age/Sex 23 y.o. male   MRN [id number]         Admit Date:  11/15/2018       Discharge Date:   18    Service:   [facility] Internal Medicine Yellow Team  Attending Physician(s):   Dr Gray       Senior Resident(s):   Dr Araujo  Iggy Resident(s):   Dr Lazar  PCP: YINKA RodPAMARILIS      Primary Diagnosis:   Primary adenocarcinoma of lung with kal metastases (satge 4 adenocarcinoma)    Secondary Diagnoses:                Principal Problem:    Lesion of right frontal lobe of brain POA: Yes  Active Problems:    Generalized headaches POA: Yes    Midline shift of brain POA: Yes    Cerebral edema (HCC) POA: Yes    Lung mass POA: Yes    Elevated tumor markers POA: Yes  Resolved Problems:    * No resolved hospital problems. *      Hospital Summary (Brief Narrative):       This pleasant 23 year old male patient with no significant past medical history and a lifelong non smoker presented with chief complaint of 2 months of generalized headaches, clumsiness of the right hand and muscle weakness. He was started on Keppra and decadron per Neurosurgery. He was found on imaging to have masses in his frontal lobe and cerebellum. Pan CT done also revealed 2 masses in his lungs one on each side. A search for a primary was undertaken and skin survey, testicular ultrasound and most tumor markers returned negative. Interventional radiology guided biopsy of the right lung mass revealed a primary adenocarcinoma of the lung with some squamous component. Oncology and Neurosurgery have been consulted for this patient and he is to undergo excisional surgery of his frontal lobe mass on 18 and at a later date stereotactic radiation as well.  At present he has been switched over to oral steroids and is medically clear for discharge home.      Patient /Hospital Summary (Details -- Problem Oriented) :          *  Lesion of right frontal lobe of brain   Assessment & Plan    - probable metastasis form primary adenocarcinoma of lung  - Neurosurgery on board  - symptomatic    Plan  - Prednisone   - Keppra   - excisional surgery on 11/27  - PET CT to stage       Generalized headaches   Assessment & Plan    - secondary to brain lesions  - no blurred vision  - symptomatically improved    Plan  - Decadron PO     Elevated tumor markers   Assessment & Plan    - elevated CA 15-3, breast cancer marker    Plan  - to correlate clinically and with histopath report     Lung mass   Assessment & Plan    - bilateral masses in lungs, possible metastases  - no cough, weight loss, shortness of breath  - pt is a never smoker, but positive family hx of lung cancer in uncle and grandfather  - Histopathology indicates primary adenocarcinoma of lung    Plan  - Oncology follow up after neurosurgical excision on 11/27, pt to be discharged today     Cerebral edema (HCC)   Assessment & Plan    - secondary to brain mass, some midline shift  - normal vital signs, reduced headache, no blurred vision    Plan  - Decadron PO     Midline shift of brain   Assessment & Plan    - Decadron 2 mg PO BID  - Zantac 75 mg         Consultants:     Neurosurgery - Dr Manzano  Oncology - Dr Castorena  Radiation Oncology - Dr Davis    Procedures:        none    Imaging/ Testing:      DX-CHEST-PORTABLE (1 VIEW)   Final Result      No pneumothorax identified following left lung mass biopsy      DX-CHEST-PORTABLE (1 VIEW)   Final Result      No pneumothorax identified following left lung mass biopsy      CT-NEEDLE BX-LUNG-MEDIASTINUM LEFT   Final Result      1.  CT-guided LEFT lower lobe lung mass biopsy   2.  BioSentry pleural plug deployed for pneumothorax prevention         [id number]   Final Result      Normal sonographic appearance of both testicle      CT-CHEST,ABDOMEN,PELVIS WITH   Final Result      1.  Left hemithorax parenchymal mass measuring 7.2 x 5.3 x 7.2 cm and  involving both the left upper and left lower lobes.      2.  Right upper lobe 5 mm nodule with surrounding halo      3.  Both are consistent with metastases      4.  No site of primary tumor identified. In this age group testicular tumor or melanoma should be considered as potential etiology      5.  Mild fatty infiltration of the liver      MR-BRAIN-WITH & W/O   Final Result         1.  3.1 cm multiloculated enhancing mass in the right posterior frontal centrum semiovale ovale with marked surrounding vasogenic edema causing a moderate amount of mass effect and midline shift. A second smaller homogeneously enhancing lesion is noted    in the left hemicerebellum superior laterally with a small amount of vasogenic edema. These lesions are suspicious for metastatic deposits.      CT-HEAD W/O   Final Result      2.5 x 1.6 x 1.7 cm heterogeneous lesion in the right frontal lobe with significant surrounding edema, mass effect and 9 mm midline shift to the left.   Differential considerations include an abscess, primary or secondary malignancy.      Findings discussed with Dr. Almazan.            Discharge Medications:         Medication Reconciliation: Completed       Medication List      START taking these medications      Instructions   dexamethasone 2 MG tablet  Commonly known as:  DECADRON   Take 1 Tab by mouth 2 Times a Day.  Dose:  2 mg     levETIRAcetam 500 MG Tabs  Commonly known as:  KEPPRA   Take 1 Tab by mouth 2 Times a Day.  Dose:  500 mg     raNITidine 15 mg/mL Syrp  Commonly known as:  ZANTAC   Take 5 mL by mouth 2 Times a Day.  Dose:  75 mg        CONTINUE taking these medications      Instructions   cyclobenzaprine 10 MG Tabs  Commonly known as:  FLEXERIL   Take 1 Tab by mouth at bedtime as needed.  Dose:  10 mg     meloxicam 15 MG tablet  Commonly known as:  MOBIC   Take 1 Tab by mouth every day.  Dose:  15 mg            Disposition:   To home    Diet:   As tolerated    Activity:   As  tolerated    Instructions:         The patient was instructed to return to the ER in the event of worsening symptoms. I have counseled the patient on the importance of compliance and the patient has agreed to proceed with all medical recommendations and follow up plan indicated above.   The patient understands that all medications come with benefits and risks. Risks may include permanent injury or death and these risks can be minimized with close reassessment and monitoring.        Primary Care Provider:    KIAN Rod    Discharge summary faxed to primary care provider:  Deferred  Copy of discharge summary given to the patient: Completed      Follow up appointment details :      With Neurosurgery  With Oncology    Pending Studies:        none    Time spent on discharge day patient visit, preparing discharge paperwork and arranging for patient follow up.    Summary of follow up issues:   Adenocarcinoma of lung, metastatic  Junior metastases    Discharge Time (Minutes) :    30 min  Hospital Course Type:  Inpatient Stay >2 midnights      Condition on Discharge    ______________________________________________________________________    Interval history/exam for day of discharge:      - pt seen and examined at bedside this AM, stable and NAD; no AEON  - headache well controlled, no weakness/ seizure activity, no blurred vision  - pt and family explained to in detail about diagnosis of primary adenocarcinoma with some squamous component  - pt to be discharged to day with neurosurgery  On 11/27/18 for excision of brain mass, steroid PO and Keppra PO with Xantac  - Oncology to follow after neurosurgery for chemotherapy, PET CT as outpatient  - medically clear for discharge to home         Body mass index is 23.68 kg/m².    Oxygen Therapy:  Pulse Oximetry: 96 %, O2 (LPM): 0, O2 Delivery: None (Room Air)     Physical Exam   Constitutional: He is oriented to person, place, and time and well-developed,  well-nourished, and in no distress. No distress.   HENT:   Head: Normocephalic and atraumatic.   Right Ear: External ear normal.   Left Ear: External ear normal.   Nose: Nose normal.   Mouth/Throat: No oropharyngeal exudate.   Unpigmented papule over left side of lower face  No suspicious lesions over scalp   Eyes: Conjunctivae and EOM are normal. Right eye exhibits no discharge. Left eye exhibits no discharge.   Neck: Normal range of motion. Neck supple. No thyromegaly present.   Cardiovascular: Normal rate, regular rhythm and normal heart sounds.    No murmur heard.  Pulmonary/Chest: Effort normal and breath sounds normal. No respiratory distress. He has no rales. He exhibits no tenderness.   Abdominal: Soft. Bowel sounds are normal. He exhibits no distension and no mass. There is no tenderness.   Genitourinary: Penis normal. No discharge found.   Musculoskeletal: Normal range of motion. He exhibits no edema, tenderness or deformity.   Lymphadenopathy:     He has no cervical adenopathy.   Neurological: He is alert and oriented to person, place, and time. No cranial nerve deficit. He exhibits normal muscle tone. Gait normal. GCS score is 15.   Skin: Skin is warm and dry. No rash noted. He is not diaphoretic. No erythema. No pallor.   Multiple small pigmented nevi over back   Psychiatric: Mood, memory, affect and judgment normal.   Nursing note and vitals reviewed.         Most Recent Labs:    Lab Results   Component Value Date/Time    WBC 12.4 (H) 11/21/2018 01:48 AM    RBC 5.78 11/21/2018 01:48 AM    HEMOGLOBIN 15.8 11/21/2018 01:48 AM    HEMATOCRIT 46.9 11/21/2018 01:48 AM    MCV 81.1 (L) 11/21/2018 01:48 AM    MCH 27.3 11/21/2018 01:48 AM    MCHC 33.7 11/21/2018 01:48 AM    MPV 9.0 11/21/2018 01:48 AM    NEUTSPOLYS 86.90 (H) 11/18/2018 02:47 AM    LYMPHOCYTES 8.30 (L) 11/18/2018 02:47 AM    MONOCYTES 4.50 11/18/2018 02:47 AM    EOSINOPHILS 0.00 11/18/2018 02:47 AM    BASOPHILS 0.00 11/18/2018 02:47 AM      Lab  Results   Component Value Date/Time    SODIUM 137 11/21/2018 01:48 AM    POTASSIUM 4.1 11/21/2018 01:48 AM    CHLORIDE 101 11/21/2018 01:48 AM    CO2 28 11/21/2018 01:48 AM    GLUCOSE 144 (H) 11/21/2018 01:48 AM    BUN 29 (H) 11/21/2018 01:48 AM    CREATININE 1.08 11/21/2018 01:48 AM      Lab Results   Component Value Date/Time    ALTSGPT 28 11/21/2018 01:48 AM    ASTSGOT 11 (L) 11/21/2018 01:48 AM    ALKPHOSPHAT 68 11/21/2018 01:48 AM    TBILIRUBIN 0.3 11/21/2018 01:48 AM    ALBUMIN 3.8 11/21/2018 01:48 AM    ALBUMIN 4.21 11/16/2018 10:00 AM    GLOBULIN 3.6 (H) 11/21/2018 01:48 AM    INR 1.10 11/15/2018 04:15 PM     Lab Results   Component Value Date/Time    PROTHROMBTM 14.3 11/15/2018 04:15 PM    INR 1.10 11/15/2018 04:15 PM

## 2018-11-21 NOTE — DISCHARGE INSTRUCTIONS
Discharge Instructions    Discharged to home by car with relative. Discharged via wheelchair, hospital escort: Refused.  Special equipment needed: Not Applicable    Be sure to schedule a follow-up appointment with your primary care doctor or any specialists as instructed.     Discharge Plan:   Influenza Vaccine Indication: Patient Refuses    I understand that a diet low in cholesterol, fat, and sodium is recommended for good health. Unless I have been given specific instructions below for another diet, I accept this instruction as my diet prescription.   Other diet: Regular    Special Instructions: None    · Is patient discharged on Warfarin / Coumadin?   No     Depression / Suicide Risk    As you are discharged from this [facility] facility, it is important to learn how to keep safe from harming yourself.    Recognize the warning signs:  · Abrupt changes in personality, positive or negative- including increase in energy   · Giving away possessions  · Change in eating patterns- significant weight changes-  positive or negative  · Change in sleeping patterns- unable to sleep or sleeping all the time   · Unwillingness or inability to communicate  · Depression  · Unusual sadness, discouragement and loneliness  · Talk of wanting to die  · Neglect of personal appearance   · Rebelliousness- reckless behavior  · Withdrawal from people/activities they love  · Confusion- inability to concentrate     If you or a loved one observes any of these behaviors or has concerns about self-harm, here's what you can do:  · Talk about it- your feelings and reasons for harming yourself  · Remove any means that you might use to hurt yourself (examples: pills, rope, extension cords, firearm)  · Get professional help from the community (Mental Health, Substance Abuse, psychological counseling)  · Do not be alone:Call your Safe Contact- someone whom you trust who will be there for you.  · Call your local CRISIS HOTLINE 945-5334 or  104.529.6019  · Call your local Children's Mobile Crisis Response Team [place] (480) 906-8976 or www."[employer]"  · Call the toll free National Suicide Prevention Hotlines   · National Suicide Prevention Lifeline 737-978-RKBV (4986)  · [employer] Hope Line Network 800-SUICIDE (245-5901)        Metastatic Brain Tumor  A metastatic brain tumor is a growth in your brain. It is made of cancer cells from another part of your body that traveled to your brain through your bloodstream, along the nerves of your brain (cranial nerves), or through the opening at the base of your skull.   CAUSES   The most common cause of a metastatic brain tumor in men is lung cancer. In women, it is breast cancer. Other common causes include:  · Unknown types of cancers.  · Skin cancer (melanoma).  · Colon cancer.  · Kidney cancer.  SIGNS AND SYMPTOMS   Most signs and symptoms of metastatic brain tumors are caused by increased pressure inside your brain. A headache is often the first symptom. Eventually, almost everyone with a metastatic brain tumor has a headache. Other signs and symptoms include:  · Vomiting.  · Weakness or fatigue.  · Seizures.  · Sensory problems, like tingling or numbness.  · Problems walking.  · Clumsiness.  · Emotional changes.  · Personality changes.  · Changes in speech or vision.  DIAGNOSIS   Your health care provider can diagnose a metastatic brain tumor from your medical history and physical exam. You may also have some additional tests, including:  · A nervous system function test (neurologic exam).  · Imaging studies of your brain, such as an MRI and CT scan.  · Having a small piece of tissue removed (biopsy) from your brain to be checked under a microscope.  TREATMENT   Treatment depends on the type of cancer you have and your overall health. It also depends on the size of your tumor and the number of brain tumors you have. The most common treatments include:  · Medicines to control pain, nausea, and  seizures.  · Cancer-killing drugs (chemotherapy).  · An X-ray treatment called radiation therapy to kill brain tumor cells.  · A type of radiation therapy that is targeted to exact locations in the brain (stereotactic radiotherapy).  · Surgery to remove brain tumors.  HOME CARE INSTRUCTIONS  · Only take medicines as directed by your health care provider.  · Do not drive if:  ¨ You are taking strong pain medicines.  ¨ Your vision or thinking is not clear.  · Take two 10-minute walks every day if you are able. Exercising is a good way to relieve stress. It can also help you sleep.  · Be sure to get enough calories and protein in your diet.  ¨ If you have a poor appetite or feel nauseous, eat small meals often.  ¨ Supplement your diet with protein shakes or milk shakes.  · It is common to have anxiety and depression when you are coping with cancer.  ¨ Talk to friends and loved ones about your feelings.  ¨ Have a good support system at home.  SEEK MEDICAL CARE IF:  · You have chills or fever.  · Your medicine is not controlling your symptoms.  · Your symptoms get worse or you develop new symptoms.  · You are having trouble caring for yourself or being cared for at home.  · You are struggling with anxiety or depression.  SEEK IMMEDIATE MEDICAL CARE IF:  · You cannot stop vomiting.  · You cannot keep down any foods or fluids.  · You have sudden changes in speech or vision.  · You have a seizure.  · You can no longer take care of yourself at home.  This information is not intended to replace advice given to you by your health care provider. Make sure you discuss any questions you have with your health care provider.  Document Released: 09/13/2005 Document Revised: 01/08/2016 Document Reviewed: 12/09/2014  Elsevier Interactive Patient Education © 2017 [employer]      Lung Cancer  Lung cancer occurs when abnormal cells in the lung grow out of control and form a mass (tumor). There are several types of lung cancer. The two  most common types are:  · Non-small cell. In this type of lung cancer, abnormal cells are larger and grow more slowly than those of small cell lung cancer.  · Small cell. In this type of lung cancer, abnormal cells are smaller than those of non-small cell lung cancer. Small cell lung cancer gets worse faster than non-small cell lung cancer.  What are the causes?  The leading cause of lung cancer is smoking tobacco. The second leading cause is radon exposure.  What increases the risk?  · Smoking tobacco.  · Exposure to secondhand tobacco smoke.  · Exposure to radon gas.  · Exposure to asbestos.  · Exposure to arsenic in drinking water.  · Air pollution.  · Family or personal history of lung cancer.  · Lung radiation therapy.  · Being older than 65 years.  What are the signs or symptoms?  In the early stages, symptoms may not be present. As the cancer progresses, symptoms may include:  · A lasting cough, possibly with blood.  · Fatigue.  · Unexplained weight loss.  · Shortness of breath.  · Wheezing.  · Chest pain.  · Loss of appetite.  Symptoms of advanced lung cancer include:  · Hoarseness.  · Bone or joint pain.  · Weakness.  · Nail problems.  · Face or arm swelling.  · Paralysis of the face.  · Drooping eyelids.  How is this diagnosed?  Lung cancer can be identified with a physical exam and with tests such as:  · A chest X-ray.  · A CT scan.  · Blood tests.  · A biopsy.  After a diagnosis is made, you will have more tests to determine the stage of the cancer. The stages of non-small cell lung cancer are:  · Stage 0, also called carcinoma in situ. At this stage, abnormal cells are found in the inner lining of your lung or lungs.  · Stage I. At this stage, abnormal cells have grown into a tumor that is no larger than 5 cm across. The cancer has entered the deeper lung tissue but has not yet entered the lymph nodes or other parts of the body.  · Stage II. At this stage, the tumor is 7 cm across or smaller and has  entered nearby lymph nodes. Or, the tumor is 5 cm across or smaller and has invaded surrounding tissue but is not found in nearby lymph nodes. There may be more than one tumor present.  · Stage III. At this stage, the tumor may be any size. There may be more than one tumor in the lungs. The cancer cells have spread to the lymph nodes and possibly to other organs.  · Stage IV. At this stage, there are tumors in both lungs and the cancer has spread to other areas of the body.  The stages of small cell lung cancer are:  · Limited. At this stage, the cancer is found only on one side of the chest.  · Extensive. At this stage, the cancer is in the lungs and in tissues on the other side of the chest. The cancer has spread to other organs or is found in the fluid between the layers of your lungs.  How is this treated?  Depending on the type and stage of your lung cancer, you may be treated with:  · Surgery. This is done to remove a tumor.  · Radiation therapy. This treatment destroys cancer cells using X-rays or other types of radiation.  · Chemotherapy. This treatment uses medicines to destroy cancer cells.  · Targeted therapy. This treatment aims to destroy only cancer cells instead of all cells as other therapies do.  You may also have a combination of treatments.  Follow these instructions at home:  · Do not use any tobacco products. This includes cigarettes, chewing tobacco, and electronic cigarettes. If you need help quitting, ask your health care provider.  · Take medicines only as directed by your health care provider.  · Eat a healthy diet. Work with a dietitian to make sure you are getting the nutrition you need.  · Consider joining a support group or seeking counseling to help you cope with the stress of having lung cancer.  · Let your cancer specialist (oncologist) know if you are admitted to the hospital.  · Keep all follow-up visits as directed by your health care provider. This is important.  Contact a health  care provider if:  · You lose weight without trying.  · You have a persistent cough and wheezing.  · You feel short of breath.  · You tire easily.  · You experience bone or joint pain.  · You have difficulty swallowing.  · You feel hoarse or notice your voice changing.  · Your pain medicine is not helping.  Get help right away if:  · You cough up blood.  · You have new breathing problems.  · You develop chest pain.  · You develop swelling in:  ¨ One or both ankles or legs.  ¨ Your face, neck, or arms.  · You are confused.  · You experience paralysis in your face or a drooping eyelid.  This information is not intended to replace advice given to you by your health care provider. Make sure you discuss any questions you have with your health care provider.  Document Released: 03/26/2002 Document Revised: 05/25/2017 Document Reviewed: 04/23/2015  [employer] Interactive Patient Education © 2017 [employer]        Healthy Eating to Prevent Digestive Disorders  The digestive system starts at the mouth and goes all the way down to the rectum. Along the way, your digestive system breaks down the food you eat so you can absorb its nutrients and use them for energy.  Digestive disorders can cause gas, bloating, pain, heartburn, and other symptoms. They can prevent your digestive system from doing its job. Healthy eating and a healthy lifestyle can help you avoid many common digestive disorders.  What nutrition changes can be made?  Start by eating a balanced diet. Eat healthy foods from all the major food groups. These include carbohydrates, fats, and proteins. Other changes you can make include to:  · Eat enough fiber. Fiber is a healthy carbohydrate that cleans out your digestive system. Fiber absorbs water and helps you have regular bowel movements. Fiber comes from plants. To get enough fiber in your diet, eat 4-5 servings of fruits, vegetables, and legumes every day. Include beans and whole grains. Most people should get  20?35 grams of fiber each day.  · Drink enough water to keep your urine clear or pale yellow. Water helps your body digest food. It can also help prevent constipation.  · Avoid fatty proteins. Full-fat dairy products and fatty meats are hard to digest. Fats you want to avoid are those that get solid at room temperature (saturated fats). Instead of eating these kinds of fats, eat plant-based unsaturated fats found in olives, canola, corn, avocado, and nuts.  · If you have trouble with gas, belching, or flatulence, avoid gas-producing foods. These include beans, carbonated beverages, cabbage, cauliflower, and broccoli. If you are lactose intolerant, avoid dairy products or choose lactose-free dairy products.  · If you have frequent heartburn, stay away from alcohol, caffeine, fatty foods, chocolate, and peppermint. Avoid lying down within two hours of eating a full meal. Overeating and lying down too soon after a meal can cause heartburn.  · Add probiotics to your diet. Healthy digestion depends on having the right balance of good bacteria in your colon. Probiotics can help restore the balance of good bacteria in your digestive system. Probiotics are live active cultures that are found in yogurt, kefir, and cultured foods like sauerkraut and miso. You can also add good bacteria with probiotic supplements.  · Make sure to chew your food slowly and completely.  · Instead of eating three large meals each day, eat three small meals with three small snacks.  What other changes can I make?  You can help your digestive system stay healthy by making these lifestyle changes:  · Stay active and exercise every day.  · Maintain a healthy weight.  · Eat on a regular schedule.  · Avoid tight-fitting clothes. They can restrict digestion.  · If you have frequent heartburn, raise the head of your bed 2-3 inches (5-7.5 cm).  · Do not use any tobacco products, such as cigarettes, chewing tobacco, and e-cigarettes. If you need help  quitting, ask your health care provider.  · Limit alcohol intake to no more than 1 drink a day for nonpregnant women and 2 drinks a day for men. One drink equals 12 oz of beer, 5 oz of wine, or 1½ oz of hard liquor.  · Avoid stress. Find ways to reduce stress, such as meditation, exercise, or taking time for activities that relax you.  Why should I make these changes?  Making these changes will help your digestive system function at its best. A healthy digestive system can help you avoid or improve your management of digestive disorders such as:  · Bloating, gas, and flatulence.  · Heartburn.  · Gastroesophageal reflux disease (GERD).  · Peptic ulcer disease.  · Hemorrhoids.  · Diverticulitis.  · Constipation.  · Diarrhea.  · Gall stones  · Irritable bowel syndrome.  · Malnutrition.  · Fatty liver disease.  What can happen if changes are not made?  Not making these changes could put you at risk for many conditions caused by a poor diet or an unhealthy weight, such as heart disease, stroke and diabetes.  Where can I get more information?  Learn more about healthy eating and digestive disorders by visiting these websites:  · Academy of Nutrition and Dietetics: http://www.eatright.org/resources/health/wellness/digestive-health  · Centers for Disease Control and Prevention: https://health.gov/dietaryguidelines/2015/  · U.S. Department of Health and Human Services: https://www.womenshealth.gov/files/assets/docs/the-healthy-woman/digestive_health.pdf  Summary  · A heathy diet can help prevent many digestive disorders.  · Eat a balanced diet consisting of fiber, unsaturated fats, lean protein, fruits, and vegetables.  · Eat three small meals with three small snacks per day.  · Drink plenty of water every day.  · Get plenty of exercise and maintain a healthy weight.  This information is not intended to replace advice given to you by your health care provider. Make sure you discuss any questions you have with your health  care provider.  Document Released: 01/13/2017 Document Revised: 05/25/2017 Document Reviewed: 08/30/2017  Elsevier Interactive Patient Education © 2017 [employer] Inc.      We are all rooting for you here on the unit! Stay strong!

## 2018-11-21 NOTE — PROGRESS NOTES
0700: Assumed care of patient. Resting in bed at this time. No complaints or apparent distress.    0900: Met with patient,pt family, and Dr. Castorena. Plan of care established and discharge plan discussed. Provided all requested lab work and reports.Offered support and ensure privacy was provided. Approximately 45 minutes spent at bedside.    1000: Dr. Davis at bedside. Patient's parents called to bedside.    1100: [facility] team at bedside with nurse. Discharge plan discussed.                                    Current Barriers to Discharge: Switch to PO steroids.

## 2018-11-21 NOTE — PROGRESS NOTES
Family wishes to proceed with stealth guided tumor resection next week. Recommend home for holidays on decadron 2mg BID, GI prophylaxis such as zantac keppra 500mg BID, will work on scheduling time, discussed at length with patient and family.

## 2018-11-21 NOTE — PROGRESS NOTES
Internal Medicine Interval Note  Note Author: Marcus Lazar M.D.     Name Keanu Trivedi       1995   Age/Sex 23 y.o. male   MRN [id number]   Code Status Full     After 5PM or if no immediate response to page, please call for cross-coverage  Attending/Team: Dr Gray/ David See Patient List for primary contact information  Call [phone] to page    1st Call - Day Intern (R1):   Dr Lazar  2nd Call - Day Sr. Resident (R2/R3):   Dr Araujo         Reason for interval visit  (Principal Problem)   Brain metastasis  Lung primary adenocarcinoma      Interval Problem Daily Status Update  (24 hours, problem oriented, brief subjective history, new lab/imaging data pertinent to that problem)     - pt seen and examined at bedside this AM, stable and NAD; no AEON  - headache well controlled, no weakness/ seizure activity, no blurred vision  - pt and family explained to in detail about diagnosis of primary adenocarcinoma with some squamous component  - pt to be discharged to day with neurosurgery  On 18 for excision of brain mass, steroid PO and Keppra PO with Xantac  - Oncology to follow after neurosurgery for chemotherapy, PET CT as outpatient  - medically clear for discharge to home    Review of Systems   Constitutional: Negative for chills, fever and weight loss.   HENT: Negative for congestion, hearing loss, sinus pain and tinnitus.    Eyes: Positive for blurred vision. Negative for double vision and photophobia.   Respiratory: Negative for cough, sputum production and shortness of breath.    Cardiovascular: Negative for chest pain, palpitations and leg swelling.   Gastrointestinal: Negative for abdominal pain, blood in stool, constipation, diarrhea, nausea and vomiting.   Genitourinary: Negative for dysuria, frequency and urgency.   Musculoskeletal: Negative for back pain, falls, joint pain, myalgias and neck pain.   Skin: Negative for itching and rash.   Neurological: Positive for headaches. Negative  for dizziness, tingling, tremors, sensory change, speech change, focal weakness, seizures, loss of consciousness and weakness.   Endo/Heme/Allergies: Negative for environmental allergies. Does not bruise/bleed easily.   Psychiatric/Behavioral: Negative for depression and suicidal ideas. The patient is not nervous/anxious and does not have insomnia.        Disposition/Barriers to discharge:   To home    Consultants/Specialty  NSGY- Dr Manzano   IR  Oncology - Dr Castorena  PCP: KIAN Rod      Quality Measures  Quality-Core Measures   Reviewed items::  Labs reviewed, Medications reviewed and Radiology images reviewed  Suarez catheter::  No Suarez  DVT prophylaxis pharmacological::  Not indicated at this time, ambulatory  DVT prophylaxis - mechanical:  SCDs  Ulcer Prophylaxis::  No          Physical Exam       Vitals:    11/20/18 1600 11/20/18 2000 11/21/18 0400 11/21/18 0800   BP: 136/84 124/78 127/73 110/68   Pulse: 70 72 82 (!) 52   Resp: 15 18 18 18   Temp: 36.8 °C (98.2 °F) 36.4 °C (97.6 °F) 36.5 °C (97.7 °F) 37 °C (98.6 °F)   TempSrc: Temporal Temporal Temporal Temporal   SpO2: 96% 93% 98% 96%   Weight:       Height:         Body mass index is 23.68 kg/m².    Oxygen Therapy:  Pulse Oximetry: 96 %, O2 (LPM): 0, O2 Delivery: None (Room Air)    Physical Exam   Constitutional: He is oriented to person, place, and time and well-developed, well-nourished, and in no distress. No distress.   HENT:   Head: Normocephalic and atraumatic.   Right Ear: External ear normal.   Left Ear: External ear normal.   Nose: Nose normal.   Mouth/Throat: No oropharyngeal exudate.   Unpigmented papule over left side of lower face  No suspicious lesions over scalp   Eyes: Conjunctivae and EOM are normal. Right eye exhibits no discharge. Left eye exhibits no discharge.   Neck: Normal range of motion. Neck supple. No thyromegaly present.   Cardiovascular: Normal rate, regular rhythm and normal heart sounds.    No murmur  heard.  Pulmonary/Chest: Effort normal and breath sounds normal. No respiratory distress. He has no rales. He exhibits no tenderness.   Abdominal: Soft. Bowel sounds are normal. He exhibits no distension and no mass. There is no tenderness.   Genitourinary: Penis normal. No discharge found.   Musculoskeletal: Normal range of motion. He exhibits no edema, tenderness or deformity.   Lymphadenopathy:     He has no cervical adenopathy.   Neurological: He is alert and oriented to person, place, and time. No cranial nerve deficit. He exhibits normal muscle tone. Gait normal. GCS score is 15.   Skin: Skin is warm and dry. No rash noted. He is not diaphoretic. No erythema. No pallor.   Multiple small pigmented nevi over back   Psychiatric: Mood, memory, affect and judgment normal.   Nursing note and vitals reviewed.            Assessment/Plan     * Lesion of right frontal lobe of brain- (present on admission)   Assessment & Plan    - probable metastasis form primary adenocarcinoma of lung  - Neurosurgery on board  - symptomatic    Plan  - Prednisone   - Keppra   - excisional surgery on 11/27  - PET CT to stage       Generalized headaches- (present on admission)   Assessment & Plan    - secondary to brain lesions  - no blurred vision  - symptomatically improved    Plan  - Decadron PO     Elevated tumor markers   Assessment & Plan    - elevated CA 15-3, breast cancer marker    Plan  - to correlate clinically and with histopath report     Lung mass   Assessment & Plan    - bilateral masses in lungs, possible metastases  - no cough, weight loss, shortness of breath  - pt is a never smoker, but positive family hx of lung cancer in uncle and grandfather  - Histopathology indicates primary adenocarcinoma of lung    Plan  - Oncology follow up after neurosurgical excision on 11/27, pt to be discharged today     Cerebral edema (HCC)   Assessment & Plan    - secondary to brain mass, some midline shift  - normal vital signs, reduced  headache, no blurred vision    Plan  - Decadron PO     Midline shift of brain   Assessment & Plan    - Decadron 2 mg PO BID  - Zantac 75 mg

## 2018-11-21 NOTE — CONSULTS
RADIATION ONCOLOGY CONSULT    DATE OF SERVICE: 11/21/2018    IDENTIFICATION: A 23 y.o. 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  He is here at the kind request of Dr. Castorena.      HISTORY OF PRESENT ILLNESS: Patient's history dates back to about 2 weeks ago when he developed weakness in the left upper extremity with incoordination of movement and numbness.  He does describe over the last 1-2 months intermittent headaches which she has not had before.  He ultimately was seen in the emergency room on 11/15/2018.  CT scan of the head was done followed by an MRI scan of the head revealing a 3.1 cm multiloculated enhancing mass in the right posterior frontal centrum semi-ovale with marked surrounding vasogenic edema causing a moderate amount of mass-effect and midline shift.  There was also a second smaller homogeneously and ileum with a small amount of vasogenic edema.  These lesions are both suspicious for metastatic deposits.  CT scan of the chest abdomen and pelvis 11/16/2018 showed a mass in the left hemithorax measuring 7.2 x 5.2 x 7.2 involving both the left upper and left lower lobes.  There was also a 5 mm right upper lobe nodule with a surrounding halo.  Both are consistent with metastatic disease no primary tumor is identified.  There is mild fatty infiltration of the liver.  Ultrasound of the scrotum was also performed 11/16/2018 and this was normal.  Biopsy of the lung was obtained on 11/17/2018 and this revealed adenocarcinoma consistent with a lung primary..  Studies are being sent out for immune markers.  He is seen today to discuss radiation therapy in his overall treatment plan.    PAST MEDICAL HISTORY:   History reviewed. No pertinent past medical history.    PAST SURGICAL HISTORY:  Past Surgical History:   Procedure Laterality Date   • OTHER ORTHOPEDIC SURGERY      foot repair         CURRENT MEDICATIONS:  Current Facility-Administered Medications   Medication Dose Route  Frequency Provider Last Rate Last Dose   • dexamethasone (DECADRON) injection 4 mg  4 mg Intravenous Q8HRS Stephen Manzano III, M.D.   4 mg at 11/21/18 0621   • levETIRAcetam (KEPPRA) tablet 500 mg  500 mg Oral BID Marcus Lazar M.D.   500 mg at 11/21/18 0621   • Pharmacy Consult Request ...Pain Management Review 1 Each  1 Each Other PRN Gurjit Herbert M.D.       • senna-docusate (PERICOLACE or SENOKOT S) 8.6-50 MG per tablet 2 Tab  2 Tab Oral BID Daysi Lopez M.D.   2 Tab at 11/21/18 0621    And   • polyethylene glycol/lytes (MIRALAX) PACKET 1 Packet  1 Packet Oral QDAY PRN Daysi Lopez M.D.        And   • magnesium hydroxide (MILK OF MAGNESIA) suspension 30 mL  30 mL Oral QDAY PRN Daysi Lopez M.D.        And   • bisacodyl (DULCOLAX) suppository 10 mg  10 mg Rectal QDAY PRN Daysi Lopez M.D.       • labetalol (NORMODYNE,TRANDATE) injection 10 mg  10 mg Intravenous Q4HRS PRN Daysi Lopez M.D.       • acetaminophen (TYLENOL) tablet 650 mg  650 mg Oral Q6HRS PRN Daysi Lopez M.D.   650 mg at 11/16/18 1609       ALLERGIES:    Patient has no known allergies.    FAMILY HISTORY:    Family History   Problem Relation Age of Onset   • Heart Disease Mother    • Rheumatologic Disease Mother         aortitis   Positive for lung cancer in his maternal grandfather but he was a heavy smoker.  Otherwise no cancer in the family.        SOCIAL HISTORY:     reports that he has never smoked. He has never used smokeless tobacco. He reports that he drinks alcohol. He reports that he does not use drugs.      Review of Systems:   Constitutional: Denies fever, chills, sweats, or weight loss  HENT: Presented with headaches but no longer present now.  Denies neck pain, dizziness, hearing loss  Eyes: Denies vision changes  Respiratory: Denies cough, congestion, SOB, hemoptysis  Cardiovascular: Denies palpations, chest pain or easy fatiguability   Gastrointestinal: Denies diarrhea, abdominal pain or  constipation.  No blood in stool.  Genitourinal: Denies hematuria, dysuria, incontinence  Musculoskeletal: Denies back pain or joint pain   Skin: Denies itching or rash, or new lesions  Neurological: Denies numbness or tingling, weakness, siezures, but presented with left upper extremity weakness and numbness which is resolved since on Decadron  Endocrine: Denies thyroid problems or diabetes  Psyche: Denies depression, anxiety, mood changes    PHYSICAL EXAM:    1= Restricted in physically strenuous activity, but ambulatory and able to carry out work of a light sedentary nature, e.g., light housework, office work.  Vitals:    11/20/18 1600 11/20/18 2000 11/21/18 0400 11/21/18 0800   BP: 136/84 124/78 127/73 110/68   Pulse: 70 72 82 (!) 52   Resp: 15 18 18 18   Temp: 36.8 °C (98.2 °F) 36.4 °C (97.6 °F) 36.5 °C (97.7 °F) 37 °C (98.6 °F)   TempSrc: Temporal Temporal Temporal Temporal   SpO2: 96% 93% 98% 96%   Weight:       Height:       Location: Head  Description: Aching        GENERAL: Well-appearing alert and oriented x3 in no apparent distress  HEENT:  Pupils are equal, round, and reactive to light.  Extraocular muscles   are intact. Sclerae nonicteric.  Conjunctivae pink.  Oral cavity, tongue   protrudes midline.   NECK:  Supple without evidence of thyromegaly.  NODES:  No peripheral adenopathy of the neck, supraclavicular fossa or axillae   bilaterally.  LUNGS:  Clear to ascultation   HEART:  Regular rate and rhythm.  No murmur appreciated  ABDOMEN:  Soft. No evidence of hepatosplenomegaly.  Positive bowel sounds.  EXTREMITIES:  Without Edema.  NEUROLOGIC:  Cranial nerves II through XII were intact.  Motor and sensory 5 out of 5 stance and gait not tested as patient is in bed                IMPRESSION:    A 23 y.o. with presumed metastatic adenocarcinoma of the lung to the brain.      RECOMMENDATIONS:   I had a long discussion with the patient and his girlfriend and parents.  We discussed resection of the brain  lesion that was causing the left upper extremity weakness followed by stereotactic radiosurgery to the tumor bed and to the other metastatic lesion.  We discussed the fact that after he has a surgical resection about 2 weeks later we get a stereotactic radiosurgery MRI scan with minor cuts.  We may at that point see further brain metastasis.  At that point we can treat those with stereotactic radiosurgery.  The understand that we are still waiting for tumor markers on the lung and Dr. Castorena will be directing systemic management.  We also discussed that at some point he may receive radiation therapy to the lung lesion as well.  They understand that we have to do the PET CT scan to determine if there is any other metastatic disease.  If not he would fall into the  oligometastatic disease category.  I've described the details of radiation along with the side effects both acute and chronic, including but not exclusive to fatigue, skin reaction, local soreness, swelling, delayed healing, scarring fibrosis discoloration. Ample time was allowed for questions, and patient understands.    They understand that I will be in touch with the patient and arrange for the stereotactic radiosurgery MRI scan and radiation set up once the surgery has been done.    Thank you for the opportunity to participate in his care.  If any questions or comments, please do not hesitate in calling.    Please note that this dictation was created using voice recognition software. I have made every reasonable attempt to correct obvious errors, but I expect that there are errors of grammar and possibly content that I did not discover before finalizing the note.

## 2018-11-29 ENCOUNTER — HOSPITAL ENCOUNTER (OUTPATIENT)
Dept: RADIOLOGY | Facility: MEDICAL CENTER | Age: 23
End: 2018-11-29
Attending: INTERNAL MEDICINE
Payer: COMMERCIAL

## 2018-11-29 DIAGNOSIS — C34.92 SQUAMOUS CARCINOMA OF LUNG, LEFT (HCC): ICD-10-CM

## 2018-11-29 PROCEDURE — A9552 F18 FDG: HCPCS

## 2018-11-30 ENCOUNTER — APPOINTMENT (OUTPATIENT)
Dept: RADIOLOGY | Facility: MEDICAL CENTER | Age: 23
DRG: 026 | End: 2018-11-30
Attending: NEUROLOGICAL SURGERY
Payer: COMMERCIAL

## 2018-11-30 ENCOUNTER — HOSPITAL ENCOUNTER (INPATIENT)
Facility: MEDICAL CENTER | Age: 23
LOS: 2 days | DRG: 026 | End: 2018-12-02
Attending: NEUROLOGICAL SURGERY | Admitting: NEUROLOGICAL SURGERY
Payer: COMMERCIAL

## 2018-11-30 DIAGNOSIS — G93.6 CEREBRAL EDEMA (HCC): ICD-10-CM

## 2018-11-30 DIAGNOSIS — R51.9 GENERALIZED HEADACHES: ICD-10-CM

## 2018-11-30 LAB
ABO GROUP BLD: NORMAL
ABO GROUP BLD: NORMAL
BLD GP AB SCN SERPL QL: NORMAL
EKG IMPRESSION: NORMAL
INR PPP: 0.92 (ref 0.87–1.13)
PROTHROMBIN TIME: 12.4 SEC (ref 12–14.6)
RH BLD: NORMAL
RH BLD: NORMAL

## 2018-11-30 PROCEDURE — C1713 ANCHOR/SCREW BN/BN,TIS/BN: HCPCS | Performed by: NEUROLOGICAL SURGERY

## 2018-11-30 PROCEDURE — 4A1004G MONITORING OF CENTRAL NERVOUS ELECTRICAL ACTIVITY, INTRAOPERATIVE, OPEN APPROACH: ICD-10-PCS | Performed by: NEUROLOGICAL SURGERY

## 2018-11-30 PROCEDURE — 36415 COLL VENOUS BLD VENIPUNCTURE: CPT

## 2018-11-30 PROCEDURE — 95961 ELECTRODE STIMULATION BRAIN: CPT | Performed by: NEUROLOGICAL SURGERY

## 2018-11-30 PROCEDURE — 88307 TISSUE EXAM BY PATHOLOGIST: CPT

## 2018-11-30 PROCEDURE — 00B00ZZ EXCISION OF BRAIN, OPEN APPROACH: ICD-10-PCS | Performed by: NEUROLOGICAL SURGERY

## 2018-11-30 PROCEDURE — 95925 SOMATOSENSORY TESTING: CPT | Performed by: NEUROLOGICAL SURGERY

## 2018-11-30 PROCEDURE — 700111 HCHG RX REV CODE 636 W/ 250 OVERRIDE (IP): Performed by: NEUROLOGICAL SURGERY

## 2018-11-30 PROCEDURE — 160002 HCHG RECOVERY MINUTES (STAT): Performed by: NEUROLOGICAL SURGERY

## 2018-11-30 PROCEDURE — 86900 BLOOD TYPING SEROLOGIC ABO: CPT

## 2018-11-30 PROCEDURE — 160042 HCHG SURGERY MINUTES - EA ADDL 1 MIN LEVEL 5: Performed by: NEUROLOGICAL SURGERY

## 2018-11-30 PROCEDURE — 160036 HCHG PACU - EA ADDL 30 MINS PHASE I: Performed by: NEUROLOGICAL SURGERY

## 2018-11-30 PROCEDURE — 85610 PROTHROMBIN TIME: CPT

## 2018-11-30 PROCEDURE — A4314 CATH W/DRAINAGE 2-WAY LATEX: HCPCS | Performed by: NEUROLOGICAL SURGERY

## 2018-11-30 PROCEDURE — 700111 HCHG RX REV CODE 636 W/ 250 OVERRIDE (IP): Performed by: ANESTHESIOLOGY

## 2018-11-30 PROCEDURE — 86901 BLOOD TYPING SEROLOGIC RH(D): CPT

## 2018-11-30 PROCEDURE — 70553 MRI BRAIN STEM W/O & W/DYE: CPT

## 2018-11-30 PROCEDURE — 95860 NEEDLE EMG 1 EXTREMITY: CPT | Performed by: NEUROLOGICAL SURGERY

## 2018-11-30 PROCEDURE — 500444 HCHG HEMOSTAT, SURGICEL 2X3: Performed by: NEUROLOGICAL SURGERY

## 2018-11-30 PROCEDURE — 95937 NEUROMUSCULAR JUNCTION TEST: CPT | Performed by: NEUROLOGICAL SURGERY

## 2018-11-30 PROCEDURE — 93010 ELECTROCARDIOGRAM REPORT: CPT | Performed by: INTERNAL MEDICINE

## 2018-11-30 PROCEDURE — 160035 HCHG PACU - 1ST 60 MINS PHASE I: Performed by: NEUROLOGICAL SURGERY

## 2018-11-30 PROCEDURE — 501445 HCHG STAPLER, SKIN DISP: Performed by: NEUROLOGICAL SURGERY

## 2018-11-30 PROCEDURE — C9248 INJ, CLEVIDIPINE BUTYRATE: HCPCS | Performed by: NEUROLOGICAL SURGERY

## 2018-11-30 PROCEDURE — 700102 HCHG RX REV CODE 250 W/ 637 OVERRIDE(OP): Performed by: ANESTHESIOLOGY

## 2018-11-30 PROCEDURE — 500075 HCHG BLADE, CLIPPER NEURO: Performed by: NEUROLOGICAL SURGERY

## 2018-11-30 PROCEDURE — 86850 RBC ANTIBODY SCREEN: CPT

## 2018-11-30 PROCEDURE — 700102 HCHG RX REV CODE 250 W/ 637 OVERRIDE(OP)

## 2018-11-30 PROCEDURE — A9270 NON-COVERED ITEM OR SERVICE: HCPCS

## 2018-11-30 PROCEDURE — 500331 HCHG COTTONOID, SURG PATTIE: Performed by: NEUROLOGICAL SURGERY

## 2018-11-30 PROCEDURE — 501838 HCHG SUTURE GENERAL: Performed by: NEUROLOGICAL SURGERY

## 2018-11-30 PROCEDURE — 500889 HCHG PACK, NEURO: Performed by: NEUROLOGICAL SURGERY

## 2018-11-30 PROCEDURE — 770022 HCHG ROOM/CARE - ICU (200)

## 2018-11-30 PROCEDURE — 95940 IONM IN OPERATNG ROOM 15 MIN: CPT | Performed by: NEUROLOGICAL SURGERY

## 2018-11-30 PROCEDURE — 93005 ELECTROCARDIOGRAM TRACING: CPT | Performed by: NEUROLOGICAL SURGERY

## 2018-11-30 PROCEDURE — C9248 INJ, CLEVIDIPINE BUTYRATE: HCPCS

## 2018-11-30 PROCEDURE — 00H032Z INSERTION OF MONITORING DEVICE INTO BRAIN, PERCUTANEOUS APPROACH: ICD-10-PCS | Performed by: NEUROLOGICAL SURGERY

## 2018-11-30 PROCEDURE — 700101 HCHG RX REV CODE 250

## 2018-11-30 PROCEDURE — 700111 HCHG RX REV CODE 636 W/ 250 OVERRIDE (IP)

## 2018-11-30 PROCEDURE — 110454 HCHG SHELL REV 250: Performed by: NEUROLOGICAL SURGERY

## 2018-11-30 PROCEDURE — 160009 HCHG ANES TIME/MIN: Performed by: NEUROLOGICAL SURGERY

## 2018-11-30 PROCEDURE — 4A103BD MONITORING OF INTRACRANIAL PRESSURE, PERCUTANEOUS APPROACH: ICD-10-PCS | Performed by: NEUROLOGICAL SURGERY

## 2018-11-30 PROCEDURE — A9270 NON-COVERED ITEM OR SERVICE: HCPCS | Performed by: ANESTHESIOLOGY

## 2018-11-30 PROCEDURE — 500440 HCHG DRESSING, STERILE ROLL (KERLIX): Performed by: NEUROLOGICAL SURGERY

## 2018-11-30 PROCEDURE — 160031 HCHG SURGERY MINUTES - 1ST 30 MINS LEVEL 5: Performed by: NEUROLOGICAL SURGERY

## 2018-11-30 PROCEDURE — 160048 HCHG OR STATISTICAL LEVEL 1-5: Performed by: NEUROLOGICAL SURGERY

## 2018-11-30 PROCEDURE — 95867 NDL EMG CRANIAL NRV MUSC UNI: CPT | Performed by: NEUROLOGICAL SURGERY

## 2018-11-30 DEVICE — PLATE NC RECTANGLE 2X2 (6NCX4=24): Type: IMPLANTABLE DEVICE | Site: GROIN | Status: FUNCTIONAL

## 2018-11-30 DEVICE — PLATE NC BURR HOLE COVER FOR SHUNT 14MM (6NCX6=36): Type: IMPLANTABLE DEVICE | Site: GROIN | Status: FUNCTIONAL

## 2018-11-30 DEVICE — SCREW STRYK NC 1.5X5MM (6NCX40=240) (80EA/PK) CONSIGNED QTY 240 PRE-LOAD: Type: IMPLANTABLE DEVICE | Site: GROIN | Status: FUNCTIONAL

## 2018-11-30 DEVICE — GRAFT DURAMATRIX ONLAY PLUS 1IN X 3IN OR 2.75CM X 7.5CM: Type: IMPLANTABLE DEVICE | Site: GROIN | Status: FUNCTIONAL

## 2018-11-30 RX ORDER — SODIUM CHLORIDE 9 MG/ML
INJECTION, SOLUTION INTRAVENOUS ONCE
Status: CANCELLED | OUTPATIENT
Start: 2018-11-30 | End: 2018-11-30

## 2018-11-30 RX ORDER — OXYCODONE HCL 5 MG/5 ML
5 SOLUTION, ORAL ORAL
Status: COMPLETED | OUTPATIENT
Start: 2018-11-30 | End: 2018-11-30

## 2018-11-30 RX ORDER — BUPIVACAINE HYDROCHLORIDE AND EPINEPHRINE 5; 5 MG/ML; UG/ML
INJECTION, SOLUTION EPIDURAL; INTRACAUDAL; PERINEURAL
Status: DISCONTINUED | OUTPATIENT
Start: 2018-11-30 | End: 2018-11-30 | Stop reason: HOSPADM

## 2018-11-30 RX ORDER — GADOBUTROL 604.72 MG/ML
7.5 INJECTION INTRAVENOUS ONCE
Status: COMPLETED | OUTPATIENT
Start: 2018-11-30 | End: 2018-11-30

## 2018-11-30 RX ORDER — BACITRACIN 50000 [IU]/1
INJECTION, POWDER, FOR SOLUTION INTRAMUSCULAR
Status: DISCONTINUED | OUTPATIENT
Start: 2018-11-30 | End: 2018-11-30 | Stop reason: HOSPADM

## 2018-11-30 RX ORDER — HYDROMORPHONE HYDROCHLORIDE 1 MG/ML
0.2 INJECTION, SOLUTION INTRAMUSCULAR; INTRAVENOUS; SUBCUTANEOUS
Status: DISCONTINUED | OUTPATIENT
Start: 2018-11-30 | End: 2018-11-30 | Stop reason: HOSPADM

## 2018-11-30 RX ORDER — OXYCODONE HYDROCHLORIDE 5 MG/1
5 TABLET ORAL
Status: DISCONTINUED | OUTPATIENT
Start: 2018-11-30 | End: 2018-11-30 | Stop reason: HOSPADM

## 2018-11-30 RX ORDER — HYDRALAZINE HYDROCHLORIDE 20 MG/ML
5 INJECTION INTRAMUSCULAR; INTRAVENOUS
Status: DISCONTINUED | OUTPATIENT
Start: 2018-11-30 | End: 2018-11-30 | Stop reason: HOSPADM

## 2018-11-30 RX ORDER — RANITIDINE 15 MG/ML
75 SOLUTION ORAL 2 TIMES DAILY
COMMUNITY
Start: 2018-11-22 | End: 2019-11-27

## 2018-11-30 RX ORDER — SODIUM CHLORIDE 9 MG/ML
INJECTION, SOLUTION INTRAVENOUS CONTINUOUS
Status: DISCONTINUED | OUTPATIENT
Start: 2018-11-30 | End: 2018-11-30 | Stop reason: HOSPADM

## 2018-11-30 RX ORDER — HYDROMORPHONE HYDROCHLORIDE 1 MG/ML
0.1 INJECTION, SOLUTION INTRAMUSCULAR; INTRAVENOUS; SUBCUTANEOUS
Status: DISCONTINUED | OUTPATIENT
Start: 2018-11-30 | End: 2018-11-30 | Stop reason: HOSPADM

## 2018-11-30 RX ORDER — MEPERIDINE HYDROCHLORIDE 25 MG/ML
12.5 INJECTION INTRAMUSCULAR; INTRAVENOUS; SUBCUTANEOUS
Status: DISCONTINUED | OUTPATIENT
Start: 2018-11-30 | End: 2018-11-30 | Stop reason: HOSPADM

## 2018-11-30 RX ORDER — OXYCODONE HCL 5 MG/5 ML
10 SOLUTION, ORAL ORAL
Status: COMPLETED | OUTPATIENT
Start: 2018-11-30 | End: 2018-11-30

## 2018-11-30 RX ORDER — SODIUM CHLORIDE 9 MG/ML
INJECTION, SOLUTION INTRAVENOUS ONCE
Status: COMPLETED | OUTPATIENT
Start: 2018-11-30 | End: 2018-11-30

## 2018-11-30 RX ORDER — MELOXICAM 15 MG/1
15 TABLET ORAL PRN
COMMUNITY
End: 2019-11-27

## 2018-11-30 RX ORDER — ONDANSETRON 2 MG/ML
4 INJECTION INTRAMUSCULAR; INTRAVENOUS EVERY 6 HOURS PRN
Status: DISCONTINUED | OUTPATIENT
Start: 2018-11-30 | End: 2018-12-01

## 2018-11-30 RX ORDER — OXYCODONE HYDROCHLORIDE 5 MG/1
10 TABLET ORAL
Status: DISCONTINUED | OUTPATIENT
Start: 2018-11-30 | End: 2018-11-30 | Stop reason: HOSPADM

## 2018-11-30 RX ORDER — HALOPERIDOL 5 MG/ML
1 INJECTION INTRAMUSCULAR
Status: DISCONTINUED | OUTPATIENT
Start: 2018-11-30 | End: 2018-11-30 | Stop reason: HOSPADM

## 2018-11-30 RX ORDER — HYDROMORPHONE HYDROCHLORIDE 1 MG/ML
0.4 INJECTION, SOLUTION INTRAMUSCULAR; INTRAVENOUS; SUBCUTANEOUS
Status: DISCONTINUED | OUTPATIENT
Start: 2018-11-30 | End: 2018-11-30 | Stop reason: HOSPADM

## 2018-11-30 RX ORDER — DEXAMETHASONE 2 MG/1
2 TABLET ORAL 2 TIMES DAILY
Status: ON HOLD | COMMUNITY
Start: 2018-11-22 | End: 2018-12-02

## 2018-11-30 RX ORDER — METOPROLOL TARTRATE 1 MG/ML
1 INJECTION, SOLUTION INTRAVENOUS
Status: DISCONTINUED | OUTPATIENT
Start: 2018-11-30 | End: 2018-11-30 | Stop reason: HOSPADM

## 2018-11-30 RX ORDER — ONDANSETRON 2 MG/ML
4 INJECTION INTRAMUSCULAR; INTRAVENOUS
Status: DISCONTINUED | OUTPATIENT
Start: 2018-11-30 | End: 2018-11-30 | Stop reason: HOSPADM

## 2018-11-30 RX ADMIN — CLEVIDIPINE 4 MG/HR: 0.5 EMULSION INTRAVENOUS at 22:24

## 2018-11-30 RX ADMIN — GADOBUTROL 7.5 ML: 604.72 INJECTION INTRAVENOUS at 15:16

## 2018-11-30 RX ADMIN — CLEVIDIPINE 4 MG/HR: 0.5 EMULSION INTRAVENOUS at 21:18

## 2018-11-30 RX ADMIN — SODIUM CHLORIDE: 9 INJECTION, SOLUTION INTRAVENOUS at 14:31

## 2018-11-30 RX ADMIN — OXYCODONE HYDROCHLORIDE 10 MG: 5 SOLUTION ORAL at 21:40

## 2018-11-30 RX ADMIN — FENTANYL CITRATE 25 MCG: 50 INJECTION, SOLUTION INTRAMUSCULAR; INTRAVENOUS at 21:38

## 2018-11-30 RX ADMIN — CLEVIDIPINE 3 MG/HR: 0.5 EMULSION INTRAVENOUS at 23:57

## 2018-11-30 RX ADMIN — FENTANYL CITRATE 25 MCG: 50 INJECTION, SOLUTION INTRAMUSCULAR; INTRAVENOUS at 21:45

## 2018-11-30 ASSESSMENT — PAIN SCALES - GENERAL
PAINLEVEL_OUTOF10: 3
PAINLEVEL_OUTOF10: 6
PAINLEVEL_OUTOF10: 3

## 2018-11-30 NOTE — PROGRESS NOTES
Med rec updated and complete  Allergies reviewed  Interviewed pt with family at bedside with permission from pt.  Pt reports no vitamins.  Pt reports no antibiotics in the last 30 days, that he had to take at home.

## 2018-12-01 ENCOUNTER — APPOINTMENT (OUTPATIENT)
Dept: RADIOLOGY | Facility: MEDICAL CENTER | Age: 23
DRG: 026 | End: 2018-12-01
Attending: NEUROLOGICAL SURGERY
Payer: COMMERCIAL

## 2018-12-01 LAB
ALBUMIN SERPL BCP-MCNC: 3.5 G/DL (ref 3.2–4.9)
ALBUMIN/GLOB SERPL: 1.3 G/DL
ALP SERPL-CCNC: 54 U/L (ref 30–99)
ALT SERPL-CCNC: 85 U/L (ref 2–50)
ANION GAP SERPL CALC-SCNC: 11 MMOL/L (ref 0–11.9)
AST SERPL-CCNC: 26 U/L (ref 12–45)
BASOPHILS # BLD AUTO: 0.2 % (ref 0–1.8)
BASOPHILS # BLD: 0.02 K/UL (ref 0–0.12)
BILIRUB SERPL-MCNC: 0.7 MG/DL (ref 0.1–1.5)
BUN SERPL-MCNC: 18 MG/DL (ref 8–22)
CA-I SERPL-SCNC: 1.1 MMOL/L (ref 1.1–1.3)
CALCIUM SERPL-MCNC: 8.8 MG/DL (ref 8.5–10.5)
CHLORIDE SERPL-SCNC: 107 MMOL/L (ref 96–112)
CO2 SERPL-SCNC: 19 MMOL/L (ref 20–33)
CREAT SERPL-MCNC: 0.77 MG/DL (ref 0.5–1.4)
EOSINOPHIL # BLD AUTO: 0 K/UL (ref 0–0.51)
EOSINOPHIL NFR BLD: 0 % (ref 0–6.9)
ERYTHROCYTE [DISTWIDTH] IN BLOOD BY AUTOMATED COUNT: 37.7 FL (ref 35.9–50)
GLOBULIN SER CALC-MCNC: 2.8 G/DL (ref 1.9–3.5)
GLUCOSE SERPL-MCNC: 131 MG/DL (ref 65–99)
HCT VFR BLD AUTO: 42.6 % (ref 42–52)
HGB BLD-MCNC: 14 G/DL (ref 14–18)
IMM GRANULOCYTES # BLD AUTO: 0.1 K/UL (ref 0–0.11)
IMM GRANULOCYTES NFR BLD AUTO: 0.9 % (ref 0–0.9)
LYMPHOCYTES # BLD AUTO: 0.54 K/UL (ref 1–4.8)
LYMPHOCYTES NFR BLD: 4.9 % (ref 22–41)
MCH RBC QN AUTO: 27.1 PG (ref 27–33)
MCHC RBC AUTO-ENTMCNC: 32.9 G/DL (ref 33.7–35.3)
MCV RBC AUTO: 82.4 FL (ref 81.4–97.8)
MONOCYTES # BLD AUTO: 0.38 K/UL (ref 0–0.85)
MONOCYTES NFR BLD AUTO: 3.5 % (ref 0–13.4)
NEUTROPHILS # BLD AUTO: 9.94 K/UL (ref 1.82–7.42)
NEUTROPHILS NFR BLD: 90.5 % (ref 44–72)
NRBC # BLD AUTO: 0 K/UL
NRBC BLD-RTO: 0 /100 WBC
PATHOLOGY CONSULT NOTE: NORMAL
PLATELET # BLD AUTO: 189 K/UL (ref 164–446)
PMV BLD AUTO: 8.3 FL (ref 9–12.9)
POTASSIUM SERPL-SCNC: 4.2 MMOL/L (ref 3.6–5.5)
PROT SERPL-MCNC: 6.3 G/DL (ref 6–8.2)
RBC # BLD AUTO: 5.17 M/UL (ref 4.7–6.1)
SODIUM SERPL-SCNC: 137 MMOL/L (ref 135–145)
WBC # BLD AUTO: 11 K/UL (ref 4.8–10.8)

## 2018-12-01 PROCEDURE — 700111 HCHG RX REV CODE 636 W/ 250 OVERRIDE (IP): Performed by: INTERNAL MEDICINE

## 2018-12-01 PROCEDURE — 70450 CT HEAD/BRAIN W/O DYE: CPT

## 2018-12-01 PROCEDURE — 700102 HCHG RX REV CODE 250 W/ 637 OVERRIDE(OP): Performed by: PHYSICIAN ASSISTANT

## 2018-12-01 PROCEDURE — 85025 COMPLETE CBC W/AUTO DIFF WBC: CPT

## 2018-12-01 PROCEDURE — A9270 NON-COVERED ITEM OR SERVICE: HCPCS | Performed by: INTERNAL MEDICINE

## 2018-12-01 PROCEDURE — 82330 ASSAY OF CALCIUM: CPT

## 2018-12-01 PROCEDURE — 700111 HCHG RX REV CODE 636 W/ 250 OVERRIDE (IP): Performed by: PHYSICIAN ASSISTANT

## 2018-12-01 PROCEDURE — A9270 NON-COVERED ITEM OR SERVICE: HCPCS | Performed by: PHYSICIAN ASSISTANT

## 2018-12-01 PROCEDURE — 700105 HCHG RX REV CODE 258: Performed by: PHYSICIAN ASSISTANT

## 2018-12-01 PROCEDURE — 770001 HCHG ROOM/CARE - MED/SURG/GYN PRIV*

## 2018-12-01 PROCEDURE — 700102 HCHG RX REV CODE 250 W/ 637 OVERRIDE(OP): Performed by: NEUROLOGICAL SURGERY

## 2018-12-01 PROCEDURE — A9270 NON-COVERED ITEM OR SERVICE: HCPCS | Performed by: NEUROLOGICAL SURGERY

## 2018-12-01 PROCEDURE — 700102 HCHG RX REV CODE 250 W/ 637 OVERRIDE(OP): Performed by: INTERNAL MEDICINE

## 2018-12-01 PROCEDURE — 700112 HCHG RX REV CODE 229: Performed by: PHYSICIAN ASSISTANT

## 2018-12-01 PROCEDURE — 80053 COMPREHEN METABOLIC PANEL: CPT

## 2018-12-01 RX ORDER — DIPHENHYDRAMINE HCL 25 MG
25 TABLET ORAL EVERY 6 HOURS PRN
Status: DISCONTINUED | OUTPATIENT
Start: 2018-12-01 | End: 2018-12-02 | Stop reason: HOSPADM

## 2018-12-01 RX ORDER — FAMOTIDINE 20 MG/1
20 TABLET, FILM COATED ORAL 2 TIMES DAILY
Status: DISCONTINUED | OUTPATIENT
Start: 2018-12-01 | End: 2018-12-02 | Stop reason: HOSPADM

## 2018-12-01 RX ORDER — DIPHENHYDRAMINE HYDROCHLORIDE 50 MG/ML
25 INJECTION INTRAMUSCULAR; INTRAVENOUS EVERY 6 HOURS PRN
Status: DISCONTINUED | OUTPATIENT
Start: 2018-12-01 | End: 2018-12-02 | Stop reason: HOSPADM

## 2018-12-01 RX ORDER — HYDROMORPHONE HYDROCHLORIDE 2 MG/ML
0.5 INJECTION, SOLUTION INTRAMUSCULAR; INTRAVENOUS; SUBCUTANEOUS
Status: DISCONTINUED | OUTPATIENT
Start: 2018-12-01 | End: 2018-12-01

## 2018-12-01 RX ORDER — OXYCODONE HYDROCHLORIDE 5 MG/1
5 TABLET ORAL
Status: DISCONTINUED | OUTPATIENT
Start: 2018-12-01 | End: 2018-12-01

## 2018-12-01 RX ORDER — AMOXICILLIN 250 MG
1 CAPSULE ORAL NIGHTLY
Status: DISCONTINUED | OUTPATIENT
Start: 2018-12-01 | End: 2018-12-02 | Stop reason: HOSPADM

## 2018-12-01 RX ORDER — HYDROMORPHONE HYDROCHLORIDE 1 MG/ML
0.5 INJECTION, SOLUTION INTRAMUSCULAR; INTRAVENOUS; SUBCUTANEOUS
Status: DISCONTINUED | OUTPATIENT
Start: 2018-12-01 | End: 2018-12-02 | Stop reason: HOSPADM

## 2018-12-01 RX ORDER — ACETAMINOPHEN 325 MG/1
650 TABLET ORAL EVERY 6 HOURS
Status: DISCONTINUED | OUTPATIENT
Start: 2018-12-01 | End: 2018-12-02 | Stop reason: HOSPADM

## 2018-12-01 RX ORDER — HYDRALAZINE HYDROCHLORIDE 20 MG/ML
10 INJECTION INTRAMUSCULAR; INTRAVENOUS
Status: DISCONTINUED | OUTPATIENT
Start: 2018-12-01 | End: 2018-12-02 | Stop reason: HOSPADM

## 2018-12-01 RX ORDER — DOCUSATE SODIUM 100 MG/1
100 CAPSULE, LIQUID FILLED ORAL 2 TIMES DAILY
Status: DISCONTINUED | OUTPATIENT
Start: 2018-12-01 | End: 2018-12-02 | Stop reason: HOSPADM

## 2018-12-01 RX ORDER — DEXAMETHASONE 4 MG/1
4 TABLET ORAL EVERY 8 HOURS
Status: DISCONTINUED | OUTPATIENT
Start: 2018-12-01 | End: 2018-12-02

## 2018-12-01 RX ORDER — OXYCODONE HYDROCHLORIDE 10 MG/1
10 TABLET ORAL
Status: DISCONTINUED | OUTPATIENT
Start: 2018-12-01 | End: 2018-12-02 | Stop reason: HOSPADM

## 2018-12-01 RX ORDER — ENEMA 19; 7 G/133ML; G/133ML
1 ENEMA RECTAL
Status: DISCONTINUED | OUTPATIENT
Start: 2018-12-01 | End: 2018-12-02 | Stop reason: HOSPADM

## 2018-12-01 RX ORDER — ONDANSETRON 2 MG/ML
4 INJECTION INTRAMUSCULAR; INTRAVENOUS EVERY 4 HOURS PRN
Status: DISCONTINUED | OUTPATIENT
Start: 2018-12-01 | End: 2018-12-02 | Stop reason: HOSPADM

## 2018-12-01 RX ORDER — BISACODYL 10 MG
10 SUPPOSITORY, RECTAL RECTAL
Status: DISCONTINUED | OUTPATIENT
Start: 2018-12-01 | End: 2018-12-02 | Stop reason: HOSPADM

## 2018-12-01 RX ORDER — DEXAMETHASONE SODIUM PHOSPHATE 4 MG/ML
4 INJECTION, SOLUTION INTRA-ARTICULAR; INTRALESIONAL; INTRAMUSCULAR; INTRAVENOUS; SOFT TISSUE
Status: DISCONTINUED | OUTPATIENT
Start: 2018-12-01 | End: 2018-12-01

## 2018-12-01 RX ORDER — OXYCODONE HYDROCHLORIDE 5 MG/1
5 TABLET ORAL EVERY 4 HOURS PRN
Status: DISCONTINUED | OUTPATIENT
Start: 2018-12-01 | End: 2018-12-02 | Stop reason: HOSPADM

## 2018-12-01 RX ORDER — DEXAMETHASONE SODIUM PHOSPHATE 4 MG/ML
10 INJECTION, SOLUTION INTRA-ARTICULAR; INTRALESIONAL; INTRAMUSCULAR; INTRAVENOUS; SOFT TISSUE EVERY 6 HOURS
Status: DISCONTINUED | OUTPATIENT
Start: 2018-12-01 | End: 2018-12-01

## 2018-12-01 RX ORDER — ACETAMINOPHEN 325 MG/1
650 TABLET ORAL EVERY 6 HOURS PRN
Status: DISCONTINUED | OUTPATIENT
Start: 2018-12-01 | End: 2018-12-01

## 2018-12-01 RX ORDER — DEXAMETHASONE 6 MG/1
6 TABLET ORAL EVERY 6 HOURS
Status: DISCONTINUED | OUTPATIENT
Start: 2018-12-01 | End: 2018-12-01

## 2018-12-01 RX ORDER — CLONIDINE HYDROCHLORIDE 0.1 MG/1
0.1 TABLET ORAL EVERY 4 HOURS PRN
Status: DISCONTINUED | OUTPATIENT
Start: 2018-12-01 | End: 2018-12-02 | Stop reason: HOSPADM

## 2018-12-01 RX ORDER — AMOXICILLIN 250 MG
1 CAPSULE ORAL
Status: DISCONTINUED | OUTPATIENT
Start: 2018-12-01 | End: 2018-12-02 | Stop reason: HOSPADM

## 2018-12-01 RX ORDER — RANITIDINE 15 MG/ML
75 SOLUTION ORAL 2 TIMES DAILY
Status: DISCONTINUED | OUTPATIENT
Start: 2018-12-01 | End: 2018-12-01

## 2018-12-01 RX ORDER — CEFAZOLIN SODIUM 2 G/100ML
2 INJECTION, SOLUTION INTRAVENOUS EVERY 8 HOURS
Status: DISCONTINUED | OUTPATIENT
Start: 2018-12-01 | End: 2018-12-01

## 2018-12-01 RX ORDER — SCOLOPAMINE TRANSDERMAL SYSTEM 1 MG/1
1 PATCH, EXTENDED RELEASE TRANSDERMAL
Status: DISCONTINUED | OUTPATIENT
Start: 2018-12-01 | End: 2018-12-02 | Stop reason: HOSPADM

## 2018-12-01 RX ORDER — POLYETHYLENE GLYCOL 3350 17 G/17G
1 POWDER, FOR SOLUTION ORAL 2 TIMES DAILY PRN
Status: DISCONTINUED | OUTPATIENT
Start: 2018-12-01 | End: 2018-12-02 | Stop reason: HOSPADM

## 2018-12-01 RX ORDER — LEVETIRACETAM 250 MG/1
500 TABLET ORAL 2 TIMES DAILY
Status: DISCONTINUED | OUTPATIENT
Start: 2018-12-01 | End: 2018-12-02 | Stop reason: HOSPADM

## 2018-12-01 RX ORDER — SODIUM CHLORIDE 9 MG/ML
INJECTION, SOLUTION INTRAVENOUS CONTINUOUS
Status: DISCONTINUED | OUTPATIENT
Start: 2018-12-01 | End: 2018-12-01

## 2018-12-01 RX ADMIN — FENTANYL CITRATE 25 MCG: 50 INJECTION, SOLUTION INTRAMUSCULAR; INTRAVENOUS at 00:51

## 2018-12-01 RX ADMIN — FENTANYL CITRATE 25 MCG: 50 INJECTION, SOLUTION INTRAMUSCULAR; INTRAVENOUS at 08:37

## 2018-12-01 RX ADMIN — OXYCODONE HYDROCHLORIDE 10 MG: 10 TABLET ORAL at 19:18

## 2018-12-01 RX ADMIN — DEXAMETHASONE 4 MG: 4 TABLET ORAL at 21:26

## 2018-12-01 RX ADMIN — STANDARDIZED SENNA CONCENTRATE AND DOCUSATE SODIUM 1 TABLET: 8.6; 5 TABLET, FILM COATED ORAL at 21:26

## 2018-12-01 RX ADMIN — SODIUM CHLORIDE: 9 INJECTION, SOLUTION INTRAVENOUS at 04:48

## 2018-12-01 RX ADMIN — SODIUM CHLORIDE 500 MG: 9 INJECTION, SOLUTION INTRAVENOUS at 06:29

## 2018-12-01 RX ADMIN — CEFAZOLIN SODIUM 2 G: 2 INJECTION, SOLUTION INTRAVENOUS at 05:51

## 2018-12-01 RX ADMIN — ACETAMINOPHEN 650 MG: 325 TABLET, FILM COATED ORAL at 23:42

## 2018-12-01 RX ADMIN — DOCUSATE SODIUM 100 MG: 100 CAPSULE, LIQUID FILLED ORAL at 18:10

## 2018-12-01 RX ADMIN — LEVETIRACETAM 500 MG: 250 TABLET ORAL at 18:10

## 2018-12-01 RX ADMIN — DEXAMETHASONE 4 MG: 4 TABLET ORAL at 13:40

## 2018-12-01 RX ADMIN — FAMOTIDINE 20 MG: 20 TABLET ORAL at 05:55

## 2018-12-01 RX ADMIN — ACETAMINOPHEN 650 MG: 325 TABLET, FILM COATED ORAL at 18:11

## 2018-12-01 RX ADMIN — DEXAMETHASONE 6 MG: 6 TABLET ORAL at 05:54

## 2018-12-01 RX ADMIN — FAMOTIDINE 20 MG: 20 TABLET ORAL at 18:10

## 2018-12-01 RX ADMIN — ACETAMINOPHEN 650 MG: 325 TABLET, FILM COATED ORAL at 13:41

## 2018-12-01 ASSESSMENT — PAIN SCALES - GENERAL
PAINLEVEL_OUTOF10: 5
PAINLEVEL_OUTOF10: 5
PAINLEVEL_OUTOF10: 3
PAINLEVEL_OUTOF10: 1
PAINLEVEL_OUTOF10: 6
PAINLEVEL_OUTOF10: 1
PAINLEVEL_OUTOF10: 4
PAINLEVEL_OUTOF10: 0
PAINLEVEL_OUTOF10: 1
PAINLEVEL_OUTOF10: 6
PAINLEVEL_OUTOF10: 5
PAINLEVEL_OUTOF10: 5
PAINLEVEL_OUTOF10: 3
PAINLEVEL_OUTOF10: 1

## 2018-12-01 NOTE — OP REPORT
DATE OF SERVICE:  11/30/2018    PREOPERATIVE DIAGNOSIS:  Metastatic adenocarcinoma to the brain, right   frontal.    POSTOPERATIVE DIAGNOSIS:  Metastatic adenocarcinoma to the brain, right   frontal.    PROCEDURES PERFORMED:  1.  Stealth guidance to the cranium.  2.  Right frontal craniotomy for resection of metastatic right frontal deep   brain tumor.  3.  Intraoperative monitoring.    SURGEON:  Stephen Manzano III, MD    ASSISTANT:  Guicho Hunt PA-C    ANESTHESIA:  General.    COMPLICATIONS:  None.    BLOOD LOSS:  100 mL    FINDINGS:  A vascular mucinous tumor removed in totality.  Good hemostasis at   the end.  Notable distance and trajectory from a motor strip taken into the   tumor.    DRAINS LEFT:  None.    COMPLICATIONS:  None.    DISPOSITION:  Extubated to recovery then to the ICU.    HISTORY OF PRESENT ILLNESS:  This 23-year-old right-handed man who had left   arm and facial weakness improved with Decadron who was found to have   multifocal neoplasm in both the lungs and the brain.  The initial tumor biopsy   was from the lung, the size of the brain, right frontal lesion was such that   resection was deemed important for long-term prognosis. I have explained the   risks and alternatives to the stealth-guided craniotomy for resection   including pain, infection, bleeding, CSF leak, failure to completely resolve   symptoms, neurologic deficit, weakness, numbness to clear the left arm and   face and need for adjuvant treatments, recurrent tumor, etc.  He understood   the risks, benefits, and agreed the consent.  He had a preoperative MRI   stealth and regain most of his function except for mild left pronator drift   preoperatively.    DESCRIPTION OF PROCEDURE:  The patient was brought to the operative suite,   placed under general anesthesia.  A Suarez catheter was placed.  Lines were   secured to a regular OR bed supine, placed in [place] headholder fixation.    The head turned slightly to the left and  the [place] head pickett affixed to   the bed. Preoperative needles for phase reversal and motor mapping were hooked up by NMA monitoroing We then turned our attention to the stealth guidance.    Stealth guidance for the cranium:  Using the surface markers on the head, we   registered the patient and matched to the preoperative MRI with very low error   within 2 mm.  We then used the stealth to draw out a curvilinear lazy S   incision in the right frontal region for the plan of about a 2 cm [place]   craniotomy.  We had excellent registration points and were happy with our   Stealth guidance.    We prepped and draped in sterile fashion after clipping that area of hair and   planning the incision with the Stealth.  Preoperative antibiotics were given.    Proper time-out was performed.  Patient was prepped and draped in sterile   fashion.  Preoperative Keppra and Decadron were given, mannitol on standby.    Craniotomy for tumor resection: A curvilinear incision was made and the soft tissues dissected with monopolar   electrocautery.  Tissue was held back with Amanda clips and self-retaining   retractor.  We then restealthed the patient to plan small craniotomy, made 1 bur hole and a   circular craniotomy with the B1 footplate, and put the bone flap in bacitracin infused saline soaked   gauze.  We then restealthed again planned to made circular durotomy with frontal area as   the base. The brain was slightly wollen in small dural defect, so 50grams mannitol as given.  We did motor mapping of cortical gyri in the field, and we were nowhere near motor cortex   with all the way up to 7 milliamps of stimulation, so we felt we were just in   a premotor area.  We found the available sulcus, opened it up with microscopic techniques,   brought the microscope in for increased visualization.  We initially attempted   to use a Inhibitex tubular tool, but we had some technical difficulties, so we   decided just to do an open  transsulcal approach with some transcortical   approach as well.  The Stealth guidance was invaluable to direct this in the   trajectory of the tumor.  We ran it at the oblong end of it to be able to   minimize the corticectomy, the transsulcal approach and the dime-sized   corticectomy made once we hit the gyrus.  We traced through with white matter using isocool bipolar, advanced patties,  and found the mucinous tumor.  The tumor was quite vascular, bled extensively   at multiple points, controlled with paddies and isocool bipolars.  We used   Pierre retractor to hold our small corticectomy trajectory open.  We disscted  circumferentially around the tumor, got all the way to the deep white   matter, a tumor draining vein was found.  This was taken.  The tumor appeared   to be removed in its totality with slow and meticulous work, as we had circumferential white matter everywhere   else, no further tumor tissue visible.  We confirmed with stealth we were at the distal margin of the tumor on preop scan and we were. We laid down Surgiflo and Surgicel in the resection bed, and irrigated until the   irrigant ran completely clear to confirm excellent hemostasis.  We withdrew the   retractors.  Brain was less swollen it was upon entry.  We laid the native   dura back down and tacked down at several points and then laid Duragen down to   the dural defect from bipolar use.  We used standard cranial plates to refix the cranial plate   back to the skull.  We copiously irrigated with bacitracin infused saline,   and assured hemostasis (no drain necessary due to control and small wound) after removing the Amanda clips and closed the wound in anatomic layers with 2-0 Vicryl for the galea and staples for skin.  Sterile   dressing was applied.  Patient was removed from [place] headholder fixation   without incident and extubated.    There were no complications.  Needle and sponge count were correct at the end   of the  case.    SPECIMEN SENT:  Permanent pieces of mucinous tumor, although the primary lung   adenocarcinoma was already known from the lung biopsies.       ____________________________________     MD BECKIE Dhaliwal III / MARCIN    DD:  11/30/2018 20:56:12  DT:  11/30/2018 22:43:15    D#:  [id number]  Job#:  [id number]

## 2018-12-01 NOTE — OR SURGEON
Immediate Post OP Note    PreOp Diagnosis: Right frontal lobe Brain mass with Hx Lung CA    PostOp Diagnosis: Same    Procedure(s):  CRANIOTOMY STEALTH- FRONTAL BRAIN TUMOR RESECTION - Wound Class: Clean    Surgeon(s):  Stephen Manzano III, M.D.    Anesthesiologist/Type of Anesthesia:  Anesthesiologist: Wilbert Guerrero M.D./General    Surgical Staff:  Circulator: Mis Tee R.N.; Shawna Kirkpatrick R.N.  Relief Circulator: Marci Saini R.N.  Scrub Person: Solo Banerjee    Specimens removed if any:  ID Type Source Tests Collected by Time Destination   A : Right frontal brain tumor Tissue Brain PATHOLOGY SPECIMEN Stephen Manzano III, M.D. 11/30/2018 1903        Estimated Blood Loss: 100    Findings: See Op Note    Complications: None        11/30/2018 8:55 PM Teofilo Hunt P.A.-C.

## 2018-12-01 NOTE — OR NURSING
Pt AA/Ox4. VSS. Dressing to head, CDI. CMS+. Noted weaker left side of body, Dr. Manzano aware. Pt reports 3/10 pain to head at this time. Pain medications given. Pt denies numbness or tingling. No nausea or vomiting. Intact avelar catheter, drained 550cc clear yellow urine. SCDs in place. Report given to TALA Merritt. Pt's mother updated. Awaiting transport.

## 2018-12-01 NOTE — OR NURSING
Pt via bed, accompanied by transport and ACLS RN, was transferred to [facility] at 2211. All personal belongings sent with Pt.

## 2018-12-01 NOTE — PROGRESS NOTES
Dr. Melyssa farooq. Awaiting page back.  2250: Dr. Ragsdale paged back. Updated pt's surgical intervention.Updated that the pt is a pt of . Orders given.

## 2018-12-01 NOTE — PROGRESS NOTES
Neurosurgery    Facial and arm function improved, eating. Surveillence postop CT scan shows resection bed with minimal hemorrhage, edema present    Exam  Awake and alert  Mild left facial droop improved from postop  Mild left drift, still has some premotor initiation issues with LUE  Leg full  Right side full  Dsg clean    POD#1 R frontal deep brain tumor resection  -d.c avelar, fluids, a-line  -decadron taper over 4 days, keppra 1 month  -to floor, PT  -will need postop XRT and Crx  -possibly home in am

## 2018-12-01 NOTE — PROGRESS NOTES
Dr. Manzano at bedside. Orders received to transfer pt to neurosurgery unit, perform Q4 hour neuro checks and discontinue avelar catheter and arterial line.

## 2018-12-01 NOTE — CARE PLAN
Problem: Safety  Goal: Will remain free from injury  Outcome: PROGRESSING AS EXPECTED  Fall safety precautions in place, hourly rounding performed. Socks on, bed locked and lowered, personal possessions within reach, bed alarm on. Pt remains injury free.    Problem: Pain Management  Goal: Pain level will decrease to patient's comfort goal  Outcome: PROGRESSING AS EXPECTED  Pain assessment completed using appropriate scale and PRN medications administered per MAR

## 2018-12-01 NOTE — CARE PLAN
Problem: Safety  Goal: Will remain free from injury  Call bell with in reach    Problem: Pain Management  Goal: Pain level will decrease to patient's comfort goal    Intervention: Follow pain managment plan developed in collaboration with patient and Interdisciplinary Team  Pain assessed q2 hours

## 2018-12-01 NOTE — PROGRESS NOTES
Pt transferred to CT on ICU monitor and 3 L O2 NC. VSS.     0950 Pt returned to S124 without incident.

## 2018-12-01 NOTE — PROGRESS NOTES
Call received from Dr. Manzano. Updates provided by this RN regarding patient's current neuro assessment. Orders received to change repeat head CT time to this morning. Orders implemented.

## 2018-12-02 VITALS
HEART RATE: 63 BPM | TEMPERATURE: 97.8 F | BODY MASS INDEX: 23.92 KG/M2 | SYSTOLIC BLOOD PRESSURE: 121 MMHG | DIASTOLIC BLOOD PRESSURE: 74 MMHG | OXYGEN SATURATION: 95 % | RESPIRATION RATE: 18 BRPM | WEIGHT: 170.86 LBS | HEIGHT: 71 IN

## 2018-12-02 LAB
ERYTHROCYTE [DISTWIDTH] IN BLOOD BY AUTOMATED COUNT: 38.6 FL (ref 35.9–50)
HCT VFR BLD AUTO: 41 % (ref 42–52)
HGB BLD-MCNC: 13.6 G/DL (ref 14–18)
INR PPP: 1.03 (ref 0.87–1.13)
MCH RBC QN AUTO: 27.5 PG (ref 27–33)
MCHC RBC AUTO-ENTMCNC: 33.2 G/DL (ref 33.7–35.3)
MCV RBC AUTO: 82.8 FL (ref 81.4–97.8)
PLATELET # BLD AUTO: 199 K/UL (ref 164–446)
PMV BLD AUTO: 8.7 FL (ref 9–12.9)
PROTHROMBIN TIME: 13.6 SEC (ref 12–14.6)
RBC # BLD AUTO: 4.95 M/UL (ref 4.7–6.1)
WBC # BLD AUTO: 11.7 K/UL (ref 4.8–10.8)

## 2018-12-02 PROCEDURE — A9270 NON-COVERED ITEM OR SERVICE: HCPCS | Performed by: NEUROLOGICAL SURGERY

## 2018-12-02 PROCEDURE — A9270 NON-COVERED ITEM OR SERVICE: HCPCS | Performed by: INTERNAL MEDICINE

## 2018-12-02 PROCEDURE — 85027 COMPLETE CBC AUTOMATED: CPT

## 2018-12-02 PROCEDURE — 36415 COLL VENOUS BLD VENIPUNCTURE: CPT

## 2018-12-02 PROCEDURE — 700102 HCHG RX REV CODE 250 W/ 637 OVERRIDE(OP): Performed by: INTERNAL MEDICINE

## 2018-12-02 PROCEDURE — A9270 NON-COVERED ITEM OR SERVICE: HCPCS | Performed by: PHYSICIAN ASSISTANT

## 2018-12-02 PROCEDURE — 700102 HCHG RX REV CODE 250 W/ 637 OVERRIDE(OP): Performed by: PHYSICIAN ASSISTANT

## 2018-12-02 PROCEDURE — G8980 MOBILITY D/C STATUS: HCPCS | Mod: CH

## 2018-12-02 PROCEDURE — G8978 MOBILITY CURRENT STATUS: HCPCS | Mod: CH

## 2018-12-02 PROCEDURE — 700112 HCHG RX REV CODE 229: Performed by: PHYSICIAN ASSISTANT

## 2018-12-02 PROCEDURE — G8979 MOBILITY GOAL STATUS: HCPCS | Mod: CH

## 2018-12-02 PROCEDURE — 700102 HCHG RX REV CODE 250 W/ 637 OVERRIDE(OP): Performed by: NEUROLOGICAL SURGERY

## 2018-12-02 PROCEDURE — 85610 PROTHROMBIN TIME: CPT

## 2018-12-02 PROCEDURE — 97161 PT EVAL LOW COMPLEX 20 MIN: CPT

## 2018-12-02 RX ORDER — DEXAMETHASONE 1 MG
1 TABLET ORAL EVERY 12 HOURS
Status: DISCONTINUED | OUTPATIENT
Start: 2018-12-06 | End: 2018-12-02 | Stop reason: HOSPADM

## 2018-12-02 RX ORDER — DEXAMETHASONE 1 MG
2 TABLET ORAL EVERY 8 HOURS
Status: DISCONTINUED | OUTPATIENT
Start: 2018-12-02 | End: 2018-12-02 | Stop reason: HOSPADM

## 2018-12-02 RX ORDER — DEXAMETHASONE 1 MG
1 TABLET ORAL EVERY 8 HOURS
Status: DISCONTINUED | OUTPATIENT
Start: 2018-12-04 | End: 2018-12-02 | Stop reason: HOSPADM

## 2018-12-02 RX ORDER — OXYCODONE HYDROCHLORIDE 5 MG/1
5-10 TABLET ORAL EVERY 6 HOURS PRN
Qty: 112 TAB | Refills: 0 | Status: SHIPPED | OUTPATIENT
Start: 2018-12-02 | End: 2018-12-16

## 2018-12-02 RX ORDER — DEXAMETHASONE 1 MG
1 TABLET ORAL EVERY 8 HOURS
Qty: 10 TAB | Refills: 0 | Status: SHIPPED | OUTPATIENT
Start: 2018-12-04 | End: 2019-11-27

## 2018-12-02 RX ORDER — DEXAMETHASONE 1 MG
1 TABLET ORAL EVERY 8 HOURS
Status: DISCONTINUED | OUTPATIENT
Start: 2018-12-02 | End: 2018-12-02

## 2018-12-02 RX ORDER — LEVETIRACETAM 500 MG/1
500 TABLET ORAL 2 TIMES DAILY
Qty: 60 TAB | Refills: 0 | Status: SHIPPED | OUTPATIENT
Start: 2018-12-02 | End: 2019-11-27

## 2018-12-02 RX ADMIN — ACETAMINOPHEN 650 MG: 325 TABLET, FILM COATED ORAL at 04:57

## 2018-12-02 RX ADMIN — DOCUSATE SODIUM 100 MG: 100 CAPSULE, LIQUID FILLED ORAL at 04:58

## 2018-12-02 RX ADMIN — FAMOTIDINE 20 MG: 20 TABLET ORAL at 04:57

## 2018-12-02 RX ADMIN — DEXAMETHASONE 4 MG: 4 TABLET ORAL at 04:57

## 2018-12-02 RX ADMIN — LEVETIRACETAM 500 MG: 250 TABLET ORAL at 04:58

## 2018-12-02 ASSESSMENT — GAIT ASSESSMENTS
GAIT LEVEL OF ASSIST: SUPERVISED
DISTANCE (FEET): 1000

## 2018-12-02 ASSESSMENT — PATIENT HEALTH QUESTIONNAIRE - PHQ9
1. LITTLE INTEREST OR PLEASURE IN DOING THINGS: NOT AT ALL
SUM OF ALL RESPONSES TO PHQ9 QUESTIONS 1 AND 2: 0
2. FEELING DOWN, DEPRESSED, IRRITABLE, OR HOPELESS: NOT AT ALL

## 2018-12-02 ASSESSMENT — COGNITIVE AND FUNCTIONAL STATUS - GENERAL
SUGGESTED CMS G CODE MODIFIER MOBILITY: CH
MOBILITY SCORE: 24

## 2018-12-02 ASSESSMENT — PAIN SCALES - GENERAL
PAINLEVEL_OUTOF10: 0

## 2018-12-02 ASSESSMENT — LIFESTYLE VARIABLES
ALCOHOL_USE: NO
EVER_SMOKED: NEVER

## 2018-12-02 NOTE — PROGRESS NOTES
Report given to Em EDGAR via phone. Pt transported to [facility] via [facility] with all personal belongings, chart and medications.

## 2018-12-02 NOTE — PROGRESS NOTES
Patient resting quietly in bed, no S/S of distress, A&Ox4. Denies SOB, chest pain, dizziness. Call light within reach,  pt calls appropriately and does not get out of bed. Bed in lowest position, bed locked, RN and CNA numbers provided, no further needs at this time. No changes from EPIC. Hourly rounding in place.

## 2018-12-02 NOTE — DISCHARGE INSTRUCTIONS
Discharge Instructions    Discharged to home by car with relative. Discharged via wheelchair, hospital escort: Refused.  Special equipment needed: Not Applicable    Be sure to schedule a follow-up appointment with your primary care doctor or any specialists as instructed.     Discharge Plan:   Diet Plan: Discussed  Activity Level: Discussed  Confirmed Follow up Appointment: Patient to Call and Schedule Appointment  Confirmed Symptoms Management: Discussed  Medication Reconciliation Updated: Yes  Influenza Vaccine Indication: Patient Refuses    I understand that a diet low in cholesterol, fat, and sodium is recommended for good health. Unless I have been given specific instructions below for another diet, I accept this instruction as my diet prescription.   Other diet: Regular    Special Instructions: None    · Is patient discharged on Warfarin / Coumadin?   No     Depression / Suicide Risk    As you are discharged from this [facility] facility, it is important to learn how to keep safe from harming yourself.    Recognize the warning signs:  · Abrupt changes in personality, positive or negative- including increase in energy   · Giving away possessions  · Change in eating patterns- significant weight changes-  positive or negative  · Change in sleeping patterns- unable to sleep or sleeping all the time   · Unwillingness or inability to communicate  · Depression  · Unusual sadness, discouragement and loneliness  · Talk of wanting to die  · Neglect of personal appearance   · Rebelliousness- reckless behavior  · Withdrawal from people/activities they love  · Confusion- inability to concentrate     If you or a loved one observes any of these behaviors or has concerns about self-harm, here's what you can do:  · Talk about it- your feelings and reasons for harming yourself  · Remove any means that you might use to hurt yourself (examples: pills, rope, extension cords, firearm)  · Get professional help from the community  (Mental Health, Substance Abuse, psychological counseling)  · Do not be alone:Call your Safe Contact- someone whom you trust who will be there for you.  · Call your local CRISIS HOTLINE 078-6612 or 166-105-2605  · Call your local Children's Mobile Crisis Response Team [place] (567) 564-8408 or www.[employer]  · Call the toll free National Suicide Prevention Hotlines   · National Suicide Prevention Lifeline 871-306-KJTD (9972)  · National Hope Line Network 800-SUICIDE (979-2716)

## 2018-12-02 NOTE — PROGRESS NOTES
Neurosurgery    Exam  Awake and alert  Mild left facial droop improved from postop  Mild left drift, still has some premotor initiation issues with LUE  Leg full  Right side full  Dsg clean     POD#2 R frontal deep brain tumor resection, doing well, path pending but has known lung adenoCA  -decadron taper over 3 days, keppra 1 month  -will need postop XRT and Crx  -home today

## 2018-12-02 NOTE — THERAPY
"22 y/o male adm for LUE weakness dx: right frontal lobe tumor S/P crani. Pt with LUE weakness. Intact motor and sensory components BLE. intact balance during higher level balance activities. Intact BLE coordination. No reports of pain, dizziness, light headedness during mobility.  No further acute P T services required at this time.    Physical Therapy Evaluation completed.   Bed Mobility:  Supine to Sit: Supervised  Transfers: Sit to Stand: Supervised  Gait: Level Of Assist: Supervised with No Equipment Needed       Plan of Care: Patient with no further skilled PT needs in the acute care setting at this time  Discharge Recommendations: Equipment: No Equipment Needed. Post-acute therapy Currently anticipate no further skilled therapy needs once patient is discharged from the inpatient setting.    See \"Rehab Therapy-Acute\" Patient Summary Report for complete documentation.     "

## 2018-12-02 NOTE — PROGRESS NOTES
Pt has not voided since avelar removed at 1100 on 12/1/18.  States he needs to, but cannot void due to pain in the urethra.  Medication given for pain per MAR.  States he will try again later.

## 2018-12-02 NOTE — PROGRESS NOTES
Pt ambulates independently in room.  Steady gait.  Lee Zev score= No Risk.  No bed alarm required.

## 2018-12-02 NOTE — PROGRESS NOTES
2 RN skin check completed.  No medical devices in place.  No pressure ulcers present.  Surgical incision to head, CDI.

## 2018-12-03 ENCOUNTER — APPOINTMENT (OUTPATIENT)
Dept: RADIOLOGY | Facility: MEDICAL CENTER | Age: 23
End: 2018-12-03
Attending: INTERNAL MEDICINE
Payer: COMMERCIAL

## 2018-12-03 DIAGNOSIS — C34.90 PRIMARY MALIGNANT NEOPLASM OF LUNG METASTATIC TO OTHER SITE, UNSPECIFIED LATERALITY (HCC): ICD-10-CM

## 2018-12-03 NOTE — DISCHARGE SUMMARY
DATE OF ADMISSION:  11/30/2018    DATE OF DISCHARGE:  12/02/2018    SURGERY PERFORMED:  Stealth-guided right frontal craniotomy for resection of   metastatic right frontal deep brain tumor performed on 11/30/2018 by Dr. Stephen Manzano.    COMPLICATIONS:  None.    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  He did develop   neurologic deficit from this.  His workup did reveal this right frontal   lesion.  He was indicated for surgical decompression and resection.    HOSPITAL COURSE:  The patient was admitted on date of surgery.  He underwent   surgery without complication.  After recovery, he was transferred to the   intensive care unit.  He was monitored in the intensive care unit overnight   and transferred to neurosurgical floor on postoperative day #1.  Now on   postoperative day #2, he is ambulating independently.  His pain is well   controlled.  He does have a mild left drift and mild left facial droop, both   improved.  At this point, he is now cleared for discharge home after an   uneventful hospital stay.    INSTRUCTIONS ON DISCHARGE:  The patient is to ambulate as tolerated.  He is to   avoid strenuous activity.  His diet remains regular as tolerated.    DISCHARGE MEDICATIONS:  Include oxycodone 5 mg 1-2 p.o. q. 6 hours p.r.n.   pain, Decadron taper over 3 days, and Keppra 500 mg twice daily for 1 month.    All of the patient's questions have been answered.  He is discharged home in   stable condition.       ____________________________________     LUIS ROMAN / MARCIN    DD:  12/02/2018 09:28:33  DT:  12/02/2018 16:14:10    D#:  [id number]  Job#:  [id number]

## 2018-12-04 ENCOUNTER — HOSPITAL ENCOUNTER (OUTPATIENT)
Dept: RADIATION ONCOLOGY | Facility: MEDICAL CENTER | Age: 23
End: 2018-12-31
Attending: RADIOLOGY
Payer: COMMERCIAL

## 2018-12-21 ENCOUNTER — HOSPITAL ENCOUNTER (OUTPATIENT)
Dept: RADIOLOGY | Facility: MEDICAL CENTER | Age: 23
End: 2018-12-21
Attending: RADIOLOGY
Payer: COMMERCIAL

## 2018-12-21 DIAGNOSIS — C34.90 PRIMARY MALIGNANT NEOPLASM OF LUNG METASTATIC TO OTHER SITE, UNSPECIFIED LATERALITY (HCC): ICD-10-CM

## 2018-12-21 PROCEDURE — 70553 MRI BRAIN STEM W/O & W/DYE: CPT

## 2018-12-21 PROCEDURE — 700117 HCHG RX CONTRAST REV CODE 255: Performed by: RADIOLOGY

## 2018-12-21 PROCEDURE — A9585 GADOBUTROL INJECTION: HCPCS | Performed by: RADIOLOGY

## 2018-12-21 RX ORDER — GADOBUTROL 604.72 MG/ML
7.5 INJECTION INTRAVENOUS ONCE
Status: COMPLETED | OUTPATIENT
Start: 2018-12-21 | End: 2018-12-21

## 2018-12-21 RX ADMIN — GADOBUTROL 7.5 ML: 604.72 INJECTION INTRAVENOUS at 15:04

## 2018-12-26 ENCOUNTER — HOSPITAL ENCOUNTER (OUTPATIENT)
Dept: RADIATION ONCOLOGY | Facility: MEDICAL CENTER | Age: 23
End: 2018-12-26

## 2018-12-26 PROCEDURE — 77334 RADIATION TREATMENT AID(S): CPT | Performed by: RADIOLOGY

## 2018-12-26 PROCEDURE — 77263 THER RADIOLOGY TX PLNG CPLX: CPT | Performed by: RADIOLOGY

## 2018-12-26 PROCEDURE — 77470 SPECIAL RADIATION TREATMENT: CPT | Mod: 26 | Performed by: RADIOLOGY

## 2018-12-26 PROCEDURE — 77334 RADIATION TREATMENT AID(S): CPT | Mod: 26 | Performed by: RADIOLOGY

## 2018-12-26 PROCEDURE — 77470 SPECIAL RADIATION TREATMENT: CPT | Performed by: RADIOLOGY

## 2018-12-26 PROCEDURE — 77290 THER RAD SIMULAJ FIELD CPLX: CPT | Performed by: RADIOLOGY

## 2018-12-26 PROCEDURE — 77290 THER RAD SIMULAJ FIELD CPLX: CPT | Mod: 26 | Performed by: RADIOLOGY

## 2018-12-28 ENCOUNTER — PATIENT OUTREACH (OUTPATIENT)
Dept: OTHER | Facility: MEDICAL CENTER | Age: 23
End: 2018-12-28

## 2019-01-02 ENCOUNTER — HOSPITAL ENCOUNTER (OUTPATIENT)
Dept: RADIATION ONCOLOGY | Facility: MEDICAL CENTER | Age: 24
End: 2019-01-31
Attending: RADIOLOGY
Payer: COMMERCIAL

## 2019-01-02 PROCEDURE — 77370 RADIATION PHYSICS CONSULT: CPT | Performed by: RADIOLOGY

## 2019-01-03 ENCOUNTER — HOSPITAL ENCOUNTER (OUTPATIENT)
Dept: LAB | Facility: MEDICAL CENTER | Age: 24
End: 2019-01-03
Attending: NURSE PRACTITIONER
Payer: COMMERCIAL

## 2019-01-03 LAB — AMBIGUOUS DTTM AMBI4: NORMAL

## 2019-01-03 PROCEDURE — 82550 ASSAY OF CK (CPK): CPT

## 2019-01-03 PROCEDURE — 80053 COMPREHEN METABOLIC PANEL: CPT

## 2019-01-04 LAB
ALBUMIN SERPL BCP-MCNC: 4.5 G/DL (ref 3.2–4.9)
ALBUMIN/GLOB SERPL: 1.7 G/DL
ALP SERPL-CCNC: 83 U/L (ref 30–99)
ALT SERPL-CCNC: 64 U/L (ref 2–50)
ANION GAP SERPL CALC-SCNC: 11 MMOL/L (ref 0–11.9)
AST SERPL-CCNC: 40 U/L (ref 12–45)
BILIRUB SERPL-MCNC: 0.6 MG/DL (ref 0.1–1.5)
BUN SERPL-MCNC: 18 MG/DL (ref 8–22)
CALCIUM SERPL-MCNC: 10.1 MG/DL (ref 8.5–10.5)
CHLORIDE SERPL-SCNC: 107 MMOL/L (ref 96–112)
CK SERPL-CCNC: 123 U/L (ref 0–154)
CO2 SERPL-SCNC: 23 MMOL/L (ref 20–33)
CREAT SERPL-MCNC: 1.01 MG/DL (ref 0.5–1.4)
GLOBULIN SER CALC-MCNC: 2.7 G/DL (ref 1.9–3.5)
GLUCOSE SERPL-MCNC: 79 MG/DL (ref 65–99)
POTASSIUM SERPL-SCNC: 4.2 MMOL/L (ref 3.6–5.5)
PROT SERPL-MCNC: 7.2 G/DL (ref 6–8.2)
SODIUM SERPL-SCNC: 141 MMOL/L (ref 135–145)

## 2019-01-04 PROCEDURE — 77295 3-D RADIOTHERAPY PLAN: CPT | Performed by: RADIOLOGY

## 2019-01-04 PROCEDURE — 77334 RADIATION TREATMENT AID(S): CPT | Mod: 26 | Performed by: RADIOLOGY

## 2019-01-04 PROCEDURE — 77300 RADIATION THERAPY DOSE PLAN: CPT | Mod: 26 | Performed by: RADIOLOGY

## 2019-01-04 PROCEDURE — 77295 3-D RADIOTHERAPY PLAN: CPT | Mod: 26 | Performed by: RADIOLOGY

## 2019-01-04 PROCEDURE — 77334 RADIATION TREATMENT AID(S): CPT | Performed by: RADIOLOGY

## 2019-01-04 PROCEDURE — 77300 RADIATION THERAPY DOSE PLAN: CPT | Performed by: RADIOLOGY

## 2019-01-07 ENCOUNTER — HOSPITAL ENCOUNTER (OUTPATIENT)
Dept: RADIATION ONCOLOGY | Facility: MEDICAL CENTER | Age: 24
End: 2019-01-07

## 2019-01-07 PROCEDURE — 77280 THER RAD SIMULAJ FIELD SMPL: CPT | Performed by: RADIOLOGY

## 2019-01-07 PROCEDURE — 77280 THER RAD SIMULAJ FIELD SMPL: CPT | Mod: 26 | Performed by: RADIOLOGY

## 2019-01-07 PROCEDURE — 77373 STRTCTC BDY RAD THER TX DLVR: CPT | Performed by: RADIOLOGY

## 2019-01-07 PROCEDURE — 77435 SBRT MANAGEMENT: CPT | Performed by: RADIOLOGY

## 2019-01-08 ENCOUNTER — HOSPITAL ENCOUNTER (OUTPATIENT)
Dept: RADIATION ONCOLOGY | Facility: MEDICAL CENTER | Age: 24
End: 2019-01-08

## 2019-01-08 PROCEDURE — 77373 STRTCTC BDY RAD THER TX DLVR: CPT | Performed by: RADIOLOGY

## 2019-01-08 PROCEDURE — 77280 THER RAD SIMULAJ FIELD SMPL: CPT | Mod: 26 | Performed by: RADIOLOGY

## 2019-01-08 PROCEDURE — 77280 THER RAD SIMULAJ FIELD SMPL: CPT | Performed by: RADIOLOGY

## 2019-01-09 ENCOUNTER — HOSPITAL ENCOUNTER (OUTPATIENT)
Dept: RADIATION ONCOLOGY | Facility: MEDICAL CENTER | Age: 24
End: 2019-01-09

## 2019-01-09 PROCEDURE — 77280 THER RAD SIMULAJ FIELD SMPL: CPT | Performed by: RADIOLOGY

## 2019-01-09 PROCEDURE — 77373 STRTCTC BDY RAD THER TX DLVR: CPT | Performed by: RADIOLOGY

## 2019-01-09 PROCEDURE — 77280 THER RAD SIMULAJ FIELD SMPL: CPT | Mod: 26 | Performed by: RADIOLOGY

## 2019-01-28 ENCOUNTER — HOSPITAL ENCOUNTER (OUTPATIENT)
Dept: RADIOLOGY | Facility: MEDICAL CENTER | Age: 24
End: 2019-01-28
Attending: INTERNAL MEDICINE
Payer: COMMERCIAL

## 2019-01-28 DIAGNOSIS — C34.92 SQUAMOUS CARCINOMA OF LUNG, LEFT (HCC): ICD-10-CM

## 2019-01-28 PROCEDURE — 700117 HCHG RX CONTRAST REV CODE 255: Performed by: INTERNAL MEDICINE

## 2019-01-28 PROCEDURE — 71260 CT THORAX DX C+: CPT

## 2019-01-28 RX ADMIN — IOHEXOL 75 ML: 350 INJECTION, SOLUTION INTRAVENOUS at 09:40

## 2019-02-22 ENCOUNTER — HOSPITAL ENCOUNTER (OUTPATIENT)
Dept: RADIATION ONCOLOGY | Facility: MEDICAL CENTER | Age: 24
End: 2019-02-28
Attending: RADIOLOGY
Payer: COMMERCIAL

## 2019-02-22 VITALS
BODY MASS INDEX: 25.35 KG/M2 | HEIGHT: 72 IN | WEIGHT: 187.2 LBS | DIASTOLIC BLOOD PRESSURE: 80 MMHG | OXYGEN SATURATION: 97 % | TEMPERATURE: 98.7 F | SYSTOLIC BLOOD PRESSURE: 129 MMHG | HEART RATE: 70 BPM

## 2019-02-22 DIAGNOSIS — C79.31 BRAIN METASTASES: ICD-10-CM

## 2019-02-22 PROCEDURE — 99212 OFFICE O/P EST SF 10 MIN: CPT | Performed by: RADIOLOGY

## 2019-02-22 ASSESSMENT — PAIN SCALES - GENERAL: PAINLEVEL: NO PAIN

## 2019-02-22 NOTE — PROGRESS NOTES
RADIATION ONCOLOGY FOLLOW-UP    DATE OF SERVICE: 2/22/2019    IDENTIFICATION:   A 23 y.o. 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  Stereotactic radio therapy to the right frontal excised brain metastasis and left cerebellar metastasis complete 1/10/2019    HISTORY OF PRESENT ILLNESS:   Since last seen patient's been doing well he has been off Decadron and Keppra he has headaches about once a day he takes oxycodone as needed.  He is continuing on alectinib with Dr. Castorena.  He had a recent CT scan of the chest which showed a substantial decrease in the primary mass where in November it measured 7.2 x 5.3 now measures 3.7 x 2.5.  There is now an ill-defined opacity in the peripheral left upper lobe which is new and measures 1.7 x 1.7 there is also an ill-defined groundglass density in the right upper lobe inferiorly measuring 1.2 cm.  There is an ill-defined left upper lobe nodule measuring 4 mm and left lower lobe ill-defined groundglass nodules are seen on image 5.    CURRENT MEDICATIONS:  Current Outpatient Prescriptions   Medication Sig Dispense Refill   • dexamethasone (DECADRON) 1 MG Tab Take 1 Tab by mouth every 8 hours. (Patient not taking: Reported on 2/22/2019) 10 Tab 0   • levETIRAcetam (KEPPRA) 500 MG Tab Take 1 Tab by mouth 2 Times a Day. (Patient not taking: Reported on 2/22/2019) 60 Tab 0   • raNITidine 15 mg/mL (ZANTAC) Syrup Take 75 mg by mouth 2 Times a Day.     • meloxicam (MOBIC) 15 MG tablet Take 15 mg by mouth as needed (For headaches).       No current facility-administered medications for this encounter.        ALLERGIES:  Patient has no known allergies.    FAMILY HISTORY:    Family History   Problem Relation Age of Onset   • Heart Disease Mother    • Rheumatologic Disease Mother         aortitis   • Rheumatologic Disease Father    • Diabetes Maternal Grandfather    • Cancer Maternal Grandfather    • Stroke Maternal Grandmother    [unfilled]        SOCIAL HISTORY:     reports  that he has never smoked. He has never used smokeless tobacco. He reports that he drinks alcohol. He reports that he does not use drugs.        REVIEW OF SYSTEMS: Is significant for that mentioned in the HPI.  The rest of the review of systems has been reviewed by me and is documented in the nursing note in [facility] dated 11/21/2018    PHYSICAL EXAM:     ECOG PERFORMANCE STATUS:  0= Fully active, able to carry on all pre-disease performance without restriction.       Vitals:    02/22/19 1031   BP: 129/80   BP Location: Left arm   Patient Position: Sitting   BP Cuff Size: Adult   Pulse: 70   Temp: 37.1 °C (98.7 °F)   TempSrc: Temporal   SpO2: 97%   Weight: 84.9 kg (187 lb 3.2 oz)   Height: 1.829 m (6')   Pain Score: No pain        GENERAL: Well-appearing alert and oriented x3 in no apparent distress in good spirits.  He has alopecia on the right frontal region consistent with the stereotactic radiotherapy and also in the left cerebellar skin area.  HEENT:  Pupils are equal, round, and reactive to light.  Extraocular muscles   are intact. Sclerae nonicteric.  Conjunctivae pink.  Oral cavity, tongue   protrudes midline.   NECK:  Supple without evidence of thyromegaly.  NODES:  No peripheral adenopathy of the neck, supraclavicular fossa or axillae   bilaterally.  LUNGS:  Clear to ascultation   HEART:  Regular rate and rhythm.  No murmur appreciated  ABDOMEN:  Soft. No evidence of hepatosplenomegaly.  Positive bowel sounds.  EXTREMITIES:  Without Edema.  NEUROLOGIC:  Cranial nerves II through XII were intact. Normal stance and gait motor and sensory grossly within normal limits          IMPRESSION:    A 23 y.o. 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  Stereotactic radio therapy to the right frontal excised brain metastasis and left cerebellar metastasis complete 1/10/2019.  On alectinib with a substantial decrease in the size of the primary mass although there is questionable other lesions in the lung  infectious versus metastasis.    RECOMMENDATIONS:   He is going to follow with Dr. Castorena and the doctor MANSI Brothers regarding systemic disease.  I am going to order an MRI scan of the brain for 6 weeks from now which will be 6 months post stereotactic radiotherapy.  I will see him in follow-up at that time or sooner as needed.      Thank you for the opportunity to participate in his care.  If any questions or comments, please do not hesitate in calling.      Please note that this dictation was created using voice recognition software. I have made every reasonable attempt to correct obvious errors, but I expect that there are errors of grammar and possibly content that I did not discover before finalizing the note.

## 2019-02-22 NOTE — NON-PROVIDER
Patient was seen today in clinic with Dr. Davis for Follow up.  Vitals signs and weight were obtained and pain assessment was completed.  Allergies and medications were reviewed with the patient.  Toxicities of treatment assessed.     Vitals/Pain:  Vitals:    02/22/19 1031   BP: 129/80   BP Location: Left arm   Patient Position: Sitting   BP Cuff Size: Adult   Pulse: 70   Temp: 37.1 °C (98.7 °F)   TempSrc: Temporal   SpO2: 97%   Weight: 84.9 kg (187 lb 3.2 oz)   Height: 1.829 m (6')   Pain Score: No pain        Allergies:   Patient has no known allergies.    Current Medications:  Current Outpatient Prescriptions   Medication Sig Dispense Refill   • dexamethasone (DECADRON) 1 MG Tab Take 1 Tab by mouth every 8 hours. (Patient not taking: Reported on 2/22/2019) 10 Tab 0   • levETIRAcetam (KEPPRA) 500 MG Tab Take 1 Tab by mouth 2 Times a Day. (Patient not taking: Reported on 2/22/2019) 60 Tab 0   • raNITidine 15 mg/mL (ZANTAC) Syrup Take 75 mg by mouth 2 Times a Day.     • meloxicam (MOBIC) 15 MG tablet Take 15 mg by mouth as needed (For headaches).       No current facility-administered medications for this encounter.          PCP:  Heath Buenrostro, [facility]

## 2019-02-26 ENCOUNTER — HOSPITAL ENCOUNTER (OUTPATIENT)
Dept: LAB | Facility: MEDICAL CENTER | Age: 24
End: 2019-02-26
Attending: INTERNAL MEDICINE
Payer: COMMERCIAL

## 2019-02-26 LAB
ALBUMIN SERPL BCP-MCNC: 4.6 G/DL (ref 3.2–4.9)
ALBUMIN/GLOB SERPL: 1.6 G/DL
ALP SERPL-CCNC: 80 U/L (ref 30–99)
ALT SERPL-CCNC: 45 U/L (ref 2–50)
ANION GAP SERPL CALC-SCNC: 10 MMOL/L (ref 0–11.9)
AST SERPL-CCNC: 32 U/L (ref 12–45)
BASOPHILS # BLD AUTO: 0.5 % (ref 0–1.8)
BASOPHILS # BLD: 0.02 K/UL (ref 0–0.12)
BILIRUB SERPL-MCNC: 1.2 MG/DL (ref 0.1–1.5)
BUN SERPL-MCNC: 15 MG/DL (ref 8–22)
CALCIUM SERPL-MCNC: 9.6 MG/DL (ref 8.5–10.5)
CHLORIDE SERPL-SCNC: 106 MMOL/L (ref 96–112)
CK SERPL-CCNC: 71 U/L (ref 0–154)
CO2 SERPL-SCNC: 26 MMOL/L (ref 20–33)
CREAT SERPL-MCNC: 1.12 MG/DL (ref 0.5–1.4)
EOSINOPHIL # BLD AUTO: 0.13 K/UL (ref 0–0.51)
EOSINOPHIL NFR BLD: 3.5 % (ref 0–6.9)
ERYTHROCYTE [DISTWIDTH] IN BLOOD BY AUTOMATED COUNT: 40 FL (ref 35.9–50)
GLOBULIN SER CALC-MCNC: 2.8 G/DL (ref 1.9–3.5)
GLUCOSE SERPL-MCNC: 78 MG/DL (ref 65–99)
HCT VFR BLD AUTO: 45.1 % (ref 42–52)
HGB BLD-MCNC: 15.6 G/DL (ref 14–18)
IMM GRANULOCYTES # BLD AUTO: 0.04 K/UL (ref 0–0.11)
IMM GRANULOCYTES NFR BLD AUTO: 1.1 % (ref 0–0.9)
LYMPHOCYTES # BLD AUTO: 1.52 K/UL (ref 1–4.8)
LYMPHOCYTES NFR BLD: 41 % (ref 22–41)
MCH RBC QN AUTO: 29.3 PG (ref 27–33)
MCHC RBC AUTO-ENTMCNC: 34.6 G/DL (ref 33.7–35.3)
MCV RBC AUTO: 84.6 FL (ref 81.4–97.8)
MONOCYTES # BLD AUTO: 0.32 K/UL (ref 0–0.85)
MONOCYTES NFR BLD AUTO: 8.6 % (ref 0–13.4)
NEUTROPHILS # BLD AUTO: 1.68 K/UL (ref 1.82–7.42)
NEUTROPHILS NFR BLD: 45.3 % (ref 44–72)
NRBC # BLD AUTO: 0 K/UL
NRBC BLD-RTO: 0 /100 WBC
PLATELET # BLD AUTO: 253 K/UL (ref 164–446)
PMV BLD AUTO: 9.1 FL (ref 9–12.9)
POTASSIUM SERPL-SCNC: 3.8 MMOL/L (ref 3.6–5.5)
PROT SERPL-MCNC: 7.4 G/DL (ref 6–8.2)
RBC # BLD AUTO: 5.33 M/UL (ref 4.7–6.1)
SODIUM SERPL-SCNC: 142 MMOL/L (ref 135–145)
WBC # BLD AUTO: 3.7 K/UL (ref 4.8–10.8)

## 2019-02-26 PROCEDURE — 36415 COLL VENOUS BLD VENIPUNCTURE: CPT

## 2019-02-26 PROCEDURE — 85025 COMPLETE CBC W/AUTO DIFF WBC: CPT

## 2019-02-26 PROCEDURE — 80053 COMPREHEN METABOLIC PANEL: CPT

## 2019-02-26 PROCEDURE — 82550 ASSAY OF CK (CPK): CPT

## 2019-03-29 ENCOUNTER — HOSPITAL ENCOUNTER (OUTPATIENT)
Dept: RADIOLOGY | Facility: MEDICAL CENTER | Age: 24
End: 2019-03-29
Attending: INTERNAL MEDICINE
Payer: COMMERCIAL

## 2019-03-29 DIAGNOSIS — C34.92 SQUAMOUS CARCINOMA OF LUNG, LEFT (HCC): ICD-10-CM

## 2019-03-29 PROCEDURE — 71260 CT THORAX DX C+: CPT

## 2019-03-29 PROCEDURE — 700117 HCHG RX CONTRAST REV CODE 255: Performed by: INTERNAL MEDICINE

## 2019-03-29 RX ADMIN — IOHEXOL 100 ML: 350 INJECTION, SOLUTION INTRAVENOUS at 12:39

## 2019-03-29 RX ADMIN — IOHEXOL 50 ML: 240 INJECTION, SOLUTION INTRATHECAL; INTRAVASCULAR; INTRAVENOUS; ORAL at 12:40

## 2019-04-01 ENCOUNTER — HOSPITAL ENCOUNTER (OUTPATIENT)
Dept: LAB | Facility: MEDICAL CENTER | Age: 24
End: 2019-04-01
Attending: INTERNAL MEDICINE
Payer: COMMERCIAL

## 2019-04-01 LAB
ALBUMIN SERPL BCP-MCNC: 4.4 G/DL (ref 3.2–4.9)
ALBUMIN/GLOB SERPL: 1.8 G/DL
ALP SERPL-CCNC: 89 U/L (ref 30–99)
ALT SERPL-CCNC: 28 U/L (ref 2–50)
ANION GAP SERPL CALC-SCNC: 5 MMOL/L (ref 0–11.9)
AST SERPL-CCNC: 25 U/L (ref 12–45)
BASOPHILS # BLD AUTO: 0.9 % (ref 0–1.8)
BASOPHILS # BLD: 0.04 K/UL (ref 0–0.12)
BILIRUB SERPL-MCNC: 0.8 MG/DL (ref 0.1–1.5)
BUN SERPL-MCNC: 17 MG/DL (ref 8–22)
CALCIUM SERPL-MCNC: 9.4 MG/DL (ref 8.5–10.5)
CHLORIDE SERPL-SCNC: 106 MMOL/L (ref 96–112)
CK SERPL-CCNC: 213 U/L (ref 0–154)
CO2 SERPL-SCNC: 27 MMOL/L (ref 20–33)
CREAT SERPL-MCNC: 1.03 MG/DL (ref 0.5–1.4)
EOSINOPHIL # BLD AUTO: 0.17 K/UL (ref 0–0.51)
EOSINOPHIL NFR BLD: 3.9 % (ref 0–6.9)
ERYTHROCYTE [DISTWIDTH] IN BLOOD BY AUTOMATED COUNT: 38.3 FL (ref 35.9–50)
FASTING STATUS PATIENT QL REPORTED: NORMAL
GLOBULIN SER CALC-MCNC: 2.5 G/DL (ref 1.9–3.5)
GLUCOSE SERPL-MCNC: 90 MG/DL (ref 65–99)
HCT VFR BLD AUTO: 44.5 % (ref 42–52)
HGB BLD-MCNC: 15.2 G/DL (ref 14–18)
IMM GRANULOCYTES # BLD AUTO: 0.05 K/UL (ref 0–0.11)
IMM GRANULOCYTES NFR BLD AUTO: 1.1 % (ref 0–0.9)
LYMPHOCYTES # BLD AUTO: 1.77 K/UL (ref 1–4.8)
LYMPHOCYTES NFR BLD: 40.7 % (ref 22–41)
MCH RBC QN AUTO: 29.3 PG (ref 27–33)
MCHC RBC AUTO-ENTMCNC: 34.2 G/DL (ref 33.7–35.3)
MCV RBC AUTO: 85.7 FL (ref 81.4–97.8)
MONOCYTES # BLD AUTO: 0.44 K/UL (ref 0–0.85)
MONOCYTES NFR BLD AUTO: 10.1 % (ref 0–13.4)
NEUTROPHILS # BLD AUTO: 1.88 K/UL (ref 1.82–7.42)
NEUTROPHILS NFR BLD: 43.3 % (ref 44–72)
NRBC # BLD AUTO: 0 K/UL
NRBC BLD-RTO: 0 /100 WBC
PLATELET # BLD AUTO: 269 K/UL (ref 164–446)
PMV BLD AUTO: 9 FL (ref 9–12.9)
POTASSIUM SERPL-SCNC: 3.9 MMOL/L (ref 3.6–5.5)
PROT SERPL-MCNC: 6.9 G/DL (ref 6–8.2)
RBC # BLD AUTO: 5.19 M/UL (ref 4.7–6.1)
SODIUM SERPL-SCNC: 138 MMOL/L (ref 135–145)
WBC # BLD AUTO: 4.4 K/UL (ref 4.8–10.8)

## 2019-04-01 PROCEDURE — 36415 COLL VENOUS BLD VENIPUNCTURE: CPT

## 2019-04-01 PROCEDURE — 80053 COMPREHEN METABOLIC PANEL: CPT

## 2019-04-01 PROCEDURE — 85025 COMPLETE CBC W/AUTO DIFF WBC: CPT

## 2019-04-01 PROCEDURE — 82550 ASSAY OF CK (CPK): CPT

## 2019-04-05 ENCOUNTER — HOSPITAL ENCOUNTER (OUTPATIENT)
Dept: RADIOLOGY | Facility: MEDICAL CENTER | Age: 24
End: 2019-04-05
Attending: RADIOLOGY
Payer: COMMERCIAL

## 2019-04-05 DIAGNOSIS — C79.31 BRAIN METASTASES: ICD-10-CM

## 2019-04-05 PROCEDURE — 70553 MRI BRAIN STEM W/O & W/DYE: CPT

## 2019-04-05 PROCEDURE — 700117 HCHG RX CONTRAST REV CODE 255: Performed by: RADIOLOGY

## 2019-04-05 PROCEDURE — A9585 GADOBUTROL INJECTION: HCPCS | Performed by: RADIOLOGY

## 2019-04-05 RX ORDER — GADOBUTROL 604.72 MG/ML
7.5 INJECTION INTRAVENOUS ONCE
Status: COMPLETED | OUTPATIENT
Start: 2019-04-05 | End: 2019-04-05

## 2019-04-05 RX ADMIN — GADOBUTROL 7.5 ML: 604.72 INJECTION INTRAVENOUS at 14:54

## 2019-04-08 ENCOUNTER — HOSPITAL ENCOUNTER (OUTPATIENT)
Dept: RADIATION ONCOLOGY | Facility: MEDICAL CENTER | Age: 24
End: 2019-04-30
Attending: RADIOLOGY
Payer: COMMERCIAL

## 2019-04-08 VITALS
DIASTOLIC BLOOD PRESSURE: 81 MMHG | BODY MASS INDEX: 26.18 KG/M2 | HEIGHT: 72 IN | HEART RATE: 67 BPM | WEIGHT: 193.3 LBS | TEMPERATURE: 98.5 F | SYSTOLIC BLOOD PRESSURE: 139 MMHG

## 2019-04-08 DIAGNOSIS — C79.31 BRAIN METASTASES: ICD-10-CM

## 2019-04-08 PROCEDURE — 99212 OFFICE O/P EST SF 10 MIN: CPT | Performed by: RADIOLOGY

## 2019-04-08 PROCEDURE — 99215 OFFICE O/P EST HI 40 MIN: CPT | Mod: 24 | Performed by: RADIOLOGY

## 2019-04-08 RX ORDER — OXYCODONE HYDROCHLORIDE 5 MG/1
5 TABLET ORAL EVERY 4 HOURS PRN
COMMUNITY
End: 2019-11-27

## 2019-04-08 ASSESSMENT — PAIN SCALES - GENERAL: PAINLEVEL: NO PAIN

## 2019-04-08 NOTE — PROGRESS NOTES
RADIATION ONCOLOGY FOLLOW-UP    DATE OF SERVICE: 4/8/2019    IDENTIFICATION:   A 24 y.o. 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  Stereotactic radio therapy to the right frontal excised brain metastasis and left cerebellar metastasis complete 1/10/2019      HISTORY OF PRESENT ILLNESS:   Since last seen patient is really been doing well he did have recent scans including an MRI scan of the brain on 4/5/2019.  This showed resolving postoperative hemorrhagic cavity in the right frontal lobe with near complete resolution of surrounding vasogenic there is a new 11 mm oblong lesion in the deep white matter consistent with a new metastatic deposit.  This is anterior to the prior resected lesion.  There is a resolution of the 11 mm metastatic deposit in the far lateral aspect of the left cerebellar hemisphere.  He is also had a CT scan of the chest abdomen pelvis that showed regression of the left hilar mass now measuring 2.7 x 1 5 and near complete resolution of the previously seen ill-defined density within the left upper lobe now measuring only 5 mm.  There is new to increased prominent of ill-defined groundglass nodular opacities within the left upper and lower lobe measuring up to 5 mm in size.  This is either inflammatory or areas of metastatic disease.  There is stable right upper lobe hazy groundglass nodule measuring 11 mm.  No evidence of metastatic disease in the abdomen and pelvis.  Currently he is doing well he is essentially asymptomatic.    CURRENT MEDICATIONS:  Current Outpatient Prescriptions   Medication Sig Dispense Refill   • Alectinib HCl (ALECENSA PO) Take 600 mg by mouth 2 Times a Day.     • oxyCODONE immediate-release (ROXICODONE) 5 MG Tab Take 5 mg by mouth every four hours as needed for Severe Pain.     • dexamethasone (DECADRON) 1 MG Tab Take 1 Tab by mouth every 8 hours. (Patient not taking: Reported on 2/22/2019) 10 Tab 0   • levETIRAcetam (KEPPRA) 500 MG Tab Take 1 Tab by mouth 2  Times a Day. (Patient not taking: Reported on 2/22/2019) 60 Tab 0   • raNITidine 15 mg/mL (ZANTAC) Syrup Take 75 mg by mouth 2 Times a Day.     • meloxicam (MOBIC) 15 MG tablet Take 15 mg by mouth as needed (For headaches).       No current facility-administered medications for this encounter.        ALLERGIES:  Patient has no known allergies.    FAMILY HISTORY:    Family History   Problem Relation Age of Onset   • Heart Disease Mother    • Rheumatologic Disease Mother         aortitis   • Rheumatologic Disease Father    • Diabetes Maternal Grandfather    • Cancer Maternal Grandfather    • Stroke Maternal Grandmother    [unfilled]        SOCIAL HISTORY:     reports that he has never smoked. He has never used smokeless tobacco. He reports that he drinks alcohol. He reports that he does not use drugs.        REVIEW OF SYSTEMS: Is significant for 20 pound weight gain, fatigue, blurred vision headaches which are improved.  The rest of the review of systems has been reviewed by me and is documented in the nursing note in [facility] dated 4/8/2019    PHYSICAL EXAM:     ECOG PERFORMANCE STATUS:  0= Fully active, able to carry on all pre-disease performance without restriction.       Vitals:    04/08/19 1429   BP: 139/81   BP Location: Left arm   Patient Position: Sitting   BP Cuff Size: Adult   Pulse: 67   Temp: 36.9 °C (98.5 °F)   TempSrc: Temporal   Weight: 87.7 kg (193 lb 4.8 oz)   Height: 1.829 m (6')   Pain Score: No pain        GENERAL: Well-appearing alert and oriented x3 in no apparent distress  HEENT:  Pupils are equal, round, and reactive to light.  Extraocular muscles   are intact. Sclerae nonicteric.  Conjunctivae pink.  Oral cavity, tongue   protrudes midline.   NECK:  Supple without evidence of thyromegaly.  NODES:  No peripheral adenopathy of the neck, supraclavicular fossa or axillae   bilaterally.  LUNGS:  Clear to ascultation   HEART:  Regular rate and rhythm.  No murmur appreciated  ABDOMEN:  Soft. No  evidence of hepatosplenomegaly.  Positive bowel sounds.  EXTREMITIES:  Without Edema.  NEUROLOGIC:  Cranial nerves II through XII were intact. Normal stance and gait motor and sensory grossly within normal limits          IMPRESSION:  A 24 y.o. 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  Stereotactic radio therapy to the right frontal excised brain metastasis and left cerebellar metastasis complete 1/10/2019.  Now with a new metastasis anterior to the resected mass in the right frontal region.  Continued regression of the primary tumors but persistent abnormalities in the lung consistent with either infection or metastatic disease.    RECOMMENDATIONS:   I had a long discussion with the patient and his mother and I think it is reasonable to treat the new brain metastasis with stereotactic radiosurgery.  He is going to be presented at the lung tumor board at [facility] later this week.  I think the general recommendation will probably be to treat the solitary metastasis however we will await their final recommendations before embarking on treatment.  We have him tentatively scheduled for stereotactic radiosurgery scan and radiation set up on April 15.  We would start treatment on the following Monday.  Again we discussed the details of the radiation therapy along with the side effects both acute and chronic and they understand.          Thank you for the opportunity to participate in his care.  If any questions or comments, please do not hesitate in calling.      Please note that this dictation was created using voice recognition software. I have made every reasonable attempt to correct obvious errors, but I expect that there are errors of grammar and possibly content that I did not discover before finalizing the note.

## 2019-04-08 NOTE — NON-PROVIDER
Patient was seen today in clinic with Dr. Davis for Follow up.  Vitals signs and weight were obtained and pain assessment was completed.  Allergies and medications were reviewed with the patient.  Review of systems completed.     Vitals/Pain:  Vitals:    04/08/19 1429   BP: 139/81   BP Location: Left arm   Patient Position: Sitting   BP Cuff Size: Adult   Pulse: 67   Temp: 36.9 °C (98.5 °F)   TempSrc: Temporal   Weight: 87.7 kg (193 lb 4.8 oz)   Height: 1.829 m (6')   Pain Score: No pain        Allergies:   Patient has no known allergies.    Current Medications:  Current Outpatient Prescriptions   Medication Sig Dispense Refill   • Alectinib HCl (ALECENSA PO) Take 600 mg by mouth 2 Times a Day.     • oxyCODONE immediate-release (ROXICODONE) 5 MG Tab Take 5 mg by mouth every four hours as needed for Severe Pain.     • dexamethasone (DECADRON) 1 MG Tab Take 1 Tab by mouth every 8 hours. (Patient not taking: Reported on 2/22/2019) 10 Tab 0   • levETIRAcetam (KEPPRA) 500 MG Tab Take 1 Tab by mouth 2 Times a Day. (Patient not taking: Reported on 2/22/2019) 60 Tab 0   • raNITidine 15 mg/mL (ZANTAC) Syrup Take 75 mg by mouth 2 Times a Day.     • meloxicam (MOBIC) 15 MG tablet Take 15 mg by mouth as needed (For headaches).       No current facility-administered medications for this encounter.          PCP:  Heath Buenrostro, Med [facility]

## 2019-04-12 ENCOUNTER — APPOINTMENT (OUTPATIENT)
Dept: RADIOLOGY | Facility: MEDICAL CENTER | Age: 24
End: 2019-04-12
Attending: RADIOLOGY
Payer: COMMERCIAL

## 2019-04-15 ENCOUNTER — HOSPITAL ENCOUNTER (OUTPATIENT)
Dept: RADIATION ONCOLOGY | Facility: MEDICAL CENTER | Age: 24
End: 2019-04-15

## 2019-04-15 ENCOUNTER — APPOINTMENT (OUTPATIENT)
Dept: RADIOLOGY | Facility: MEDICAL CENTER | Age: 24
End: 2019-04-15
Attending: RADIOLOGY
Payer: COMMERCIAL

## 2019-04-15 DIAGNOSIS — C79.31 BRAIN METASTASES: ICD-10-CM

## 2019-04-15 PROCEDURE — 77470 SPECIAL RADIATION TREATMENT: CPT | Performed by: RADIOLOGY

## 2019-04-15 PROCEDURE — 77290 THER RAD SIMULAJ FIELD CPLX: CPT | Mod: 26 | Performed by: RADIOLOGY

## 2019-04-15 PROCEDURE — 77263 THER RADIOLOGY TX PLNG CPLX: CPT | Performed by: RADIOLOGY

## 2019-04-15 PROCEDURE — 700117 HCHG RX CONTRAST REV CODE 255: Performed by: RADIOLOGY

## 2019-04-15 PROCEDURE — 77470 SPECIAL RADIATION TREATMENT: CPT | Mod: 26 | Performed by: RADIOLOGY

## 2019-04-15 PROCEDURE — A9585 GADOBUTROL INJECTION: HCPCS | Performed by: RADIOLOGY

## 2019-04-15 PROCEDURE — 77290 THER RAD SIMULAJ FIELD CPLX: CPT | Performed by: RADIOLOGY

## 2019-04-15 PROCEDURE — 70553 MRI BRAIN STEM W/O & W/DYE: CPT

## 2019-04-15 PROCEDURE — 77334 RADIATION TREATMENT AID(S): CPT | Performed by: RADIOLOGY

## 2019-04-15 PROCEDURE — 77334 RADIATION TREATMENT AID(S): CPT | Mod: 26 | Performed by: RADIOLOGY

## 2019-04-15 RX ORDER — GADOBUTROL 604.72 MG/ML
7.5 INJECTION INTRAVENOUS ONCE
Status: COMPLETED | OUTPATIENT
Start: 2019-04-15 | End: 2019-04-15

## 2019-04-15 RX ADMIN — GADOBUTROL 7.5 ML: 604.72 INJECTION INTRAVENOUS at 09:12

## 2019-04-17 PROCEDURE — 77370 RADIATION PHYSICS CONSULT: CPT | Performed by: RADIOLOGY

## 2019-04-18 PROCEDURE — 77334 RADIATION TREATMENT AID(S): CPT | Mod: 26 | Performed by: RADIOLOGY

## 2019-04-18 PROCEDURE — 77295 3-D RADIOTHERAPY PLAN: CPT | Performed by: RADIOLOGY

## 2019-04-18 PROCEDURE — 77300 RADIATION THERAPY DOSE PLAN: CPT | Performed by: RADIOLOGY

## 2019-04-18 PROCEDURE — 77295 3-D RADIOTHERAPY PLAN: CPT | Mod: 26 | Performed by: RADIOLOGY

## 2019-04-18 PROCEDURE — 77300 RADIATION THERAPY DOSE PLAN: CPT | Mod: 26 | Performed by: RADIOLOGY

## 2019-04-18 PROCEDURE — 77334 RADIATION TREATMENT AID(S): CPT | Performed by: RADIOLOGY

## 2019-04-23 ENCOUNTER — HOSPITAL ENCOUNTER (OUTPATIENT)
Dept: RADIATION ONCOLOGY | Facility: MEDICAL CENTER | Age: 24
End: 2019-04-23

## 2019-04-23 LAB
CHEMOTHERAPY INFUSION START DATE: NORMAL
CHEMOTHERAPY RECORDS: 20
CHEMOTHERAPY RECORDS: 2000
CHEMOTHERAPY RECORDS: NORMAL
CHEMOTHERAPY RX CANCER: NORMAL
RAD ONC ARIA COURSE TREATMENT ELAPSED DAYS: NORMAL
RAD ONC ARIA PLAN TREATMENT DATES: NORMAL
RAD ONC ARIA REFERENCE POINT DOSAGE GIVEN TO DATE: 20
RAD ONC ARIA REFERENCE POINT DOSAGE GIVEN TO DATE: 24.59
RAD ONC ARIA REFERENCE POINT ID: NORMAL
RAD ONC ARIA REFERENCE POINT ID: NORMAL
RAD ONC ARIA REFERENCE POINT SESSION DOSAGE GIVEN: 20
RAD ONC ARIA REFERENCE POINT SESSION DOSAGE GIVEN: 24.59

## 2019-04-23 PROCEDURE — 77372 SRS LINEAR BASED: CPT | Performed by: RADIOLOGY

## 2019-04-23 PROCEDURE — 77280 THER RAD SIMULAJ FIELD SMPL: CPT | Performed by: RADIOLOGY

## 2019-04-23 PROCEDURE — 77336 RADIATION PHYSICS CONSULT: CPT | Performed by: RADIOLOGY

## 2019-04-23 PROCEDURE — 77432 STEREOTACTIC RADIATION TRMT: CPT | Performed by: RADIOLOGY

## 2019-04-23 PROCEDURE — 77280 THER RAD SIMULAJ FIELD SMPL: CPT | Mod: 26 | Performed by: RADIOLOGY

## 2019-04-24 LAB
CHEMOTHERAPY INFUSION START DATE: NORMAL
CHEMOTHERAPY RECORDS: 20
CHEMOTHERAPY RECORDS: 2000
CHEMOTHERAPY RECORDS: NORMAL
CHEMOTHERAPY RX CANCER: NORMAL
RAD ONC ARIA COURSE TREATMENT ELAPSED DAYS: NORMAL
RAD ONC ARIA PLAN TREATMENT DATES: NORMAL
RAD ONC ARIA REFERENCE POINT DOSAGE GIVEN TO DATE: 20
RAD ONC ARIA REFERENCE POINT DOSAGE GIVEN TO DATE: 24.59
RAD ONC ARIA REFERENCE POINT ID: NORMAL
RAD ONC ARIA REFERENCE POINT ID: NORMAL

## 2019-05-03 ENCOUNTER — HOSPITAL ENCOUNTER (OUTPATIENT)
Dept: LAB | Facility: MEDICAL CENTER | Age: 24
End: 2019-05-03
Attending: INTERNAL MEDICINE
Payer: COMMERCIAL

## 2019-05-03 LAB
ALBUMIN SERPL BCP-MCNC: 4.7 G/DL (ref 3.2–4.9)
ALBUMIN/GLOB SERPL: 1.7 G/DL
ALP SERPL-CCNC: 94 U/L (ref 30–99)
ALT SERPL-CCNC: 19 U/L (ref 2–50)
ANION GAP SERPL CALC-SCNC: 9 MMOL/L (ref 0–11.9)
AST SERPL-CCNC: 19 U/L (ref 12–45)
BASOPHILS # BLD AUTO: 0.5 % (ref 0–1.8)
BASOPHILS # BLD: 0.02 K/UL (ref 0–0.12)
BILIRUB SERPL-MCNC: 0.9 MG/DL (ref 0.1–1.5)
BUN SERPL-MCNC: 21 MG/DL (ref 8–22)
CALCIUM SERPL-MCNC: 9.6 MG/DL (ref 8.5–10.5)
CHLORIDE SERPL-SCNC: 103 MMOL/L (ref 96–112)
CK SERPL-CCNC: 95 U/L (ref 0–154)
CO2 SERPL-SCNC: 27 MMOL/L (ref 20–33)
CREAT SERPL-MCNC: 1.13 MG/DL (ref 0.5–1.4)
EOSINOPHIL # BLD AUTO: 0.12 K/UL (ref 0–0.51)
EOSINOPHIL NFR BLD: 3.2 % (ref 0–6.9)
ERYTHROCYTE [DISTWIDTH] IN BLOOD BY AUTOMATED COUNT: 37.8 FL (ref 35.9–50)
GLOBULIN SER CALC-MCNC: 2.7 G/DL (ref 1.9–3.5)
GLUCOSE SERPL-MCNC: 112 MG/DL (ref 65–99)
HCT VFR BLD AUTO: 45.9 % (ref 42–52)
HGB BLD-MCNC: 15.6 G/DL (ref 14–18)
IMM GRANULOCYTES # BLD AUTO: 0.02 K/UL (ref 0–0.11)
IMM GRANULOCYTES NFR BLD AUTO: 0.5 % (ref 0–0.9)
LYMPHOCYTES # BLD AUTO: 1.5 K/UL (ref 1–4.8)
LYMPHOCYTES NFR BLD: 40.3 % (ref 22–41)
MCH RBC QN AUTO: 29.2 PG (ref 27–33)
MCHC RBC AUTO-ENTMCNC: 34 G/DL (ref 33.7–35.3)
MCV RBC AUTO: 85.8 FL (ref 81.4–97.8)
MONOCYTES # BLD AUTO: 0.29 K/UL (ref 0–0.85)
MONOCYTES NFR BLD AUTO: 7.8 % (ref 0–13.4)
NEUTROPHILS # BLD AUTO: 1.77 K/UL (ref 1.82–7.42)
NEUTROPHILS NFR BLD: 47.7 % (ref 44–72)
NRBC # BLD AUTO: 0 K/UL
NRBC BLD-RTO: 0 /100 WBC
PLATELET # BLD AUTO: 256 K/UL (ref 164–446)
PMV BLD AUTO: 8.8 FL (ref 9–12.9)
POTASSIUM SERPL-SCNC: 3.7 MMOL/L (ref 3.6–5.5)
PROT SERPL-MCNC: 7.4 G/DL (ref 6–8.2)
RBC # BLD AUTO: 5.35 M/UL (ref 4.7–6.1)
SODIUM SERPL-SCNC: 139 MMOL/L (ref 135–145)
WBC # BLD AUTO: 3.7 K/UL (ref 4.8–10.8)

## 2019-05-03 PROCEDURE — 36415 COLL VENOUS BLD VENIPUNCTURE: CPT

## 2019-05-03 PROCEDURE — 82550 ASSAY OF CK (CPK): CPT

## 2019-05-03 PROCEDURE — 80053 COMPREHEN METABOLIC PANEL: CPT

## 2019-05-03 PROCEDURE — 85025 COMPLETE CBC W/AUTO DIFF WBC: CPT

## 2019-06-03 ENCOUNTER — HOSPITAL ENCOUNTER (OUTPATIENT)
Dept: RADIATION ONCOLOGY | Facility: MEDICAL CENTER | Age: 24
End: 2019-06-30
Attending: RADIOLOGY
Payer: COMMERCIAL

## 2019-06-03 VITALS
TEMPERATURE: 98.2 F | SYSTOLIC BLOOD PRESSURE: 125 MMHG | HEART RATE: 70 BPM | WEIGHT: 192 LBS | HEIGHT: 71 IN | BODY MASS INDEX: 26.88 KG/M2 | OXYGEN SATURATION: 97 % | DIASTOLIC BLOOD PRESSURE: 87 MMHG

## 2019-06-03 DIAGNOSIS — G93.9 LESION OF RIGHT FRONTAL LOBE OF BRAIN: ICD-10-CM

## 2019-06-03 PROCEDURE — 99212 OFFICE O/P EST SF 10 MIN: CPT | Performed by: RADIOLOGY

## 2019-06-03 ASSESSMENT — PAIN SCALES - GENERAL: PAINLEVEL: NO PAIN

## 2019-06-03 NOTE — PROGRESS NOTES
RADIATION ONCOLOGY FOLLOW-UP    DATE OF SERVICE: 6/3/2019    IDENTIFICATION:   A 24 y.o. 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 Stereotactic radio therapy to the right frontal excised brain metastasis and left cerebellar metastasis complete 1/10/2019.  Now status post radiation therapy to an additional right frontal lobe lesion complete 4/23/2019       HISTORY OF PRESENT ILLNESS:   Since last seen patient has been doing well he had no hair loss he does have headaches all last week but he has not had any today.  He is not taking any Decadron.  He is on Alecensa and his next set of scans will be the end of June with Dr. Castorena.  Otherwise he is doing well without any new complaints.    CURRENT MEDICATIONS:  Current Outpatient Prescriptions   Medication Sig Dispense Refill   • Alectinib HCl (ALECENSA PO) Take 600 mg by mouth 2 Times a Day.     • oxyCODONE immediate-release (ROXICODONE) 5 MG Tab Take 5 mg by mouth every four hours as needed for Severe Pain.     • dexamethasone (DECADRON) 1 MG Tab Take 1 Tab by mouth every 8 hours. (Patient not taking: Reported on 2/22/2019) 10 Tab 0   • levETIRAcetam (KEPPRA) 500 MG Tab Take 1 Tab by mouth 2 Times a Day. (Patient not taking: Reported on 2/22/2019) 60 Tab 0   • raNITidine 15 mg/mL (ZANTAC) Syrup Take 75 mg by mouth 2 Times a Day.     • meloxicam (MOBIC) 15 MG tablet Take 15 mg by mouth as needed (For headaches).       No current facility-administered medications for this encounter.        ALLERGIES:  Patient has no known allergies.    FAMILY HISTORY:    Family History   Problem Relation Age of Onset   • Heart Disease Mother    • Rheumatologic Disease Mother         aortitis   • Rheumatologic Disease Father    • Diabetes Maternal Grandfather    • Cancer Maternal Grandfather    • Stroke Maternal Grandmother    [unfilled]        SOCIAL HISTORY:     reports that he has never smoked. He has never used smokeless tobacco. He reports that he drinks alcohol. He  "reports that he does not use drugs.        REVIEW OF SYSTEMS: Is significant for that mentioned in the HPI.  The rest of the review of systems has been reviewed by me and is documented in the nursing note in [facility] dated 4/8/2019    PHYSICAL EXAM:     ECOG PERFORMANCE STATUS:  0= Fully active, able to carry on all pre-disease performance without restriction.       Vitals:    06/03/19 1429   BP: 125/87   BP Location: Right arm   Patient Position: Sitting   BP Cuff Size: Adult   Pulse: 70   Temp: 36.8 °C (98.2 °F)   TempSrc: Temporal   SpO2: 97%   Weight: 87.1 kg (192 lb)   Height: 1.803 m (5' 11\")   Pain Score: No pain        GENERAL: Well-appearing alert and oriented x3 in no apparent distress  HEENT:  Pupils are equal, round, and reactive to light.  Extraocular muscles   are intact. Sclerae nonicteric.  Conjunctivae pink.  Oral cavity, tongue   protrudes midline.   NECK:  Supple without evidence of thyromegaly.  NODES:  No peripheral adenopathy of the neck, supraclavicular fossa or axillae   bilaterally.  LUNGS:  Clear to ascultation   HEART:  Regular rate and rhythm.  No murmur appreciated  ABDOMEN:  Soft. No evidence of hepatosplenomegaly.  Positive bowel sounds.  EXTREMITIES:  Without Edema.  NEUROLOGIC:  Cranial nerves II through XII were intact. Normal stance and gait motor and sensory grossly within normal limits          IMPRESSION:    A 24 y.o. 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 Stereotactic radio therapy to the right frontal excised brain metastasis and left cerebellar metastasis complete 1/10/2019 , now most recently status post radiation therapy to an additional right frontal lobe lesion complete 4/23/2019.      RECOMMENDATIONS:   I am going to repeat an MRI scan of his brain the end of July and I will see him to see him sooner as needed.        Thank you for the opportunity to participate in his care.  If any questions or comments, please do not hesitate in calling.      Please note " that this dictation was created using voice recognition software. I have made every reasonable attempt to correct obvious errors, but I expect that there are errors of grammar and possibly content that I did not discover before finalizing the note.

## 2019-06-03 NOTE — NON-PROVIDER
"Patient was seen today in clinic with Dr. Davis for follow up.  Vitals signs and weight were obtained and pain assessment was completed.  Allergies and medications were reviewed with the patient.  Toxicities of treatment assessed.     Vitals/Pain:  Vitals:    06/03/19 1429   BP: 125/87   BP Location: Right arm   Patient Position: Sitting   BP Cuff Size: Adult   Pulse: 70   Temp: 36.8 °C (98.2 °F)   TempSrc: Temporal   SpO2: 97%   Weight: 87.1 kg (192 lb)   Height: 1.803 m (5' 11\")   Pain Score: No pain        Allergies:   Patient has no known allergies.    Current Medications:  Current Outpatient Prescriptions   Medication Sig Dispense Refill   • Alectinib HCl (ALECENSA PO) Take 600 mg by mouth 2 Times a Day.     • oxyCODONE immediate-release (ROXICODONE) 5 MG Tab Take 5 mg by mouth every four hours as needed for Severe Pain.     • dexamethasone (DECADRON) 1 MG Tab Take 1 Tab by mouth every 8 hours. (Patient not taking: Reported on 2/22/2019) 10 Tab 0   • levETIRAcetam (KEPPRA) 500 MG Tab Take 1 Tab by mouth 2 Times a Day. (Patient not taking: Reported on 2/22/2019) 60 Tab 0   • raNITidine 15 mg/mL (ZANTAC) Syrup Take 75 mg by mouth 2 Times a Day.     • meloxicam (MOBIC) 15 MG tablet Take 15 mg by mouth as needed (For headaches).       No current facility-administered medications for this encounter.          PCP:  Heath Buenrostro, Jerzy Ass't    "

## 2019-06-14 ENCOUNTER — HOSPITAL ENCOUNTER (OUTPATIENT)
Dept: LAB | Facility: MEDICAL CENTER | Age: 24
End: 2019-06-14
Attending: INTERNAL MEDICINE
Payer: COMMERCIAL

## 2019-06-14 LAB
BASOPHILS # BLD AUTO: 0.6 % (ref 0–1.8)
BASOPHILS # BLD: 0.02 K/UL (ref 0–0.12)
EOSINOPHIL # BLD AUTO: 0.16 K/UL (ref 0–0.51)
EOSINOPHIL NFR BLD: 4.4 % (ref 0–6.9)
ERYTHROCYTE [DISTWIDTH] IN BLOOD BY AUTOMATED COUNT: 36.9 FL (ref 35.9–50)
HCT VFR BLD AUTO: 44.7 % (ref 42–52)
HGB BLD-MCNC: 15.6 G/DL (ref 14–18)
IMM GRANULOCYTES # BLD AUTO: 0.05 K/UL (ref 0–0.11)
IMM GRANULOCYTES NFR BLD AUTO: 1.4 % (ref 0–0.9)
LYMPHOCYTES # BLD AUTO: 1.55 K/UL (ref 1–4.8)
LYMPHOCYTES NFR BLD: 42.7 % (ref 22–41)
MCH RBC QN AUTO: 29.5 PG (ref 27–33)
MCHC RBC AUTO-ENTMCNC: 34.9 G/DL (ref 33.7–35.3)
MCV RBC AUTO: 84.7 FL (ref 81.4–97.8)
MONOCYTES # BLD AUTO: 0.32 K/UL (ref 0–0.85)
MONOCYTES NFR BLD AUTO: 8.8 % (ref 0–13.4)
NEUTROPHILS # BLD AUTO: 1.53 K/UL (ref 1.82–7.42)
NEUTROPHILS NFR BLD: 42.1 % (ref 44–72)
NRBC # BLD AUTO: 0 K/UL
NRBC BLD-RTO: 0 /100 WBC
PLATELET # BLD AUTO: 281 K/UL (ref 164–446)
PMV BLD AUTO: 9.4 FL (ref 9–12.9)
RBC # BLD AUTO: 5.28 M/UL (ref 4.7–6.1)
WBC # BLD AUTO: 3.6 K/UL (ref 4.8–10.8)

## 2019-06-14 PROCEDURE — 85025 COMPLETE CBC W/AUTO DIFF WBC: CPT

## 2019-06-14 PROCEDURE — 82550 ASSAY OF CK (CPK): CPT

## 2019-06-14 PROCEDURE — 80053 COMPREHEN METABOLIC PANEL: CPT

## 2019-06-14 PROCEDURE — 36415 COLL VENOUS BLD VENIPUNCTURE: CPT

## 2019-06-15 LAB
ALBUMIN SERPL BCP-MCNC: 4.9 G/DL (ref 3.2–4.9)
ALBUMIN/GLOB SERPL: 1.6 G/DL
ALP SERPL-CCNC: 97 U/L (ref 30–99)
ALT SERPL-CCNC: 38 U/L (ref 2–50)
ANION GAP SERPL CALC-SCNC: 9 MMOL/L (ref 0–11.9)
AST SERPL-CCNC: 31 U/L (ref 12–45)
BILIRUB SERPL-MCNC: 0.9 MG/DL (ref 0.1–1.5)
BUN SERPL-MCNC: 18 MG/DL (ref 8–22)
CALCIUM SERPL-MCNC: 10 MG/DL (ref 8.5–10.5)
CHLORIDE SERPL-SCNC: 104 MMOL/L (ref 96–112)
CK SERPL-CCNC: 107 U/L (ref 0–154)
CO2 SERPL-SCNC: 27 MMOL/L (ref 20–33)
CREAT SERPL-MCNC: 1.13 MG/DL (ref 0.5–1.4)
GLOBULIN SER CALC-MCNC: 3.1 G/DL (ref 1.9–3.5)
GLUCOSE SERPL-MCNC: 105 MG/DL (ref 65–99)
POTASSIUM SERPL-SCNC: 3.7 MMOL/L (ref 3.6–5.5)
PROT SERPL-MCNC: 8 G/DL (ref 6–8.2)
SODIUM SERPL-SCNC: 140 MMOL/L (ref 135–145)

## 2019-07-09 ENCOUNTER — HOSPITAL ENCOUNTER (OUTPATIENT)
Dept: CARDIOLOGY | Facility: MEDICAL CENTER | Age: 24
End: 2019-07-09
Attending: INTERNAL MEDICINE
Payer: COMMERCIAL

## 2019-07-09 ENCOUNTER — HOSPITAL ENCOUNTER (OUTPATIENT)
Dept: RADIOLOGY | Facility: MEDICAL CENTER | Age: 24
End: 2019-07-09
Attending: INTERNAL MEDICINE
Payer: COMMERCIAL

## 2019-07-09 DIAGNOSIS — C34.92 SQUAMOUS CARCINOMA OF LUNG, LEFT (HCC): ICD-10-CM

## 2019-07-09 LAB
LV EJECT FRACT  99904: 65
LV EJECT FRACT MOD 2C 99903: 59.91
LV EJECT FRACT MOD 4C 99902: 67.35
LV EJECT FRACT MOD BP 99901: 66.55

## 2019-07-09 PROCEDURE — 93306 TTE W/DOPPLER COMPLETE: CPT

## 2019-07-09 PROCEDURE — 700117 HCHG RX CONTRAST REV CODE 255: Performed by: INTERNAL MEDICINE

## 2019-07-09 PROCEDURE — 71260 CT THORAX DX C+: CPT

## 2019-07-09 PROCEDURE — 93306 TTE W/DOPPLER COMPLETE: CPT | Mod: 26 | Performed by: INTERNAL MEDICINE

## 2019-07-09 RX ADMIN — IOHEXOL 100 ML: 350 INJECTION, SOLUTION INTRAVENOUS at 10:59

## 2019-07-10 ENCOUNTER — HOSPITAL ENCOUNTER (OUTPATIENT)
Dept: LAB | Facility: MEDICAL CENTER | Age: 24
End: 2019-07-10
Attending: INTERNAL MEDICINE
Payer: COMMERCIAL

## 2019-07-10 LAB
ALBUMIN SERPL BCP-MCNC: 4.5 G/DL (ref 3.2–4.9)
ALBUMIN/GLOB SERPL: 1.4 G/DL
ALP SERPL-CCNC: 98 U/L (ref 30–99)
ALT SERPL-CCNC: 20 U/L (ref 2–50)
ANION GAP SERPL CALC-SCNC: 8 MMOL/L (ref 0–11.9)
AST SERPL-CCNC: 21 U/L (ref 12–45)
BASOPHILS # BLD AUTO: 0.7 % (ref 0–1.8)
BASOPHILS # BLD: 0.03 K/UL (ref 0–0.12)
BILIRUB SERPL-MCNC: 0.9 MG/DL (ref 0.1–1.5)
BUN SERPL-MCNC: 20 MG/DL (ref 8–22)
CALCIUM SERPL-MCNC: 10 MG/DL (ref 8.5–10.5)
CHLORIDE SERPL-SCNC: 106 MMOL/L (ref 96–112)
CK SERPL-CCNC: 121 U/L (ref 0–154)
CO2 SERPL-SCNC: 25 MMOL/L (ref 20–33)
CREAT SERPL-MCNC: 1.19 MG/DL (ref 0.5–1.4)
EOSINOPHIL # BLD AUTO: 0.19 K/UL (ref 0–0.51)
EOSINOPHIL NFR BLD: 4.5 % (ref 0–6.9)
ERYTHROCYTE [DISTWIDTH] IN BLOOD BY AUTOMATED COUNT: 38.3 FL (ref 35.9–50)
GLOBULIN SER CALC-MCNC: 3.2 G/DL (ref 1.9–3.5)
GLUCOSE SERPL-MCNC: 102 MG/DL (ref 65–99)
HCT VFR BLD AUTO: 45.5 % (ref 42–52)
HGB BLD-MCNC: 15.7 G/DL (ref 14–18)
IMM GRANULOCYTES # BLD AUTO: 0.03 K/UL (ref 0–0.11)
IMM GRANULOCYTES NFR BLD AUTO: 0.7 % (ref 0–0.9)
LYMPHOCYTES # BLD AUTO: 1.64 K/UL (ref 1–4.8)
LYMPHOCYTES NFR BLD: 38.7 % (ref 22–41)
MCH RBC QN AUTO: 29.6 PG (ref 27–33)
MCHC RBC AUTO-ENTMCNC: 34.5 G/DL (ref 33.7–35.3)
MCV RBC AUTO: 85.8 FL (ref 81.4–97.8)
MONOCYTES # BLD AUTO: 0.34 K/UL (ref 0–0.85)
MONOCYTES NFR BLD AUTO: 8 % (ref 0–13.4)
NEUTROPHILS # BLD AUTO: 2.01 K/UL (ref 1.82–7.42)
NEUTROPHILS NFR BLD: 47.4 % (ref 44–72)
NRBC # BLD AUTO: 0 K/UL
NRBC BLD-RTO: 0 /100 WBC
PLATELET # BLD AUTO: 274 K/UL (ref 164–446)
PMV BLD AUTO: 9.2 FL (ref 9–12.9)
POTASSIUM SERPL-SCNC: 3.9 MMOL/L (ref 3.6–5.5)
PROT SERPL-MCNC: 7.7 G/DL (ref 6–8.2)
RBC # BLD AUTO: 5.3 M/UL (ref 4.7–6.1)
SODIUM SERPL-SCNC: 139 MMOL/L (ref 135–145)
WBC # BLD AUTO: 4.2 K/UL (ref 4.8–10.8)

## 2019-07-10 PROCEDURE — 80053 COMPREHEN METABOLIC PANEL: CPT

## 2019-07-10 PROCEDURE — 82550 ASSAY OF CK (CPK): CPT

## 2019-07-10 PROCEDURE — 85025 COMPLETE CBC W/AUTO DIFF WBC: CPT

## 2019-07-10 PROCEDURE — 36415 COLL VENOUS BLD VENIPUNCTURE: CPT

## 2019-07-25 ENCOUNTER — HOSPITAL ENCOUNTER (OUTPATIENT)
Dept: RADIOLOGY | Facility: MEDICAL CENTER | Age: 24
End: 2019-07-25
Attending: RADIOLOGY
Payer: COMMERCIAL

## 2019-07-25 DIAGNOSIS — G93.9 LESION OF RIGHT FRONTAL LOBE OF BRAIN: ICD-10-CM

## 2019-07-25 PROCEDURE — A9585 GADOBUTROL INJECTION: HCPCS | Performed by: RADIOLOGY

## 2019-07-25 PROCEDURE — 700117 HCHG RX CONTRAST REV CODE 255: Performed by: RADIOLOGY

## 2019-07-25 PROCEDURE — 70553 MRI BRAIN STEM W/O & W/DYE: CPT

## 2019-07-25 RX ORDER — GADOBUTROL 604.72 MG/ML
7.5 INJECTION INTRAVENOUS ONCE
Status: COMPLETED | OUTPATIENT
Start: 2019-07-25 | End: 2019-07-25

## 2019-07-25 RX ADMIN — GADOBUTROL 7.5 ML: 604.72 INJECTION INTRAVENOUS at 15:34

## 2019-07-26 ENCOUNTER — HOSPITAL ENCOUNTER (OUTPATIENT)
Dept: RADIATION ONCOLOGY | Facility: MEDICAL CENTER | Age: 24
End: 2019-07-31
Attending: RADIOLOGY
Payer: COMMERCIAL

## 2019-07-26 VITALS
DIASTOLIC BLOOD PRESSURE: 82 MMHG | SYSTOLIC BLOOD PRESSURE: 133 MMHG | TEMPERATURE: 97.7 F | HEIGHT: 71 IN | HEART RATE: 62 BPM | WEIGHT: 188.9 LBS | BODY MASS INDEX: 26.44 KG/M2

## 2019-07-26 DIAGNOSIS — G93.9 LESION OF RIGHT FRONTAL LOBE OF BRAIN: ICD-10-CM

## 2019-07-26 PROCEDURE — 99213 OFFICE O/P EST LOW 20 MIN: CPT | Performed by: RADIOLOGY

## 2019-07-26 PROCEDURE — 99212 OFFICE O/P EST SF 10 MIN: CPT | Performed by: RADIOLOGY

## 2019-07-26 ASSESSMENT — PAIN SCALES - GENERAL: PAINLEVEL: NO PAIN

## 2019-07-26 NOTE — NON-PROVIDER
"Patient was seen today in clinic with Dr. Davis for follow up.  Vitals signs and weight were obtained and pain assessment was completed.  Allergies and medications were reviewed with the patient.  Review of systems completed.     Vitals/Pain:  Vitals:    07/26/19 1425   BP: 133/82   BP Location: Right arm   Patient Position: Sitting   BP Cuff Size: Adult   Pulse: 62   Temp: 36.5 °C (97.7 °F)   TempSrc: Temporal   Weight: 85.7 kg (188 lb 14.4 oz)   Height: 1.803 m (5' 11\")   Pain Score: No pain        Allergies:   Patient has no known allergies.    Current Medications:  Current Outpatient Prescriptions   Medication Sig Dispense Refill   • Alectinib HCl (ALECENSA PO) Take 600 mg by mouth 2 Times a Day.     • oxyCODONE immediate-release (ROXICODONE) 5 MG Tab Take 5 mg by mouth every four hours as needed for Severe Pain.     • dexamethasone (DECADRON) 1 MG Tab Take 1 Tab by mouth every 8 hours. (Patient not taking: Reported on 2/22/2019) 10 Tab 0   • levETIRAcetam (KEPPRA) 500 MG Tab Take 1 Tab by mouth 2 Times a Day. (Patient not taking: Reported on 2/22/2019) 60 Tab 0   • raNITidine 15 mg/mL (ZANTAC) Syrup Take 75 mg by mouth 2 Times a Day.     • meloxicam (MOBIC) 15 MG tablet Take 15 mg by mouth as needed (For headaches).       No current facility-administered medications for this encounter.          PCP:  Heath Buenrostro, Jerzy Ass't    "

## 2019-07-26 NOTE — PROGRESS NOTES
RADIATION ONCOLOGY FOLLOW-UP    DATE OF SERVICE: 7/26/2019    IDENTIFICATION:   A 24 y.o. 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 Stereotactic radio therapy to the right frontal excised brain metastasis and left cerebellar metastasis complete 1/10/2019.  Now status post radiation therapy to an additional right frontal lobe lesion complete 4/23/2019       HISTORY OF PRESENT ILLNESS:   Patient is here to review his scans he is doing well plaints.  MRI scan of the brain was done 7/25/2018.  This shows interval resolution of the most recent irradiated area in the right superior frontal white matter.  There is still postsurgical changes in the right frontal craniotomy but no evidence of recurrent or residual neoplasm.  There is also small curvilinear enhancement in the superior lateral area of the left ayse-cerebellum this was previously treated.    CURRENT MEDICATIONS:  Current Outpatient Prescriptions   Medication Sig Dispense Refill   • Alectinib HCl (ALECENSA PO) Take 600 mg by mouth 2 Times a Day.     • oxyCODONE immediate-release (ROXICODONE) 5 MG Tab Take 5 mg by mouth every four hours as needed for Severe Pain.     • dexamethasone (DECADRON) 1 MG Tab Take 1 Tab by mouth every 8 hours. (Patient not taking: Reported on 2/22/2019) 10 Tab 0   • levETIRAcetam (KEPPRA) 500 MG Tab Take 1 Tab by mouth 2 Times a Day. (Patient not taking: Reported on 2/22/2019) 60 Tab 0   • raNITidine 15 mg/mL (ZANTAC) Syrup Take 75 mg by mouth 2 Times a Day.     • meloxicam (MOBIC) 15 MG tablet Take 15 mg by mouth as needed (For headaches).       No current facility-administered medications for this encounter.        ALLERGIES:  Patient has no known allergies.    FAMILY HISTORY:    Family History   Problem Relation Age of Onset   • Heart Disease Mother    • Rheumatologic Disease Mother         aortitis   • Rheumatologic Disease Father    • Diabetes Maternal Grandfather    • Cancer Maternal Grandfather    • Stroke Maternal  "Grandmother    [unfilled]        SOCIAL HISTORY:     reports that he has never smoked. He has never used smokeless tobacco. He reports that he drinks alcohol. He reports that he does not use drugs.      REVIEW OF SYSTEMS: Is significant for nothing new  The rest of the review of systems has been reviewed by me and is documented in the nursing note in [facility] dated 7/26/2018    PHYSICAL EXAM:     ECOG PERFORMANCE STATUS:  0= Fully active, able to carry on all pre-disease performance without restriction.       Vitals:    07/26/19 1425   BP: 133/82   BP Location: Right arm   Patient Position: Sitting   BP Cuff Size: Adult   Pulse: 62   Temp: 36.5 °C (97.7 °F)   TempSrc: Temporal   Weight: 85.7 kg (188 lb 14.4 oz)   Height: 1.803 m (5' 11\")   Pain Score: No pain        GENERAL: Well-appearing alert and oriented x3 in no apparent distress  HEENT:  Pupils are equal, round, and reactive to light.  Extraocular muscles   are intact. Sclerae nonicteric.  Conjunctivae pink.  Oral cavity, tongue   protrudes midline.   NECK:  Supple without evidence of thyromegaly.  NODES:  No peripheral adenopathy of the neck, supraclavicular fossa or axillae   bilaterally.  LUNGS:  Clear to ascultation   HEART:  Regular rate and rhythm.  No murmur appreciated  ABDOMEN:  Soft. No evidence of hepatosplenomegaly.  Positive bowel sounds.  EXTREMITIES:  Without Edema.  NEUROLOGIC:  Cranial nerves II through XII were intact. Normal stance and gait motor and sensory grossly within normal limits          IMPRESSION:    A 24 y.o. 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 Stereotactic radio therapy to the right frontal excised brain metastasis and left cerebellar metastasis complete 1/10/2019.  Now status post radiation therapy to an additional right frontal lobe lesion complete 4/23/2019, no evidence of new disease complete resolution of the most recently irradiated lesion    RECOMMENDATIONS:   We are going to repeat the MRI scan in 3 months " or sooner as needed and see him up at that time.      Thank you for the opportunity to participate in his care.  If any questions or comments, please do not hesitate in calling.      Please note that this dictation was created using voice recognition software. I have made every reasonable attempt to correct obvious errors, but I expect that there are errors of grammar and possibly content that I did not discover before finalizing the note.

## 2019-08-02 DIAGNOSIS — G93.9 LESION OF RIGHT FRONTAL LOBE OF BRAIN: ICD-10-CM

## 2019-08-28 ENCOUNTER — HOSPITAL ENCOUNTER (OUTPATIENT)
Dept: LAB | Facility: MEDICAL CENTER | Age: 24
End: 2019-08-28
Attending: RADIOLOGY
Payer: COMMERCIAL

## 2019-08-28 LAB
ALBUMIN SERPL BCP-MCNC: 4.5 G/DL (ref 3.2–4.9)
ALBUMIN/GLOB SERPL: 1.5 G/DL
ALP SERPL-CCNC: 87 U/L (ref 30–99)
ALT SERPL-CCNC: 20 U/L (ref 2–50)
ANION GAP SERPL CALC-SCNC: 11 MMOL/L (ref 0–11.9)
AST SERPL-CCNC: 23 U/L (ref 12–45)
BASOPHILS # BLD AUTO: 0.5 % (ref 0–1.8)
BASOPHILS # BLD: 0.02 K/UL (ref 0–0.12)
BILIRUB SERPL-MCNC: 1 MG/DL (ref 0.1–1.5)
BUN SERPL-MCNC: 23 MG/DL (ref 8–22)
CALCIUM SERPL-MCNC: 9.6 MG/DL (ref 8.5–10.5)
CHLORIDE SERPL-SCNC: 106 MMOL/L (ref 96–112)
CK SERPL-CCNC: 256 U/L (ref 0–154)
CO2 SERPL-SCNC: 24 MMOL/L (ref 20–33)
CREAT SERPL-MCNC: 1.02 MG/DL (ref 0.5–1.4)
EOSINOPHIL # BLD AUTO: 0.16 K/UL (ref 0–0.51)
EOSINOPHIL NFR BLD: 3.9 % (ref 0–6.9)
ERYTHROCYTE [DISTWIDTH] IN BLOOD BY AUTOMATED COUNT: 37.8 FL (ref 35.9–50)
GLOBULIN SER CALC-MCNC: 3.1 G/DL (ref 1.9–3.5)
GLUCOSE SERPL-MCNC: 82 MG/DL (ref 65–99)
HCT VFR BLD AUTO: 45.7 % (ref 42–52)
HGB BLD-MCNC: 15.2 G/DL (ref 14–18)
IMM GRANULOCYTES # BLD AUTO: 0.03 K/UL (ref 0–0.11)
IMM GRANULOCYTES NFR BLD AUTO: 0.7 % (ref 0–0.9)
LYMPHOCYTES # BLD AUTO: 1.49 K/UL (ref 1–4.8)
LYMPHOCYTES NFR BLD: 36.3 % (ref 22–41)
MCH RBC QN AUTO: 28.6 PG (ref 27–33)
MCHC RBC AUTO-ENTMCNC: 33.3 G/DL (ref 33.7–35.3)
MCV RBC AUTO: 86.1 FL (ref 81.4–97.8)
MONOCYTES # BLD AUTO: 0.37 K/UL (ref 0–0.85)
MONOCYTES NFR BLD AUTO: 9 % (ref 0–13.4)
NEUTROPHILS # BLD AUTO: 2.04 K/UL (ref 1.82–7.42)
NEUTROPHILS NFR BLD: 49.6 % (ref 44–72)
NRBC # BLD AUTO: 0 K/UL
NRBC BLD-RTO: 0 /100 WBC
PLATELET # BLD AUTO: 256 K/UL (ref 164–446)
PMV BLD AUTO: 9 FL (ref 9–12.9)
POTASSIUM SERPL-SCNC: 4 MMOL/L (ref 3.6–5.5)
PROT SERPL-MCNC: 7.6 G/DL (ref 6–8.2)
RBC # BLD AUTO: 5.31 M/UL (ref 4.7–6.1)
SODIUM SERPL-SCNC: 141 MMOL/L (ref 135–145)
WBC # BLD AUTO: 4.1 K/UL (ref 4.8–10.8)

## 2019-08-28 PROCEDURE — 82550 ASSAY OF CK (CPK): CPT

## 2019-08-28 PROCEDURE — 36415 COLL VENOUS BLD VENIPUNCTURE: CPT

## 2019-08-28 PROCEDURE — 80053 COMPREHEN METABOLIC PANEL: CPT

## 2019-08-28 PROCEDURE — 85025 COMPLETE CBC W/AUTO DIFF WBC: CPT

## 2019-09-25 ENCOUNTER — HOSPITAL ENCOUNTER (OUTPATIENT)
Dept: LAB | Facility: MEDICAL CENTER | Age: 24
End: 2019-09-25
Attending: RADIOLOGY
Payer: COMMERCIAL

## 2019-09-25 LAB
ALBUMIN SERPL BCP-MCNC: 4.8 G/DL (ref 3.2–4.9)
ALBUMIN/GLOB SERPL: 1.8 G/DL
ALP SERPL-CCNC: 105 U/L (ref 30–99)
ALT SERPL-CCNC: 17 U/L (ref 2–50)
ANION GAP SERPL CALC-SCNC: 7 MMOL/L (ref 0–11.9)
AST SERPL-CCNC: 17 U/L (ref 12–45)
BASOPHILS # BLD AUTO: 0.4 % (ref 0–1.8)
BASOPHILS # BLD: 0.02 K/UL (ref 0–0.12)
BILIRUB SERPL-MCNC: 0.8 MG/DL (ref 0.1–1.5)
BUN SERPL-MCNC: 25 MG/DL (ref 8–22)
CALCIUM SERPL-MCNC: 9.8 MG/DL (ref 8.5–10.5)
CHLORIDE SERPL-SCNC: 105 MMOL/L (ref 96–112)
CK SERPL-CCNC: 69 U/L (ref 0–154)
CO2 SERPL-SCNC: 26 MMOL/L (ref 20–33)
CREAT SERPL-MCNC: 1.09 MG/DL (ref 0.5–1.4)
EOSINOPHIL # BLD AUTO: 0.14 K/UL (ref 0–0.51)
EOSINOPHIL NFR BLD: 2.7 % (ref 0–6.9)
ERYTHROCYTE [DISTWIDTH] IN BLOOD BY AUTOMATED COUNT: 39 FL (ref 35.9–50)
FASTING STATUS PATIENT QL REPORTED: NORMAL
GLOBULIN SER CALC-MCNC: 2.6 G/DL (ref 1.9–3.5)
GLUCOSE SERPL-MCNC: 81 MG/DL (ref 65–99)
HCT VFR BLD AUTO: 45.8 % (ref 42–52)
HGB BLD-MCNC: 15.7 G/DL (ref 14–18)
IMM GRANULOCYTES # BLD AUTO: 0.05 K/UL (ref 0–0.11)
IMM GRANULOCYTES NFR BLD AUTO: 1 % (ref 0–0.9)
LYMPHOCYTES # BLD AUTO: 1.99 K/UL (ref 1–4.8)
LYMPHOCYTES NFR BLD: 38.7 % (ref 22–41)
MCH RBC QN AUTO: 30.3 PG (ref 27–33)
MCHC RBC AUTO-ENTMCNC: 34.3 G/DL (ref 33.7–35.3)
MCV RBC AUTO: 88.2 FL (ref 81.4–97.8)
MONOCYTES # BLD AUTO: 0.39 K/UL (ref 0–0.85)
MONOCYTES NFR BLD AUTO: 7.6 % (ref 0–13.4)
NEUTROPHILS # BLD AUTO: 2.55 K/UL (ref 1.82–7.42)
NEUTROPHILS NFR BLD: 49.6 % (ref 44–72)
NRBC # BLD AUTO: 0 K/UL
NRBC BLD-RTO: 0 /100 WBC
PLATELET # BLD AUTO: 266 K/UL (ref 164–446)
PMV BLD AUTO: 9.5 FL (ref 9–12.9)
POTASSIUM SERPL-SCNC: 4.2 MMOL/L (ref 3.6–5.5)
PROT SERPL-MCNC: 7.4 G/DL (ref 6–8.2)
RBC # BLD AUTO: 5.19 M/UL (ref 4.7–6.1)
SODIUM SERPL-SCNC: 138 MMOL/L (ref 135–145)
WBC # BLD AUTO: 5.1 K/UL (ref 4.8–10.8)

## 2019-09-25 PROCEDURE — 85025 COMPLETE CBC W/AUTO DIFF WBC: CPT

## 2019-09-25 PROCEDURE — 80053 COMPREHEN METABOLIC PANEL: CPT

## 2019-09-25 PROCEDURE — 36415 COLL VENOUS BLD VENIPUNCTURE: CPT

## 2019-09-25 PROCEDURE — 82550 ASSAY OF CK (CPK): CPT

## 2019-10-22 ENCOUNTER — HOSPITAL ENCOUNTER (OUTPATIENT)
Dept: LAB | Facility: MEDICAL CENTER | Age: 24
End: 2019-10-22
Attending: INTERNAL MEDICINE
Payer: COMMERCIAL

## 2019-10-22 LAB
ALBUMIN SERPL BCP-MCNC: 4.7 G/DL (ref 3.2–4.9)
ALBUMIN/GLOB SERPL: 1.5 G/DL
ALP SERPL-CCNC: 97 U/L (ref 30–99)
ALT SERPL-CCNC: 18 U/L (ref 2–50)
ANION GAP SERPL CALC-SCNC: 10 MMOL/L (ref 0–11.9)
AST SERPL-CCNC: 21 U/L (ref 12–45)
BASOPHILS # BLD AUTO: 0.6 % (ref 0–1.8)
BASOPHILS # BLD: 0.03 K/UL (ref 0–0.12)
BILIRUB SERPL-MCNC: 0.8 MG/DL (ref 0.1–1.5)
BUN SERPL-MCNC: 20 MG/DL (ref 8–22)
CALCIUM SERPL-MCNC: 9.5 MG/DL (ref 8.5–10.5)
CHLORIDE SERPL-SCNC: 106 MMOL/L (ref 96–112)
CK SERPL-CCNC: 87 U/L (ref 0–154)
CO2 SERPL-SCNC: 26 MMOL/L (ref 20–33)
CREAT SERPL-MCNC: 1.15 MG/DL (ref 0.5–1.4)
EOSINOPHIL # BLD AUTO: 0.15 K/UL (ref 0–0.51)
EOSINOPHIL NFR BLD: 3.2 % (ref 0–6.9)
ERYTHROCYTE [DISTWIDTH] IN BLOOD BY AUTOMATED COUNT: 37.6 FL (ref 35.9–50)
GLOBULIN SER CALC-MCNC: 3.1 G/DL (ref 1.9–3.5)
GLUCOSE SERPL-MCNC: 106 MG/DL (ref 65–99)
HCT VFR BLD AUTO: 44.2 % (ref 42–52)
HGB BLD-MCNC: 15.3 G/DL (ref 14–18)
IMM GRANULOCYTES # BLD AUTO: 0.08 K/UL (ref 0–0.11)
IMM GRANULOCYTES NFR BLD AUTO: 1.7 % (ref 0–0.9)
LYMPHOCYTES # BLD AUTO: 1.75 K/UL (ref 1–4.8)
LYMPHOCYTES NFR BLD: 37.7 % (ref 22–41)
MCH RBC QN AUTO: 29.8 PG (ref 27–33)
MCHC RBC AUTO-ENTMCNC: 34.6 G/DL (ref 33.7–35.3)
MCV RBC AUTO: 86.2 FL (ref 81.4–97.8)
MONOCYTES # BLD AUTO: 0.36 K/UL (ref 0–0.85)
MONOCYTES NFR BLD AUTO: 7.8 % (ref 0–13.4)
NEUTROPHILS # BLD AUTO: 2.27 K/UL (ref 1.82–7.42)
NEUTROPHILS NFR BLD: 49 % (ref 44–72)
NRBC # BLD AUTO: 0 K/UL
NRBC BLD-RTO: 0 /100 WBC
PLATELET # BLD AUTO: 278 K/UL (ref 164–446)
PMV BLD AUTO: 9.3 FL (ref 9–12.9)
POTASSIUM SERPL-SCNC: 3.4 MMOL/L (ref 3.6–5.5)
PROT SERPL-MCNC: 7.8 G/DL (ref 6–8.2)
RBC # BLD AUTO: 5.13 M/UL (ref 4.7–6.1)
SODIUM SERPL-SCNC: 142 MMOL/L (ref 135–145)
WBC # BLD AUTO: 4.6 K/UL (ref 4.8–10.8)

## 2019-10-22 PROCEDURE — 80053 COMPREHEN METABOLIC PANEL: CPT

## 2019-10-22 PROCEDURE — 82550 ASSAY OF CK (CPK): CPT

## 2019-10-22 PROCEDURE — 36415 COLL VENOUS BLD VENIPUNCTURE: CPT

## 2019-10-22 PROCEDURE — 85025 COMPLETE CBC W/AUTO DIFF WBC: CPT

## 2019-10-24 ENCOUNTER — HOSPITAL ENCOUNTER (OUTPATIENT)
Dept: RADIOLOGY | Facility: MEDICAL CENTER | Age: 24
End: 2019-10-24
Attending: RADIOLOGY
Payer: COMMERCIAL

## 2019-10-24 ENCOUNTER — HOSPITAL ENCOUNTER (OUTPATIENT)
Dept: RADIOLOGY | Facility: MEDICAL CENTER | Age: 24
End: 2019-10-24
Attending: INTERNAL MEDICINE
Payer: COMMERCIAL

## 2019-10-24 DIAGNOSIS — C34.92 SQUAMOUS CARCINOMA OF LUNG, LEFT (HCC): ICD-10-CM

## 2019-10-24 PROCEDURE — A9576 INJ PROHANCE MULTIPACK: HCPCS | Performed by: RADIOLOGY

## 2019-10-24 PROCEDURE — 70553 MRI BRAIN STEM W/O & W/DYE: CPT

## 2019-10-24 PROCEDURE — 71260 CT THORAX DX C+: CPT

## 2019-10-24 PROCEDURE — 700117 HCHG RX CONTRAST REV CODE 255: Performed by: RADIOLOGY

## 2019-10-24 PROCEDURE — 700117 HCHG RX CONTRAST REV CODE 255: Performed by: INTERNAL MEDICINE

## 2019-10-24 RX ADMIN — GADOTERIDOL 18 ML: 279.3 INJECTION, SOLUTION INTRAVENOUS at 12:31

## 2019-10-24 RX ADMIN — IOHEXOL 100 ML: 350 INJECTION, SOLUTION INTRAVENOUS at 11:30

## 2019-10-24 RX ADMIN — IOHEXOL 25 ML: 240 INJECTION, SOLUTION INTRATHECAL; INTRAVASCULAR; INTRAVENOUS; ORAL at 11:15

## 2019-10-28 ENCOUNTER — HOSPITAL ENCOUNTER (OUTPATIENT)
Dept: RADIATION ONCOLOGY | Facility: MEDICAL CENTER | Age: 24
End: 2019-10-31
Attending: RADIOLOGY
Payer: COMMERCIAL

## 2019-10-28 VITALS
OXYGEN SATURATION: 97 % | TEMPERATURE: 98.8 F | DIASTOLIC BLOOD PRESSURE: 85 MMHG | HEART RATE: 63 BPM | BODY MASS INDEX: 27.01 KG/M2 | HEIGHT: 71 IN | WEIGHT: 192.9 LBS | SYSTOLIC BLOOD PRESSURE: 144 MMHG

## 2019-10-28 DIAGNOSIS — G93.9 LESION OF RIGHT FRONTAL LOBE OF BRAIN: ICD-10-CM

## 2019-10-28 PROCEDURE — 99213 OFFICE O/P EST LOW 20 MIN: CPT | Performed by: RADIOLOGY

## 2019-10-28 PROCEDURE — 99212 OFFICE O/P EST SF 10 MIN: CPT | Performed by: RADIOLOGY

## 2019-10-28 ASSESSMENT — PAIN SCALES - GENERAL: PAINLEVEL: NO PAIN

## 2019-10-28 NOTE — NON-PROVIDER
"Patient was seen today in clinic with Dr. Davis for follow up.  Vitals signs and weight were obtained and pain assessment was completed.  Allergies and medications were reviewed with the patient.  Review of systems completed.     Vitals/Pain:  Vitals:    10/28/19 1358   BP: 144/85   BP Location: Left arm   Patient Position: Sitting   BP Cuff Size: Adult   Pulse: 63   Temp: 37.1 °C (98.8 °F)   TempSrc: Temporal   SpO2: 97%   Weight: 87.5 kg (192 lb 14.4 oz)   Height: 1.803 m (5' 11\")   Pain Score: No pain        Allergies:   Patient has no known allergies.    Current Medications:  Current Outpatient Medications   Medication Sig Dispense Refill   • Alectinib HCl (ALECENSA PO) Take 600 mg by mouth 2 Times a Day.     • raNITidine 15 mg/mL (ZANTAC) Syrup Take 75 mg by mouth 2 Times a Day.     • oxyCODONE immediate-release (ROXICODONE) 5 MG Tab Take 5 mg by mouth every four hours as needed for Severe Pain.     • dexamethasone (DECADRON) 1 MG Tab Take 1 Tab by mouth every 8 hours. (Patient not taking: Reported on 2/22/2019) 10 Tab 0   • levETIRAcetam (KEPPRA) 500 MG Tab Take 1 Tab by mouth 2 Times a Day. (Patient not taking: Reported on 2/22/2019) 60 Tab 0   • meloxicam (MOBIC) 15 MG tablet Take 15 mg by mouth as needed (For headaches).       No current facility-administered medications for this encounter.          PCP:  Heath Buenrostro, [facility]    "

## 2019-10-28 NOTE — PROGRESS NOTES
RADIATION ONCOLOGY FOLLOW-UP    DATE OF SERVICE: 10/28/2019    IDENTIFICATION:   A 24 y.o. 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 Stereotactic radio therapy to the right frontal excised brain metastasis and left cerebellar metastasis complete 1/10/2019.  Now status post radiation therapy to an additional right frontal lobe lesion complete 4/23/2019        HISTORY OF PRESENT ILLNESS:   Since last seen patient has been doing well he even got engaged.  He is continuing on his immune therapy.  Recent CT scan of the chest abdomen and pelvis was performed which showed stable to slight decrease in size of the left hilar/tube suprahilar mass bilateral groundglass pulmonary nodules are mildly improved and no new lesions in the chest abdomen or pelvis.  The MRI scan of the brain which was done 1024 shows no new lesions but there is approximately 11 x 13 mm size enhancing lesion in the left cerebellum in the area that was a radiated.  There is also the surgical cavity in the right parietal lobe with a few nodular enhancing areas in the adjacent brain parenchyma.  There stain is can either be consistent with radiation change or tumor progression.  Currently he is asymptomatic he is feeling good essentially without any major complaints.    CURRENT MEDICATIONS:  Current Outpatient Medications   Medication Sig Dispense Refill   • Alectinib HCl (ALECENSA PO) Take 600 mg by mouth 2 Times a Day.     • raNITidine 15 mg/mL (ZANTAC) Syrup Take 75 mg by mouth 2 Times a Day.     • oxyCODONE immediate-release (ROXICODONE) 5 MG Tab Take 5 mg by mouth every four hours as needed for Severe Pain.     • dexamethasone (DECADRON) 1 MG Tab Take 1 Tab by mouth every 8 hours. (Patient not taking: Reported on 2/22/2019) 10 Tab 0   • levETIRAcetam (KEPPRA) 500 MG Tab Take 1 Tab by mouth 2 Times a Day. (Patient not taking: Reported on 2/22/2019) 60 Tab 0   • meloxicam (MOBIC) 15 MG tablet Take 15 mg by mouth as needed (For headaches).    "    No current facility-administered medications for this encounter.        ALLERGIES:  Patient has no known allergies.    FAMILY HISTORY:    Family History   Problem Relation Age of Onset   • Heart Disease Mother    • Rheumatologic Disease Mother         aortitis   • Rheumatologic Disease Father    • Diabetes Maternal Grandfather    • Cancer Maternal Grandfather    • Stroke Maternal Grandmother    [unfilled]        SOCIAL HISTORY:     reports that he has never smoked. He has never used smokeless tobacco. He reports that he drinks alcohol. He reports that he does not use drugs.      REVIEW OF SYSTEMS: Is significant for his mild headaches intermittently about 1 every few weeks  The rest of the review of systems has been reviewed by me and is documented in the nursing note in [facility] dated 10/28/2019    PHYSICAL EXAM:     ECOG PERFORMANCE STATUS:  0= Fully active, able to carry on all pre-disease performance without restriction.       Vitals:    10/28/19 1358   BP: 144/85   BP Location: Left arm   Patient Position: Sitting   BP Cuff Size: Adult   Pulse: 63   Temp: 37.1 °C (98.8 °F)   TempSrc: Temporal   SpO2: 97%   Weight: 87.5 kg (192 lb 14.4 oz)   Height: 1.803 m (5' 11\")   Pain Score: No pain        GENERAL: Well-appearing alert and oriented x3 in no apparent distress  HEENT:  Pupils are equal, round, and reactive to light.  Extraocular muscles   are intact. Sclerae nonicteric.  Conjunctivae pink.  Oral cavity, tongue   protrudes midline.   NECK: No palpable nodes in the neck or supra clavicular fossa   LUNGS:  Clear to ascultation   HEART:  Regular rate and rhythm.  No murmur appreciated  ABDOMEN:  Soft. No evidence of hepatosplenomegaly.  Positive bowel sounds.  EXTREMITIES:  Without Edema.  NEUROLOGIC:  Cranial nerves II through XII were intact. Normal stance and gait motor and sensory grossly within normal limits          IMPRESSION:    A 24 y.o. 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 " Stereotactic radio therapy to the right frontal excised brain metastasis and left cerebellar metastasis complete 1/10/2019.  Now status post radiation therapy to an additional right frontal lobe lesion complete 4/23/2019       RECOMMENDATIONS:   I believe most of this is consistent with radiation change in the brain so we will order a repeat MRI scan in 3 months and see him at that time.  I am happy to see him sooner as needed.  And      Thank you for the opportunity to participate in his care.  If any questions or comments, please do not hesitate in calling.      Please note that this dictation was created using voice recognition software. I have made every reasonable attempt to correct obvious errors, but I expect that there are errors of grammar and possibly content that I did not discover before finalizing the note.

## 2019-11-27 ENCOUNTER — APPOINTMENT (OUTPATIENT)
Dept: RADIOLOGY | Facility: MEDICAL CENTER | Age: 24
End: 2019-11-27
Attending: EMERGENCY MEDICINE
Payer: COMMERCIAL

## 2019-11-27 ENCOUNTER — HOSPITAL ENCOUNTER (EMERGENCY)
Facility: MEDICAL CENTER | Age: 24
End: 2019-11-27
Attending: EMERGENCY MEDICINE
Payer: COMMERCIAL

## 2019-11-27 VITALS
RESPIRATION RATE: 16 BRPM | TEMPERATURE: 99.3 F | DIASTOLIC BLOOD PRESSURE: 92 MMHG | HEIGHT: 71 IN | SYSTOLIC BLOOD PRESSURE: 138 MMHG | HEART RATE: 66 BPM | OXYGEN SATURATION: 94 % | BODY MASS INDEX: 26.54 KG/M2 | WEIGHT: 189.6 LBS

## 2019-11-27 DIAGNOSIS — C34.90 LUNG CANCER METASTATIC TO BRAIN (HCC): ICD-10-CM

## 2019-11-27 DIAGNOSIS — C79.31 LUNG CANCER METASTATIC TO BRAIN (HCC): ICD-10-CM

## 2019-11-27 DIAGNOSIS — R56.9 SEIZURE (HCC): ICD-10-CM

## 2019-11-27 LAB
ALBUMIN SERPL BCP-MCNC: 4.8 G/DL (ref 3.2–4.9)
ALBUMIN/GLOB SERPL: 1.6 G/DL
ALP SERPL-CCNC: 83 U/L (ref 30–99)
ALT SERPL-CCNC: 15 U/L (ref 2–50)
ANION GAP SERPL CALC-SCNC: 9 MMOL/L (ref 0–11.9)
APTT PPP: 28.8 SEC (ref 24.7–36)
AST SERPL-CCNC: 28 U/L (ref 12–45)
BASOPHILS # BLD AUTO: 0.5 % (ref 0–1.8)
BASOPHILS # BLD: 0.03 K/UL (ref 0–0.12)
BILIRUB SERPL-MCNC: 1 MG/DL (ref 0.1–1.5)
BUN SERPL-MCNC: 26 MG/DL (ref 8–22)
CALCIUM SERPL-MCNC: 9.6 MG/DL (ref 8.5–10.5)
CHLORIDE SERPL-SCNC: 106 MMOL/L (ref 96–112)
CO2 SERPL-SCNC: 27 MMOL/L (ref 20–33)
CREAT SERPL-MCNC: 1.25 MG/DL (ref 0.5–1.4)
EOSINOPHIL # BLD AUTO: 0.08 K/UL (ref 0–0.51)
EOSINOPHIL NFR BLD: 1.3 % (ref 0–6.9)
ERYTHROCYTE [DISTWIDTH] IN BLOOD BY AUTOMATED COUNT: 38.8 FL (ref 35.9–50)
GLOBULIN SER CALC-MCNC: 3 G/DL (ref 1.9–3.5)
GLUCOSE SERPL-MCNC: 70 MG/DL (ref 65–99)
HCT VFR BLD AUTO: 42.8 % (ref 42–52)
HGB BLD-MCNC: 14.5 G/DL (ref 14–18)
IMM GRANULOCYTES # BLD AUTO: 0.02 K/UL (ref 0–0.11)
IMM GRANULOCYTES NFR BLD AUTO: 0.3 % (ref 0–0.9)
INR PPP: 0.99 (ref 0.87–1.13)
LYMPHOCYTES # BLD AUTO: 1.44 K/UL (ref 1–4.8)
LYMPHOCYTES NFR BLD: 23.7 % (ref 22–41)
MCH RBC QN AUTO: 29.4 PG (ref 27–33)
MCHC RBC AUTO-ENTMCNC: 33.9 G/DL (ref 33.7–35.3)
MCV RBC AUTO: 86.6 FL (ref 81.4–97.8)
MONOCYTES # BLD AUTO: 0.41 K/UL (ref 0–0.85)
MONOCYTES NFR BLD AUTO: 6.7 % (ref 0–13.4)
NEUTROPHILS # BLD AUTO: 4.1 K/UL (ref 1.82–7.42)
NEUTROPHILS NFR BLD: 67.5 % (ref 44–72)
NRBC # BLD AUTO: 0 K/UL
NRBC BLD-RTO: 0 /100 WBC
PLATELET # BLD AUTO: 275 K/UL (ref 164–446)
PMV BLD AUTO: 9.1 FL (ref 9–12.9)
POTASSIUM SERPL-SCNC: 3.6 MMOL/L (ref 3.6–5.5)
PROT SERPL-MCNC: 7.8 G/DL (ref 6–8.2)
PROTHROMBIN TIME: 13.3 SEC (ref 12–14.6)
RBC # BLD AUTO: 4.94 M/UL (ref 4.7–6.1)
SODIUM SERPL-SCNC: 142 MMOL/L (ref 135–145)
WBC # BLD AUTO: 6.1 K/UL (ref 4.8–10.8)

## 2019-11-27 PROCEDURE — A9576 INJ PROHANCE MULTIPACK: HCPCS | Performed by: EMERGENCY MEDICINE

## 2019-11-27 PROCEDURE — 80053 COMPREHEN METABOLIC PANEL: CPT

## 2019-11-27 PROCEDURE — 700105 HCHG RX REV CODE 258: Performed by: EMERGENCY MEDICINE

## 2019-11-27 PROCEDURE — 85025 COMPLETE CBC W/AUTO DIFF WBC: CPT

## 2019-11-27 PROCEDURE — 700111 HCHG RX REV CODE 636 W/ 250 OVERRIDE (IP): Performed by: EMERGENCY MEDICINE

## 2019-11-27 PROCEDURE — 85730 THROMBOPLASTIN TIME PARTIAL: CPT

## 2019-11-27 PROCEDURE — 99285 EMERGENCY DEPT VISIT HI MDM: CPT

## 2019-11-27 PROCEDURE — 94760 N-INVAS EAR/PLS OXIMETRY 1: CPT

## 2019-11-27 PROCEDURE — 700117 HCHG RX CONTRAST REV CODE 255: Performed by: EMERGENCY MEDICINE

## 2019-11-27 PROCEDURE — 85610 PROTHROMBIN TIME: CPT

## 2019-11-27 PROCEDURE — 96374 THER/PROPH/DIAG INJ IV PUSH: CPT

## 2019-11-27 PROCEDURE — 70553 MRI BRAIN STEM W/O & W/DYE: CPT

## 2019-11-27 PROCEDURE — 96375 TX/PRO/DX INJ NEW DRUG ADDON: CPT

## 2019-11-27 RX ORDER — SODIUM CHLORIDE 9 MG/ML
INJECTION, SOLUTION INTRAVENOUS CONTINUOUS
Status: DISCONTINUED | OUTPATIENT
Start: 2019-11-27 | End: 2019-11-28 | Stop reason: HOSPADM

## 2019-11-27 RX ORDER — DEXAMETHASONE 2 MG/1
2 TABLET ORAL 2 TIMES DAILY
Qty: 18 TAB | Refills: 0 | Status: SHIPPED | OUTPATIENT
Start: 2019-11-27 | End: 2019-12-06

## 2019-11-27 RX ORDER — DEXAMETHASONE SODIUM PHOSPHATE 4 MG/ML
10 INJECTION, SOLUTION INTRA-ARTICULAR; INTRALESIONAL; INTRAMUSCULAR; INTRAVENOUS; SOFT TISSUE ONCE
Status: COMPLETED | OUTPATIENT
Start: 2019-11-27 | End: 2019-11-27

## 2019-11-27 RX ORDER — DEXAMETHASONE 4 MG/1
4 TABLET ORAL 2 TIMES DAILY
Qty: 6 TAB | Refills: 0 | Status: SHIPPED | OUTPATIENT
Start: 2019-11-27 | End: 2019-11-30

## 2019-11-27 RX ORDER — LORAZEPAM 2 MG/ML
1 INJECTION INTRAMUSCULAR ONCE
Status: COMPLETED | OUTPATIENT
Start: 2019-11-27 | End: 2019-11-27

## 2019-11-27 RX ADMIN — SODIUM CHLORIDE 125 ML: 9 INJECTION, SOLUTION INTRAVENOUS at 17:45

## 2019-11-27 RX ADMIN — LORAZEPAM 1 MG: 2 INJECTION INTRAMUSCULAR; INTRAVENOUS at 17:44

## 2019-11-27 RX ADMIN — DEXAMETHASONE SODIUM PHOSPHATE 10 MG: 4 INJECTION, SOLUTION INTRA-ARTICULAR; INTRALESIONAL; INTRAMUSCULAR; INTRAVENOUS; SOFT TISSUE at 22:49

## 2019-11-27 RX ADMIN — GADOTERIDOL 15 ML: 279.3 INJECTION, SOLUTION INTRAVENOUS at 18:59

## 2019-11-28 NOTE — DISCHARGE INSTRUCTIONS
Make sure you take the Decadron with food as directed for the full 12 day taper dose.  Follow-up with Dr. aDvis next week for recheck  Rest as much as possible over the next week  Return to the ER if any significant change or worsening in your condition.

## 2019-11-28 NOTE — ED PROVIDER NOTES
ED Provider Note     Scribed for Mechelle Barrientos D.O. by Radha Cutler. 11/27/2019, 5:12 PM.     Primary care provider: KIAN Rod  Means of arrival: Walk-in         History obtained from: Patient  History limited by: None    CHIEF COMPLAINT  Chief Complaint   Patient presents with   • Dizziness   • Numbness   • GLF       HPI  Keanu Trivedi is a 24 y.o. male who presents to the Emergency Department for evaluation of an acute episode of possible seizure-like activity onset earlier today. The patient reports that he has a history of non-small cell lung cancer that metasized to the brain in November of 2018 and caused a similar episode of seizure-like activity. He had the tumor surgically removed from his brain in December of 2018 and began receiving stereotactic treatment. His last treatment was in June of 2019.The patient states that he receives medical care from [facility] and locally. His oncologist at [facility] is Dr. Cutler and his local Oncologist is Dr. Davis.  The patient notes that he had an MRI with contrast performed about three weeks ago which showed recurrent metastatic lesions and inflammation to the surgical site. He notes that he is scheduled to receive a PET scan in January of 2020.  Since the patient had the tumor surgically removed, the patient has not experienced any additional seizure-like activity since his first incident until today. Today, the patient reports that when he was at work  he began to experience an acute episode of muscular twitching the left side of his mouth. He notes shortly after the onset of the muscular twitching, he began to experience shaking of the left arm, shaking of the left leg and chest pain.  He then got very lightheaded and his boss had to sit him down.  He states shortly thereafter all of his symptoms had resolved and he has had no residual since then.  He denies experiencing a headache at this time. The patient reports that he does not receive  chemotherapy, but instead takes Alectinib for his lung cancer.     REVIEW OF SYSTEMS  Pertinent positives include resolved muscular twitching the left side of the mouth, resolved shaking of the left upper extremity, resolved shaking of the left lower extremity, resolved chest pain. Pertinent negatives include no headache.   See HPI for further details. All other systems are negative.    PAST MEDICAL HISTORY  Past Medical History:   Diagnosis Date   • Cancer (HCC)     non-small cell lung       FAMILY HISTORY  Family History   Problem Relation Age of Onset   • Heart Disease Mother    • Rheumatologic Disease Mother         aortitis   • Rheumatologic Disease Father    • Diabetes Maternal Grandfather    • Cancer Maternal Grandfather    • Stroke Maternal Grandmother        SOCIAL HISTORY  Social History     Tobacco Use   • Smoking status: Never Smoker   • Smokeless tobacco: Never Used   Substance Use Topics   • Alcohol use: Not Currently     Comment: rare   • Drug use: No      Social History     Substance and Sexual Activity   Drug Use No       SURGICAL HISTORY  Past Surgical History:   Procedure Laterality Date   • CRANIOTOMY STEALTH Right 11/30/2018    Procedure: CRANIOTOMY STEALTH- FRONTAL BRAIN TUMOR RESECTION;  Surgeon: Stephen Manzano III, M.D.;  Location: SURGERY [facility];  Service: Neurosurgery   • OTHER ORTHOPEDIC SURGERY      foot repair       CURRENT MEDICATIONS    Current Facility-Administered Medications:   •  NS infusion, , Intravenous, Continuous, Mechelle Barrientos D.O., Stopped at 11/27/19 1925    Current Outpatient Medications:   •  dexamethasone (DECADRON) 4 MG Tab, Take 1 Tab by mouth 2 times a day for 3 days., Disp: 6 Tab, Rfl: 0  •  dexamethasone (DECADRON) 2 MG tablet, Take 1 Tab by mouth 2 times a day for 9 days. Take 1 tablet 3 times daily for 3 days then 1 tablet twice daily for 3 days then 1 tablet daily for 3 days and stop, Disp: 18 Tab, Rfl: 0  •  Alectinib HCl (ALECENSA) 150 MG Cap, Take  "600 mg by mouth 2 Times a Day., Disp: , Rfl:     ALLERGIES  No Known Allergies    PHYSICAL EXAM  VITAL SIGNS: /88   Pulse 79   Temp 37.4 °C (99.3 °F) (Temporal)   Resp 16   Ht 1.803 m (5' 11\")   Wt 86 kg (189 lb 9.5 oz)   SpO2 97%   BMI 26.44 kg/m²     Constitutional: Patient is well developed, well nourished. Non-toxic appearing. No acute distress.   HENT: Normocephalic, atraumatic, Nose normal with no mucosal edema. Oropharynx moist without erythema.  Eyes: PERRL, EOMI, Conjunctiva without erythema.   Neck: Supple with Normal range of motion in flexion, extension and lateral rotation. No tenderness along the bony prominences or paraspinal muscles.  Lymphatic: No lymphadenopathy noted.   Cardiovascular: Normal heart rate and Regular rhythm. No murmur.  Thorax & Lungs: Clear and equal breath sounds with good excursion. No respiratory distress, no rhonchi or wheezing.  Abdomen: Bowel sounds normal in all four quadrants. Soft,nontender, no rebound , guarding, palpable masses.   Skin: Warm, Dry, No erythema, No rashes.   Back: No cervical, thoracic, or lumbosacral tenderness.   Extremities: Peripheral pulses 4/4 No edema, No tenderness.   Musculoskeletal: Normal range of motion in all major joints.   Neurologic: Alert & oriented x 3, Normal motor function, Normal sensory function, No lateralizing or focal deficits noted. DTR's 4/4 bilaterally.  Psychiatric: Affect normal, Judgment normal, Mood normal.     DIAGNOSTICS/PROCEDURES    LABS  Results for orders placed or performed during the hospital encounter of 11/27/19   CBC WITH DIFFERENTIAL   Result Value Ref Range    WBC 6.1 4.8 - 10.8 K/uL    RBC 4.94 4.70 - 6.10 M/uL    Hemoglobin 14.5 14.0 - 18.0 g/dL    Hematocrit 42.8 42.0 - 52.0 %    MCV 86.6 81.4 - 97.8 fL    MCH 29.4 27.0 - 33.0 pg    MCHC 33.9 33.7 - 35.3 g/dL    RDW 38.8 35.9 - 50.0 fL    Platelet Count 275 164 - 446 K/uL    MPV 9.1 9.0 - 12.9 fL    Neutrophils-Polys 67.50 44.00 - 72.00 %    " Lymphocytes 23.70 22.00 - 41.00 %    Monocytes 6.70 0.00 - 13.40 %    Eosinophils 1.30 0.00 - 6.90 %    Basophils 0.50 0.00 - 1.80 %    Immature Granulocytes 0.30 0.00 - 0.90 %    Nucleated RBC 0.00 /100 WBC    Neutrophils (Absolute) 4.10 1.82 - 7.42 K/uL    Lymphs (Absolute) 1.44 1.00 - 4.80 K/uL    Monos (Absolute) 0.41 0.00 - 0.85 K/uL    Eos (Absolute) 0.08 0.00 - 0.51 K/uL    Baso (Absolute) 0.03 0.00 - 0.12 K/uL    Immature Granulocytes (abs) 0.02 0.00 - 0.11 K/uL    NRBC (Absolute) 0.00 K/uL   COMP METABOLIC PANEL   Result Value Ref Range    Sodium 142 135 - 145 mmol/L    Potassium 3.6 3.6 - 5.5 mmol/L    Chloride 106 96 - 112 mmol/L    Co2 27 20 - 33 mmol/L    Anion Gap 9.0 0.0 - 11.9    Glucose 70 65 - 99 mg/dL    Bun 26 (H) 8 - 22 mg/dL    Creatinine 1.25 0.50 - 1.40 mg/dL    Calcium 9.6 8.5 - 10.5 mg/dL    AST(SGOT) 28 12 - 45 U/L    ALT(SGPT) 15 2 - 50 U/L    Alkaline Phosphatase 83 30 - 99 U/L    Total Bilirubin 1.0 0.1 - 1.5 mg/dL    Albumin 4.8 3.2 - 4.9 g/dL    Total Protein 7.8 6.0 - 8.2 g/dL    Globulin 3.0 1.9 - 3.5 g/dL    A-G Ratio 1.6 g/dL   APTT   Result Value Ref Range    APTT 28.8 24.7 - 36.0 sec   PROTHROMBIN TIME (INR)   Result Value Ref Range    PT 13.3 12.0 - 14.6 sec    INR 0.99 0.87 - 1.13   ESTIMATED GFR   Result Value Ref Range    GFR If African American >60 >60 mL/min/1.73 m 2    GFR If Non African American >60 >60 mL/min/1.73 m 2       Labs reviewed by me    RADIOLOGY/PROCEDURES  MR-BRAIN-WITH & W/O    (Results Pending)     Results and radiologist interpretation reviewed by me.     COURSE & MEDICAL DECISION MAKING  Pertinent Labs & Imaging studies reviewed. (See chart for details)    5:12 PM - Patient was seen and evaluated at bedside. Discussed plan of care with patient and his family which includes obtaining an MRI of his brain and ordering lab work for further evaluation of his condition. I informed the patient I will also contact one of his Oncologist for further evaluation of  his condition and to establish plan of care. Patient's verbalizes their understanding and agreement to the plan of care. Ordered estimated GFR, INR, APTT, CMP, CBC with differential, MR-brain. Patient was medicated with Ativan 1 mg and IV fluids . Differential diagnoses include, but are not limited to, seizure secondary to cerebral edema, tumor necrosis, tumor hemorrhage    HYDRATION: Based on the patient's presentation of seizure activity, the patient was given IV fluids. IV Hydration was used because oral hydration was not adequate alone. Upon recheck following hydration, the patient was improved.      7:31 PM Recheck. The patient was informed that his lab results had no concerning findings and at this time we are still currently waiting for the patient's MRI results.     9:21 PM At this time, I spoke to the radiologist about the patient's MRI results. The radiologist states that the patient's MR shows two lesions in the brain. The left cerebellar lesion shows no change. The right frontal lesion still shows increased enhacement which is indicative of active disease. There is minimal edema and no hemorrhage in the right frontal lesion.    9:37 PM Recheck. I updated the patient on my conversation with the radiologist. Discussed plan of care with patient which includes contacting the patient's Oncologist for further evaluation of his condition and to establish plan of care. Patient verbalizes his understanding and agreement to the plan of care.     9:40 PM attempted to call Dr. Cutler (Oncologist) at [facility] at this time.     9:56 PM I discussed the patient's case and the above findings with Dr. Davis's partner, Dr. Marcus (Oncologist), who advises treating the patient with Decadron 10 mg in the ED and then a tapering dose of Decadron over the next 12 days.     10:10 PM Patient was medicated with Decadron 10 mg    10:31 PM Recheck. The patient was informed about my conversation with Dr. Marcus. The patient was  informed with the monoclonal antibody combined with the stereotactic treatment that he received in June can cause an adverse reaction six months to one year after receiving the treatment. He was informed that the seizure could happen once and never happen again, or it can happen again. If the patient experienced another seizure again, he is advised to immediately return to the ED. Plan of care includes treating the patient with Decadron in the ED and then start a tapering dose of Decadron. He was instructed to call Dr. Davis on 12/2/19. The patient has vital signs and at this time, the patient is stable for discharge.  The patient will be discharged with a prescription for Decadron which is to be taken as directed for the full 12 day taper dose. He was given a referral to Dr. Davis and instructed to immediately return to the ED if his symptoms worsen. Patient verbalizes their understanding and agreement to plan of discharge.       The patient will return for new or worsening symptoms and is stable at the time of discharge.      DISPOSITION:  Patient will be discharged home in stable condition.    FOLLOW UP:  Allison Davis M.D.  [address] [place] [id number] [phone]    Call   On Monday to see Dr. Davis next week      OUTPATIENT MEDICATIONS:  New Prescriptions    DEXAMETHASONE (DECADRON) 2 MG TABLET    Take 1 Tab by mouth 2 times a day for 9 days. Take 1 tablet 3 times daily for 3 days then 1 tablet twice daily for 3 days then 1 tablet daily for 3 days and stop    DEXAMETHASONE (DECADRON) 4 MG TAB    Take 1 Tab by mouth 2 times a day for 3 days.         FINAL IMPRESSION  1. Seizure (HCC)    2. Lung cancer metastatic to brain (HCC)         Radha LARIOS (Emeterio), am scribing for, and in the presence of, Mechelle Barrientos D.O..    Electronically signed by: Radha Cutler (Emeterio), 11/27/2019    Mechelle LARIOS D.O. personally performed the services described in this documentation, as scribed by Radha  Omayra in my presence, and it is both accurate and complete.    C    The note accurately reflects work and decisions made by me.  Mechelle Barrientos  11/28/2019  1:39 AM

## 2019-11-28 NOTE — ED TRIAGE NOTES
"Patient ambulatory to triage with c/o left arm, leg, and facial twitching today while at work with dizziness at 3:37 today lasting approx 2 minutes. Patient with hx of non-small cell lung cancer with mets to the brain. Reports recent scans completed. aao x 4. Neuro intact. Speaking in full, clear sentences. .Blood Pressure: 126/88, Pulse: 79, Respiration: 16, Temperature: 37.4 °C (99.3 °F), Height: 180.3 cm (5' 11\"), Weight: 86 kg (189 lb 9.5 oz), Pulse Oximetry: 97 %, O2 (LPM): 0, O2 Delivery: None (Room Air)    Mask supplied to patient.  Patient to senior lounge with family and instructed to inform staff of any needs.  Charge RN aware.  "

## 2019-11-28 NOTE — ED NOTES
Med Rec complete per Pt at bedside  Allergies Reviewed  No ABX in the last 14 days  Ok per Pt to discuss medications with visitor/s present.

## 2019-11-28 NOTE — ED NOTES
Discharge instructions given to patient, 2 written prescriptions provided, a verbal understanding of all instructions was stated. IV removed, cathlon intact, site without s/s of infection. Pt preferred to walk out accompanied by mother VSS,  all belongings accounted for.

## 2019-12-03 ENCOUNTER — HOSPITAL ENCOUNTER (OUTPATIENT)
Dept: RADIATION ONCOLOGY | Facility: MEDICAL CENTER | Age: 24
End: 2019-12-31
Attending: RADIOLOGY
Payer: COMMERCIAL

## 2019-12-03 VITALS
OXYGEN SATURATION: 96 % | SYSTOLIC BLOOD PRESSURE: 129 MMHG | TEMPERATURE: 99 F | DIASTOLIC BLOOD PRESSURE: 83 MMHG | BODY MASS INDEX: 26.42 KG/M2 | HEART RATE: 62 BPM | WEIGHT: 189.4 LBS

## 2019-12-03 DIAGNOSIS — C34.90 MALIGNANT NEOPLASM OF UNSPECIFIED PART OF UNSPECIFIED BRONCHUS OR LUNG (HCC): ICD-10-CM

## 2019-12-03 DIAGNOSIS — C79.31 BRAIN METASTASES: ICD-10-CM

## 2019-12-03 PROCEDURE — 99215 OFFICE O/P EST HI 40 MIN: CPT | Performed by: RADIOLOGY

## 2019-12-03 PROCEDURE — 99212 OFFICE O/P EST SF 10 MIN: CPT | Performed by: RADIOLOGY

## 2019-12-03 ASSESSMENT — PAIN SCALES - GENERAL: PAINLEVEL: NO PAIN

## 2019-12-03 NOTE — NON-PROVIDER
Patient was seen today in clinic with Dr. Davis for follow up.  Vitals signs and weight were obtained and pain assessment was completed.  Allergies and medications were reviewed with the patient.  Review of systems completed.     Vitals/Pain:  Vitals:    12/03/19 1251   BP: 129/83   BP Location: Left arm   Patient Position: Sitting   BP Cuff Size: Adult   Pulse: 62   Temp: 37.2 °C (99 °F)   TempSrc: Temporal   SpO2: 96%   Weight: 85.9 kg (189 lb 6.4 oz)   Pain Score: No pain        Allergies:   Patient has no known allergies.    Current Medications:  Current Outpatient Medications   Medication Sig Dispense Refill   • dexamethasone (DECADRON) 2 MG tablet Take 1 Tab by mouth 2 times a day for 9 days. Take 1 tablet 3 times daily for 3 days then 1 tablet twice daily for 3 days then 1 tablet daily for 3 days and stop 18 Tab 0   • Alectinib HCl (ALECENSA) 150 MG Cap Take 600 mg by mouth 2 Times a Day.       No current facility-administered medications for this encounter.          PCP:  Heath Buenrostro, Med Ass't

## 2019-12-03 NOTE — PROGRESS NOTES
RADIATION ONCOLOGY FOLLOW-UP    DATE OF SERVICE: 12/3/2019    IDENTIFICATION:   A 24 y.o. 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 Stereotactic radio therapy to the right frontal excised brain metastasis and left cerebellar metastasis complete 1/10/2019.  Now status post radiation therapy to an additional right frontal lobe lesion complete 4/23/2019.  Here today because of seizure last week        HISTORY OF PRESENT ILLNESS:   Since last seen patient has had a Jacksonian type seizure on 11/27/2019.  MRI scan done that day showed mild interval increase in the extent of the previously seen right frontal and left cerebellar abnormal contrast-enhancement.  There is been an interval increase in the extent of the white matter edema.  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  He was placed on dexamethasone tapering dose and is seen in follow-up today.  Currently he is actually doing well.  He has no side effects other than anxiety about his situation and whether he can have another seizure.    CURRENT MEDICATIONS:  Current Outpatient Medications   Medication Sig Dispense Refill   • dexamethasone (DECADRON) 2 MG tablet Take 1 Tab by mouth 2 times a day for 9 days. Take 1 tablet 3 times daily for 3 days then 1 tablet twice daily for 3 days then 1 tablet daily for 3 days and stop 18 Tab 0   • Alectinib HCl (ALECENSA) 150 MG Cap Take 600 mg by mouth 2 Times a Day.       No current facility-administered medications for this encounter.        ALLERGIES:  Patient has no known allergies.    FAMILY HISTORY:    Family History   Problem Relation Age of Onset   • Heart Disease Mother    • Rheumatologic Disease Mother         aortitis   • Rheumatologic Disease Father    • Diabetes Maternal Grandfather    • Cancer Maternal Grandfather    • Stroke Maternal Grandmother    [unfilled]        SOCIAL HISTORY:     reports that he has never smoked. He has never used smokeless tobacco. He reports previous alcohol use. He  reports that he does not use drugs.        REVIEW OF SYSTEMS: Is significant for fatigue history of the recent seizure anxiety about getting another seizure and cough which is chronic.  The rest of the review of systems has been reviewed by me and is documented in the nursing note in [facility] dated 12/3/2019    PHYSICAL EXAM:     ECOG PERFORMANCE STATUS:  0= Fully active, able to carry on all pre-disease performance without restriction.       Vitals:    12/03/19 1251   BP: 129/83   BP Location: Left arm   Patient Position: Sitting   BP Cuff Size: Adult   Pulse: 62   Temp: 37.2 °C (99 °F)   TempSrc: Temporal   SpO2: 96%   Weight: 85.9 kg (189 lb 6.4 oz)   Pain Score: No pain        GENERAL: Well-appearing alert and oriented x3 somewhat anxious about his diagnosis  HEENT:  Pupils are equal, round, and reactive to light.  Extraocular muscles   are intact. Sclerae nonicteric.  Conjunctivae pink.  Oral cavity, tongue   protrudes midline.   NECK: No palpable nodes in the neck supra navicular axillary regions  LUNGS:  Clear to ascultation   HEART:  Regular rate and rhythm.  No murmur appreciated  ABDOMEN:  Soft. No evidence of hepatosplenomegaly.  Positive bowel sounds.  EXTREMITIES:  Without Edema.  NEUROLOGIC:  Cranial nerves II through XII were intact. Normal stance and gait motor and sensory grossly within normal limits          IMPRESSION:    A 24 y.o. 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 Stereotactic radio therapy to the right frontal excised brain metastasis and left cerebellar metastasis complete 1/10/2019.  Now status post radiation therapy to an additional right frontal lobe lesion complete 4/23/2019.  Now with recent Jacksonian seizure on 11/27/2019 on tapering doses of steroids with no further seizures.        RECOMMENDATIONS:   Caution with the patient and her son regarding his situation.  I am concerned that he needs to be on Keppra.  I put an urgent referral into neurology and I spoke with   Raffaele who agrees with the plan.  We are going to taper his steroids but he is can remain on 2 mg a day until I see him in 2 weeks.  I given them my cell phone to call if they have any further questions.  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  We will reimage in January the brain using a 3T MRI scan and we will order a PET CT scan for Dr. Castorena as well.  He will then see Dr. Castorena in follow-up after both scans are done and he will be seeing Dr. Feliz at [facility] the following day.  I explained to them that the treatment for radiation necrosis is steroids and if this does not work to consider Avastin.  Also sometimes surgery is indicated.  I told him to follow-up with Dr. Manzano so that he can be in the loop as far as everything that is going on.  I will also see him in follow-up as I said in 2 weeks and sooner as needed and then see him in follow-up after the MRI scan is done in January.      Thank you for the opportunity to participate in his care.  If any questions or comments, please do not hesitate in calling.      Please note that this dictation was created using voice recognition software. I have made every reasonable attempt to correct obvious errors, but I expect that there are errors of grammar and possibly content that I did not discover before finalizing the note.

## 2019-12-09 DIAGNOSIS — C79.31 BRAIN METASTASES: ICD-10-CM

## 2019-12-17 VITALS
WEIGHT: 192.28 LBS | BODY MASS INDEX: 26.82 KG/M2 | HEART RATE: 80 BPM | DIASTOLIC BLOOD PRESSURE: 87 MMHG | SYSTOLIC BLOOD PRESSURE: 138 MMHG | OXYGEN SATURATION: 97 % | TEMPERATURE: 98.8 F

## 2019-12-17 PROCEDURE — 99213 OFFICE O/P EST LOW 20 MIN: CPT | Performed by: RADIOLOGY

## 2019-12-17 PROCEDURE — 99212 OFFICE O/P EST SF 10 MIN: CPT | Performed by: RADIOLOGY

## 2019-12-17 RX ORDER — DEXAMETHASONE 4 MG/1
2 TABLET ORAL DAILY
COMMUNITY
End: 2020-03-13

## 2019-12-17 ASSESSMENT — PAIN SCALES - GENERAL: PAINLEVEL: NO PAIN

## 2019-12-17 NOTE — PROGRESS NOTES
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 Stereotactic radio therapy to the right frontal excised brain metastasis and left cerebellar metastasis complete 1/10/2019.  Now status post radiation therapy to an additional right frontal lobe lesion complete 4/23/2019.  Here today because of seizure last week

## 2019-12-17 NOTE — NON-PROVIDER
Patient was seen today in clinic with Dr. Davis for follow-up.  Vitals signs and weight were obtained and pain assessment was completed.  Allergies and medications were reviewed with the patient.  Review of systems completed.     Vitals/Pain:  Vitals:    12/17/19 1430   BP: 138/87   BP Location: Left arm   Patient Position: Sitting   BP Cuff Size: Adult   Pulse: 80   Temp: 37.1 °C (98.8 °F)   TempSrc: Temporal   SpO2: 97%   Weight: 87.2 kg (192 lb 4.5 oz)   Pain Score: No pain        Allergies:   Patient has no known allergies.    Current Medications:  Current Outpatient Medications   Medication Sig Dispense Refill   • dexamethasone (DECADRON) 4 MG Tab Take 2 mg by mouth every day.     • Alectinib HCl (ALECENSA) 150 MG Cap Take 600 mg by mouth 2 Times a Day.       No current facility-administered medications for this encounter.          PCP:  Heath Ramires R.N.

## 2020-01-07 ENCOUNTER — HOSPITAL ENCOUNTER (OUTPATIENT)
Dept: LAB | Facility: MEDICAL CENTER | Age: 25
End: 2020-01-07
Attending: INTERNAL MEDICINE
Payer: COMMERCIAL

## 2020-01-07 LAB
ALBUMIN SERPL BCP-MCNC: 4.6 G/DL (ref 3.2–4.9)
ALBUMIN/GLOB SERPL: 1.4 G/DL
ALP SERPL-CCNC: 80 U/L (ref 30–99)
ALT SERPL-CCNC: 28 U/L (ref 2–50)
ANION GAP SERPL CALC-SCNC: 13 MMOL/L (ref 0–11.9)
AST SERPL-CCNC: 17 U/L (ref 12–45)
BILIRUB SERPL-MCNC: 1 MG/DL (ref 0.1–1.5)
BUN SERPL-MCNC: 17 MG/DL (ref 8–22)
CALCIUM SERPL-MCNC: 9.7 MG/DL (ref 8.5–10.5)
CHLORIDE SERPL-SCNC: 105 MMOL/L (ref 96–112)
CK SERPL-CCNC: 52 U/L (ref 0–154)
CO2 SERPL-SCNC: 23 MMOL/L (ref 20–33)
CREAT SERPL-MCNC: 1.1 MG/DL (ref 0.5–1.4)
GLOBULIN SER CALC-MCNC: 3.3 G/DL (ref 1.9–3.5)
GLUCOSE SERPL-MCNC: 86 MG/DL (ref 65–99)
POTASSIUM SERPL-SCNC: 4.3 MMOL/L (ref 3.6–5.5)
PROT SERPL-MCNC: 7.9 G/DL (ref 6–8.2)
SODIUM SERPL-SCNC: 141 MMOL/L (ref 135–145)

## 2020-01-07 PROCEDURE — 82550 ASSAY OF CK (CPK): CPT

## 2020-01-07 PROCEDURE — 80053 COMPREHEN METABOLIC PANEL: CPT

## 2020-01-15 PROBLEM — C34.92 BRONCHOGENIC CANCER OF LEFT LUNG (HCC): Status: ACTIVE | Noted: 2020-01-15

## 2020-01-16 ENCOUNTER — HOSPITAL ENCOUNTER (OUTPATIENT)
Dept: RADIOLOGY | Facility: MEDICAL CENTER | Age: 25
End: 2020-01-16
Attending: RADIOLOGY
Payer: COMMERCIAL

## 2020-01-16 DIAGNOSIS — G93.9 LESION OF RIGHT FRONTAL LOBE OF BRAIN: ICD-10-CM

## 2020-01-16 DIAGNOSIS — C34.90 MALIGNANT NEOPLASM OF UNSPECIFIED PART OF UNSPECIFIED BRONCHUS OR LUNG (HCC): ICD-10-CM

## 2020-01-16 PROCEDURE — A9576 INJ PROHANCE MULTIPACK: HCPCS | Performed by: RADIOLOGY

## 2020-01-16 PROCEDURE — 70553 MRI BRAIN STEM W/O & W/DYE: CPT

## 2020-01-16 PROCEDURE — 700117 HCHG RX CONTRAST REV CODE 255: Performed by: RADIOLOGY

## 2020-01-16 PROCEDURE — A9552 F18 FDG: HCPCS

## 2020-01-16 RX ADMIN — GADOTERIDOL 17 ML: 279.3 INJECTION, SOLUTION INTRAVENOUS at 13:27

## 2020-01-17 ENCOUNTER — HOSPITAL ENCOUNTER (OUTPATIENT)
Dept: RADIATION ONCOLOGY | Facility: MEDICAL CENTER | Age: 25
End: 2020-01-31
Attending: RADIOLOGY
Payer: COMMERCIAL

## 2020-01-17 VITALS
TEMPERATURE: 98 F | DIASTOLIC BLOOD PRESSURE: 91 MMHG | BODY MASS INDEX: 27.87 KG/M2 | OXYGEN SATURATION: 96 % | WEIGHT: 194.7 LBS | HEART RATE: 65 BPM | HEIGHT: 70 IN | SYSTOLIC BLOOD PRESSURE: 143 MMHG

## 2020-01-17 DIAGNOSIS — C79.31 BRAIN METASTASES: ICD-10-CM

## 2020-01-17 PROCEDURE — 99212 OFFICE O/P EST SF 10 MIN: CPT | Performed by: RADIOLOGY

## 2020-01-17 PROCEDURE — 99213 OFFICE O/P EST LOW 20 MIN: CPT | Performed by: RADIOLOGY

## 2020-01-17 RX ORDER — LEVETIRACETAM 500 MG/1
500 TABLET ORAL
COMMUNITY
Start: 2020-01-15 | End: 2020-07-14

## 2020-01-17 ASSESSMENT — PAIN SCALES - GENERAL: PAINLEVEL: NO PAIN

## 2020-01-17 NOTE — NON-PROVIDER
"Patient was seen today in clinic with Dr. Davis for follow up.  Vitals signs and weight were obtained and pain assessment was completed.  Allergies and medications were reviewed with the patient.  Review of systems completed.     Vitals/Pain:  Vitals:    01/17/20 1426   BP: 143/91   BP Location: Right arm   Patient Position: Sitting   BP Cuff Size: Adult   Pulse: 65   Temp: 36.7 °C (98 °F)   TempSrc: Temporal   SpO2: 96%   Weight: 88.3 kg (194 lb 11.2 oz)   Height: 1.778 m (5' 10\")   Pain Score: No pain        Allergies:   Patient has no known allergies.    Current Medications:  Current Outpatient Medications   Medication Sig Dispense Refill   • levETIRAcetam (KEPPRA) 500 MG Tab Take 500 mg by mouth.     • dexamethasone (DECADRON) 4 MG Tab Take 2 mg by mouth every day.     • Alectinib HCl (ALECENSA) 150 MG Cap Take 600 mg by mouth 2 Times a Day.       No current facility-administered medications for this encounter.          PCP:  Heath Buenrostro, Med Ass't    "

## 2020-02-28 ENCOUNTER — OUTPATIENT INFUSION SERVICES (OUTPATIENT)
Dept: ONCOLOGY | Facility: MEDICAL CENTER | Age: 25
End: 2020-02-28
Attending: INTERNAL MEDICINE
Payer: COMMERCIAL

## 2020-02-28 ENCOUNTER — DOCUMENTATION (OUTPATIENT)
Dept: NUTRITION | Facility: MEDICAL CENTER | Age: 25
End: 2020-02-28

## 2020-02-28 VITALS
DIASTOLIC BLOOD PRESSURE: 82 MMHG | RESPIRATION RATE: 18 BRPM | TEMPERATURE: 97.4 F | OXYGEN SATURATION: 95 % | HEIGHT: 70 IN | SYSTOLIC BLOOD PRESSURE: 128 MMHG | BODY MASS INDEX: 27.9 KG/M2 | HEART RATE: 75 BPM | WEIGHT: 194.89 LBS

## 2020-02-28 DIAGNOSIS — C34.92 BRONCHOGENIC CANCER OF LEFT LUNG (HCC): ICD-10-CM

## 2020-02-28 DIAGNOSIS — G93.9 LESION OF RIGHT FRONTAL LOBE OF BRAIN: ICD-10-CM

## 2020-02-28 DIAGNOSIS — I67.89 NECROSIS OF BRAIN DUE TO RADIATION THERAPY: ICD-10-CM
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
## 2020-02-28 LAB
ALBUMIN SERPL BCP-MCNC: 4.2 G/DL (ref 3.2–4.9)
ALBUMIN/GLOB SERPL: 1.4 G/DL
ALP SERPL-CCNC: 49 U/L (ref 30–99)
ALT SERPL-CCNC: 26 U/L (ref 2–50)
ANION GAP SERPL CALC-SCNC: 12 MMOL/L (ref 0–11.9)
ANISOCYTOSIS BLD QL SMEAR: ABNORMAL
APPEARANCE UR: CLEAR
AST SERPL-CCNC: 16 U/L (ref 12–45)
BASOPHILS # BLD AUTO: 0 % (ref 0–1.8)
BASOPHILS # BLD: 0 K/UL (ref 0–0.12)
BILIRUB SERPL-MCNC: 1.4 MG/DL (ref 0.1–1.5)
BILIRUB UR QL STRIP.AUTO: NEGATIVE
BUN SERPL-MCNC: 25 MG/DL (ref 8–22)
BURR CELLS BLD QL SMEAR: NORMAL
CALCIUM SERPL-MCNC: 9.3 MG/DL (ref 8.5–10.5)
CHLORIDE SERPL-SCNC: 99 MMOL/L (ref 96–112)
CK SERPL-CCNC: 27 U/L (ref 0–154)
CO2 SERPL-SCNC: 25 MMOL/L (ref 20–33)
COLOR UR: YELLOW
CREAT SERPL-MCNC: 1.06 MG/DL (ref 0.5–1.4)
EOSINOPHIL # BLD AUTO: 0 K/UL (ref 0–0.51)
EOSINOPHIL NFR BLD: 0 % (ref 0–6.9)
ERYTHROCYTE [DISTWIDTH] IN BLOOD BY AUTOMATED COUNT: 46.5 FL (ref 35.9–50)
GLOBULIN SER CALC-MCNC: 3.1 G/DL (ref 1.9–3.5)
GLUCOSE SERPL-MCNC: 138 MG/DL (ref 65–99)
GLUCOSE UR STRIP.AUTO-MCNC: NEGATIVE MG/DL
HCT VFR BLD AUTO: 42.7 % (ref 42–52)
HGB BLD-MCNC: 15.4 G/DL (ref 14–18)
KETONES UR STRIP.AUTO-MCNC: NEGATIVE MG/DL
LEUKOCYTE ESTERASE UR QL STRIP.AUTO: NEGATIVE
LYMPHOCYTES # BLD AUTO: 1.18 K/UL (ref 1–4.8)
LYMPHOCYTES NFR BLD: 10.7 % (ref 22–41)
MANUAL DIFF BLD: NORMAL
MCH RBC QN AUTO: 31.4 PG (ref 27–33)
MCHC RBC AUTO-ENTMCNC: 36.1 G/DL (ref 33.7–35.3)
MCV RBC AUTO: 87 FL (ref 81.4–97.8)
MICRO URNS: NORMAL
MICROCYTES BLD QL SMEAR: ABNORMAL
MONOCYTES # BLD AUTO: 0.3 K/UL (ref 0–0.85)
MONOCYTES NFR BLD AUTO: 2.7 % (ref 0–13.4)
MORPHOLOGY BLD-IMP: NORMAL
NEUTROPHILS # BLD AUTO: 9.53 K/UL (ref 1.82–7.42)
NEUTROPHILS NFR BLD: 86.6 % (ref 44–72)
NITRITE UR QL STRIP.AUTO: NEGATIVE
NRBC # BLD AUTO: 0 K/UL
NRBC BLD-RTO: 0 /100 WBC
PH UR STRIP.AUTO: 5 [PH] (ref 5–8)
PLATELET # BLD AUTO: 212 K/UL (ref 164–446)
PLATELET BLD QL SMEAR: NORMAL
PMV BLD AUTO: 8.9 FL (ref 9–12.9)
POIKILOCYTOSIS BLD QL SMEAR: NORMAL
POTASSIUM SERPL-SCNC: 3.8 MMOL/L (ref 3.6–5.5)
PROT SERPL-MCNC: 7.3 G/DL (ref 6–8.2)
PROT UR QL STRIP: NEGATIVE MG/DL
RBC # BLD AUTO: 4.91 M/UL (ref 4.7–6.1)
RBC BLD AUTO: PRESENT
RBC UR QL AUTO: NEGATIVE
SODIUM SERPL-SCNC: 136 MMOL/L (ref 135–145)
SP GR UR STRIP.AUTO: 1.02
SPHEROCYTES BLD QL SMEAR: NORMAL
UROBILINOGEN UR STRIP.AUTO-MCNC: 1 MG/DL
WBC # BLD AUTO: 11 K/UL (ref 4.8–10.8)

## 2020-02-28 PROCEDURE — 81003 URINALYSIS AUTO W/O SCOPE: CPT

## 2020-02-28 PROCEDURE — 96415 CHEMO IV INFUSION ADDL HR: CPT

## 2020-02-28 PROCEDURE — 700105 HCHG RX REV CODE 258: Performed by: INTERNAL MEDICINE

## 2020-02-28 PROCEDURE — 700111 HCHG RX REV CODE 636 W/ 250 OVERRIDE (IP): Performed by: INTERNAL MEDICINE

## 2020-02-28 PROCEDURE — 85007 BL SMEAR W/DIFF WBC COUNT: CPT

## 2020-02-28 PROCEDURE — 80053 COMPREHEN METABOLIC PANEL: CPT

## 2020-02-28 PROCEDURE — 85027 COMPLETE CBC AUTOMATED: CPT

## 2020-02-28 PROCEDURE — 96413 CHEMO IV INFUSION 1 HR: CPT

## 2020-02-28 PROCEDURE — 82550 ASSAY OF CK (CPK): CPT

## 2020-02-28 RX ORDER — 0.9 % SODIUM CHLORIDE 0.9 %
3 VIAL (ML) INJECTION PRN
Status: CANCELLED | OUTPATIENT
Start: 2020-02-28

## 2020-02-28 RX ORDER — SODIUM CHLORIDE 9 MG/ML
INJECTION, SOLUTION INTRAVENOUS CONTINUOUS
Status: CANCELLED | OUTPATIENT
Start: 2020-02-28

## 2020-02-28 RX ORDER — 0.9 % SODIUM CHLORIDE 0.9 %
10 VIAL (ML) INJECTION PRN
Status: CANCELLED | OUTPATIENT
Start: 2020-02-28

## 2020-02-28 RX ORDER — 0.9 % SODIUM CHLORIDE 0.9 %
VIAL (ML) INJECTION PRN
Status: CANCELLED | OUTPATIENT
Start: 2020-02-28

## 2020-02-28 RX ORDER — PROCHLORPERAZINE MALEATE 10 MG
10 TABLET ORAL EVERY 6 HOURS PRN
Status: CANCELLED | OUTPATIENT
Start: 2020-02-28

## 2020-02-28 RX ORDER — ONDANSETRON 2 MG/ML
4 INJECTION INTRAMUSCULAR; INTRAVENOUS PRN
Status: CANCELLED | OUTPATIENT
Start: 2020-02-28

## 2020-02-28 RX ORDER — ONDANSETRON 8 MG/1
8 TABLET, ORALLY DISINTEGRATING ORAL PRN
Status: CANCELLED | OUTPATIENT
Start: 2020-02-28

## 2020-02-28 RX ADMIN — BEVACIZUMAB 400 MG: 400 INJECTION, SOLUTION INTRAVENOUS at 12:50

## 2020-02-28 ASSESSMENT — FIBROSIS 4 INDEX: FIB4 SCORE: 0.28

## 2020-02-28 NOTE — PROGRESS NOTES
Chemotherapy Verification - SECONDARY RN       Height = 179 cm  Weight = 88.4 kg  BSA = 2.09 m2       Medication: Avastin  Dose:  5 mg/kg  Calculated Dose: 442 mg                             (In mg/m2, AUC, mg/kg)       I confirm that this process was performed independently.

## 2020-02-28 NOTE — PROGRESS NOTES
Nutrition Services: RD Consultation/ New Chemo Start  24 year old male with diagnosis of lung cancer with brain mets s/p radiation therapy.    Past Medical History:   Diagnosis Date   • Cancer (HCC)     non-small cell lung       RD met with pt to assess current intake, appetite, and nutritional status.   Pt appears nourished.  He notes that his weight and appetite have remained stable throughout his treatment.  He notes his weight today is what he typically weighs and has noticed no changes to weight.  Currently he weighs 194 pounds/88.4 kg.     We discussed nutrition related goals during treatment as well and RD role. We discussed the potential side effects of treatment and their impacts on nutrition. We discussed the benefit of small, frequent meals and snacks focusing on high calorie/protein items as well as the benefits of maintaining weight and lean muscle mass throughout treatment. We discussed increased nutrition needs and hypermetabolic effects of cancer. Provided and went over handouts/food lists regarding a general healthy diet, healthy snack ideas, foods high in protein and the benefits of a plant based diet.  Pt did not express any further nutrition-related questions or concerns at this time.     Assessment:  • Pertinent Labs: Bun: 25  • Pertinent Meds: Keppra, decadron, alecensa  • Weight: 194 pounds/88.4 kg  • Height: 5'10.5''  • BMI: 27    Plan/Recommendations:  • Provided and discussed handout regarding general nutrition guidelines as well as nutritional recommendations for patient's with lung cancer, including tips/tricks should side effects of tx occur.   • Incorporate high protein foods, fruits and vegetables with all meals/snacks    Pt reports understanding and was receptive to information provided.  RD provided contact information. Encouraged pt to reach out as questions/concerns arise.   RD following and to make further recommendations as indicated.  [phone]

## 2020-02-28 NOTE — PROGRESS NOTES
"Pharmacy Chemotherapy Calculation:    Patient name: Keanu Trivedi  DX: lung cancer metastatic to brain    Cycle: 1      Previous treatment = radiation (CNS) and alectinib (lung)    Protocol:  Avastin for radiation necrosis  Bevacizumab 5-15 mg/kg IV on Day 1  Every 14 days - MD is planning 4 treatments  NCCN Guidelines; Central Nervous System Cancers V1.2019.   Misael OCAMPO, et al. Bevacizumab as a treatment for radiation necrosis of brain metastases post stereotactic radiosurgery. Neuro-Oncology. 2013;15(9):3958-6717.  Stef DEL CASTILLO, et al. Bevacizumab for refractory adverse radiation effects after stereotactic radiosurgery. J Neurooncol. 2013;115:217-233.  Jerry X, et al. Bevacizumab monotherapy reduces radiation-induced brain necrosis in nasopharyngeal carcinoma patients: a randomized controlled trial. Int J Radiation Oncol Biol Phys. 2018;101(5):8847-1661.      Allergies:  Patient has no known allergies.    /82   Pulse 75   Temp 36.3 °C (97.4 °F) (Temporal)   Resp 18   Ht 1.79 m (5' 10.47\")   Wt 88.4 kg (194 lb 14.2 oz)   SpO2 95%   BMI 27.59 kg/m²   Body surface area is 2.1 meters squared.    Labs 2/28/20  ANC ~ 9500        Plt = 212k       Hgb = 15.4     Scr =  1.06      Est CrCl >125 ml/min      LFTs = 16/26/49  Tbili = 1.4    BP = 128/82    Urine protein = negative    Bevacizumab 5 mg/kg x 88.4 kg = 442 mg   <10% difference, rounded to vial size per protocol = 400 mg IV         Temo Short, PharmD      "

## 2020-02-28 NOTE — PROGRESS NOTES
into [facility] for Cycle 1 of Avastin for Bronchogenic cancer of left lung with Brain mets. Pt denied having any new or acute complaints today. PIV started, had + blood return flushed briskly. Pt given Bevacizumab as prescribed, tolerated well, denied having any complaints during or after infusion. PIV discontinued, bleeding controlled with gauze and coban. Discharge home with sister in good condition and in NAD. Appointment confirm for next treatment.

## 2020-02-28 NOTE — PROGRESS NOTES
Chemotherapy Verification - PRIMARY RN      Height = 70.47 in  Weight = 88.4 kg  BSA = 2.1 m2       Medication: Avastin  Dose: 5 mg/kg  Calculated Dose: 442 mg                             (In mg/m2, AUC, mg/kg)     I confirm this process was performed independently with the BSA and all final chemotherapy dosing calculations congruent.  Any discrepancies of 10% or greater have been addressed with the chemotherapy pharmacist. The resolution of the discrepancy has been documented in the EPIC progress notes.

## 2020-02-28 NOTE — PROGRESS NOTES
"Pharmacy Chemotherapy Calculation    Dx: Metastatic Adenocarcinoma of the lung to brain (Radiation Necrosis)  Protocol: Bevacizumab      *Dosing Reference*  Bevacizumab 5-15 mg/kg IV every 2 weeks    NCCN Guidelines; Central Nervous System Cancers V1.2019. Guideline   Misael OCAMPO et al. Bevacizumab as a treatment for radiation necrosis of brain metastases post stereotactic radiosurgery. Neuro-Oncology. 2013;15(9):2210-4280.  Stef DEL CASTILLO, et al. Bevacizumab for refractory adverse radiation effects after stereotactic radiosurgery. J Neurooncol. 2013;115:217-233.  Jerry CHARLES, et al. Bevacizumab monotherapy reduces radiation-induced brain necrosis in nasopharyngeal carcinoma patients: a randomized controlled trial. Int J Radiation Oncol Biol Phys. 2018;101(5):2569-0254.    Allergies:  Patient has no known allergies.     /82   Pulse 75   Temp 36.3 °C (97.4 °F) (Temporal)   Resp 18   Ht 1.79 m (5' 10.47\")   Wt 88.4 kg (194 lb 14.2 oz)   SpO2 95%   BMI 27.59 kg/m²  Body surface area is 2.1 meters squared.    Vitals 2/28/20:  BP = 128/82  Labs 2/28/20:  ANC~ 9530 Plt = 212k   Hgb = 15.4     SCr = 1.06 mg/dL CrCl > 125 mL/min   LFT's = WNL TBili = 1.4   Urine Protein = Negative    Drug Order   (Drug name, dose, route, IV Fluid & volume, frequency, number of doses) Cycle 1      Previous treatment: n/a     Medication = Bevacizumab (Avastin)  Base Dose= 5 mg/kg  Calc Dose: Base Dose x 88.4 kg = 442 mg  Final Dose = 400 mg  Route = IV  Fluid & Volume =  mL  Admin Duration = Over 90 minutes          Rounded to nearest vial size (<10% difference) per protocol, okay to treat with final dose     By my signature below, I confirm this process was performed independently with the BSA and all final chemotherapy dosing calculations congruent. I have reviewed the above chemotherapy order and that my calculation of the final dose and BSA (when applicable) corroborate those calculations of the  pharmacist. " Discrepancies of 10% or greater in the written dose have been addressed and documented within the EPIC Progress notes.    Jaime Meadows, PharmD

## 2020-03-12 ENCOUNTER — TELEPHONE (OUTPATIENT)
Dept: ONCOLOGY | Facility: MEDICAL CENTER | Age: 25
End: 2020-03-12

## 2020-03-13 ENCOUNTER — OUTPATIENT INFUSION SERVICES (OUTPATIENT)
Dept: ONCOLOGY | Facility: MEDICAL CENTER | Age: 25
End: 2020-03-13
Attending: INTERNAL MEDICINE
Payer: COMMERCIAL

## 2020-03-13 VITALS
TEMPERATURE: 99.2 F | DIASTOLIC BLOOD PRESSURE: 89 MMHG | BODY MASS INDEX: 27.9 KG/M2 | WEIGHT: 194.89 LBS | HEIGHT: 70 IN | RESPIRATION RATE: 18 BRPM | OXYGEN SATURATION: 97 % | HEART RATE: 78 BPM | SYSTOLIC BLOOD PRESSURE: 130 MMHG

## 2020-03-13 DIAGNOSIS — I67.89 NECROSIS OF BRAIN DUE TO RADIATION THERAPY: ICD-10-CM
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
## 2020-03-13 DIAGNOSIS — C34.92 BRONCHOGENIC CANCER OF LEFT LUNG (HCC): ICD-10-CM

## 2020-03-13 PROCEDURE — 700111 HCHG RX REV CODE 636 W/ 250 OVERRIDE (IP): Performed by: INTERNAL MEDICINE

## 2020-03-13 PROCEDURE — 96413 CHEMO IV INFUSION 1 HR: CPT

## 2020-03-13 PROCEDURE — 700105 HCHG RX REV CODE 258: Performed by: INTERNAL MEDICINE

## 2020-03-13 RX ORDER — 0.9 % SODIUM CHLORIDE 0.9 %
VIAL (ML) INJECTION PRN
Status: CANCELLED | OUTPATIENT
Start: 2020-03-13

## 2020-03-13 RX ORDER — 0.9 % SODIUM CHLORIDE 0.9 %
10 VIAL (ML) INJECTION PRN
Status: CANCELLED | OUTPATIENT
Start: 2020-03-13

## 2020-03-13 RX ORDER — LEVETIRACETAM 500 MG/1
500 TABLET ORAL
COMMUNITY
End: 2020-03-13

## 2020-03-13 RX ORDER — SODIUM CHLORIDE 9 MG/ML
INJECTION, SOLUTION INTRAVENOUS CONTINUOUS
Status: CANCELLED | OUTPATIENT
Start: 2020-03-13

## 2020-03-13 RX ORDER — DEXAMETHASONE 1 MG
1 TABLET ORAL
COMMUNITY
Start: 2020-02-11 | End: 2020-07-14

## 2020-03-13 RX ORDER — ONDANSETRON 8 MG/1
8 TABLET, ORALLY DISINTEGRATING ORAL PRN
Status: CANCELLED | OUTPATIENT
Start: 2020-03-13

## 2020-03-13 RX ORDER — DEXAMETHASONE 2 MG/1
2 TABLET ORAL
COMMUNITY
Start: 2020-02-26 | End: 2020-03-13

## 2020-03-13 RX ORDER — DEXAMETHASONE 2 MG/1
TABLET ORAL
COMMUNITY
Start: 2020-02-26 | End: 2020-03-13

## 2020-03-13 RX ORDER — 0.9 % SODIUM CHLORIDE 0.9 %
3 VIAL (ML) INJECTION PRN
Status: CANCELLED | OUTPATIENT
Start: 2020-03-13

## 2020-03-13 RX ORDER — SODIUM CHLORIDE 9 MG/ML
INJECTION, SOLUTION INTRAVENOUS CONTINUOUS
Status: DISCONTINUED | OUTPATIENT
Start: 2020-03-13 | End: 2020-03-13 | Stop reason: HOSPADM

## 2020-03-13 RX ORDER — ALECTINIB HYDROCHLORIDE 150 MG/1
600 CAPSULE ORAL
COMMUNITY
End: 2020-03-13

## 2020-03-13 RX ORDER — ONDANSETRON 2 MG/ML
4 INJECTION INTRAMUSCULAR; INTRAVENOUS PRN
Status: CANCELLED | OUTPATIENT
Start: 2020-03-13

## 2020-03-13 RX ORDER — PROCHLORPERAZINE MALEATE 10 MG
10 TABLET ORAL EVERY 6 HOURS PRN
Status: CANCELLED | OUTPATIENT
Start: 2020-03-13

## 2020-03-13 RX ADMIN — SODIUM CHLORIDE: 9 INJECTION, SOLUTION INTRAVENOUS at 11:57

## 2020-03-13 RX ADMIN — BEVACIZUMAB 400 MG: 400 INJECTION, SOLUTION INTRAVENOUS at 14:04

## 2020-03-13 ASSESSMENT — FIBROSIS 4 INDEX: FIB4 SCORE: 0.48

## 2020-03-13 NOTE — PROGRESS NOTES
"Pharmacy Chemotherapy Calculation:    Patient name: Keanu Trivedi  DX: lung cancer metastatic to brain    Cycle: 2  Previous treatment = C1 on 2/28/20    Protocol:  Avastin for radiation necrosis  Bevacizumab 5-15 mg/kg IV on Day 1  Every 14 days - MD is planning 4 treatments  NCCN Guidelines; Central Nervous System Cancers V1.2019.   Misael OCAMPO, et al. Bevacizumab as a treatment for radiation necrosis of brain metastases post stereotactic radiosurgery. Neuro-Oncology. 2013;15(9):9881-4741.  Stef DEL CASTILLO, et al. Bevacizumab for refractory adverse radiation effects after stereotactic radiosurgery. J Neurooncol. 2013;115:217-233.  Jerry CHARLES, et al. Bevacizumab monotherapy reduces radiation-induced brain necrosis in nasopharyngeal carcinoma patients: a randomized controlled trial. Int J Radiation Oncol Biol Phys. 2018;101(5):3310-8438.    Allergies:  Patient has no known allergies.    /89   Pulse 78   Temp 37.3 °C (99.2 °F) (Temporal)   Resp 18   Ht 1.79 m (5' 10.47\")   Wt 88.4 kg (194 lb 14.2 oz)   SpO2 97%   BMI 27.59 kg/m²   Body surface area is 2.1 meters squared.    Labs 3/12/20 ([facility])  ANC~ 4480 Plt = 147k   Hgb = 14.9     SCr = 1.18 mg/dL CrCl ~ 119.7 mL/min   LFT's = WNLs  TBili = 1.1   Urine Protein = 12.4 mg/dL (Trace)     Vitals 3/13/20  BP = 130/89    Bevacizumab 5 mg/kg x 88.4 kg = 442 mg   <10% difference, rounded to vial size per protocol = 400 mg IV     Deondre Brown, PharmD    "

## 2020-03-13 NOTE — PROGRESS NOTES
Pt arrived to [facility] ambulatory, here with mother for C2 Avastin for metastatic lung cancer. IV access established. Pt had labs done ahead of appt. Results reviewed and WNL for chemo however, BP elevated. After several rechecks, BP qualified for chemo to proceed. Avastin infused over one hour per MAR. Pt wilfred well. Line flushed clear. IV flushed and removed. Pressure dressing applied. Pt has future appt in place but is planning on moving to [place] on Monday 3/16. Pt will cancel appt on 3/27 if he is able to establish care in [place] as planned. Pt discharged home under care of mother in no apparent distress.

## 2020-03-13 NOTE — PROGRESS NOTES
"Pharmacy Chemotherapy Calculation    Dx: Metastatic Adenocarcinoma of the lung to brain (Radiation Necrosis)  Protocol: Bevacizumab      *Dosing Reference*  Bevacizumab 5-15 mg/kg IV every 2 weeks    NCCN Guidelines; Central Nervous System Cancers V1.2019. Guideline   Misael OCAMPO et al. Bevacizumab as a treatment for radiation necrosis of brain metastases post stereotactic radiosurgery. Neuro-Oncology. 2013;15(9):7769-0534.  Stef DEL CASTILLO, et al. Bevacizumab for refractory adverse radiation effects after stereotactic radiosurgery. J Neurooncol. 2013;115:217-233.  Jerry CHARLES, et al. Bevacizumab monotherapy reduces radiation-induced brain necrosis in nasopharyngeal carcinoma patients: a randomized controlled trial. Int J Radiation Oncol Biol Phys. 2018;101(5):8784-9427.    Allergies:  Patient has no known allergies.     /89   Pulse 78   Temp 37.3 °C (99.2 °F) (Temporal)   Resp 18   Ht 1.79 m (5' 10.47\")   Wt 88.4 kg (194 lb 14.2 oz)   SpO2 97%   BMI 27.59 kg/m²  Body surface area is 2.1 meters squared.  Vitals 3/13/20:  BP = 130/89  Labs 3/12/20:  ANC~ 4480 Plt = 147k   Hgb = 14.9     SCr = 1.18 mg/dL CrCl ~ 121 mL/min   LFT's = WNL TBili = 1.1   Urine Protein = Trace    Drug Order   (Drug name, dose, route, IV Fluid & volume, frequency, number of doses) Cycle 2  Previous treatment: C1 2/28/20     Medication = Bevacizumab (Avastin)  Base Dose= 5 mg/kg  Calc Dose: Base Dose x 88.4 kg = 442 mg  Final Dose = 400 mg  Route = IV  Fluid & Volume =  mL  Admin Duration = Over 90 minutes          Rounded to nearest vial size (<10% difference) per protocol, okay to treat with final dose     By my signature below, I confirm this process was performed independently with the BSA and all final chemotherapy dosing calculations congruent. I have reviewed the above chemotherapy order and that my calculation of the final dose and BSA (when applicable) corroborate those calculations of the  pharmacist. " Discrepancies of 10% or greater in the written dose have been addressed and documented within the EPIC Progress notes.    Jaime Meadows, PharmD

## 2020-03-13 NOTE — PROGRESS NOTES
Chemotherapy Verification - PRIMARY RN    C2    Height = 179 cm  Weight = 88.4 kg  BSA = 2.1 m2       Medication: Bevacizumab (Avastin)  Dose: 5 mg/kg  Calculated Dose: 442 mg                               I confirm this process was performed independently with the BSA and all final chemotherapy dosing calculations congruent.  Any discrepancies of 10% or greater have been addressed with the chemotherapy pharmacist. The resolution of the discrepancy has been documented in the EPIC progress notes.

## 2020-03-13 NOTE — PROGRESS NOTES
Chemotherapy Verification - SECONDARY RN       Height = 179 cm  Weight = 88.4 kg  BSA = 2.1 m2       Medication: Avastin  Dose: 5 mg/kg  Calculated Dose: 442 mg round to vial                             (In mg/m2, AUC, mg/kg)     I confirm that this process was performed independently.

## 2020-03-27 ENCOUNTER — APPOINTMENT (OUTPATIENT)
Dept: ONCOLOGY | Facility: MEDICAL CENTER | Age: 25
End: 2020-03-27
Attending: INTERNAL MEDICINE
Payer: COMMERCIAL

## 2020-04-23 ENCOUNTER — APPOINTMENT (OUTPATIENT)
Dept: RADIOLOGY | Facility: MEDICAL CENTER | Age: 25
End: 2020-04-23
Attending: RADIOLOGY
Payer: COMMERCIAL

## 2020-05-15 ENCOUNTER — PATIENT OUTREACH (OUTPATIENT)
Dept: OTHER | Facility: MEDICAL CENTER | Age: 25
End: 2020-05-15

## 2020-07-14 ENCOUNTER — APPOINTMENT (OUTPATIENT)
Dept: RADIOLOGY | Facility: MEDICAL CENTER | Age: 25
End: 2020-07-14
Attending: EMERGENCY MEDICINE
Payer: COMMERCIAL

## 2020-07-14 ENCOUNTER — HOSPITAL ENCOUNTER (EMERGENCY)
Facility: MEDICAL CENTER | Age: 25
End: 2020-07-14
Attending: EMERGENCY MEDICINE
Payer: COMMERCIAL

## 2020-07-14 VITALS
WEIGHT: 179.68 LBS | OXYGEN SATURATION: 98 % | DIASTOLIC BLOOD PRESSURE: 94 MMHG | HEIGHT: 71 IN | TEMPERATURE: 98.4 F | SYSTOLIC BLOOD PRESSURE: 138 MMHG | RESPIRATION RATE: 16 BRPM | BODY MASS INDEX: 25.15 KG/M2 | HEART RATE: 70 BPM

## 2020-07-14 DIAGNOSIS — R29.818 FOCAL NEUROLOGICAL DEFICIT: ICD-10-CM

## 2020-07-14 DIAGNOSIS — R20.2 PARESTHESIAS: ICD-10-CM

## 2020-07-14 LAB
ALBUMIN SERPL BCP-MCNC: 4.3 G/DL (ref 3.2–4.9)
ALBUMIN/GLOB SERPL: 1.3 G/DL
ALP SERPL-CCNC: 91 U/L (ref 30–99)
ALT SERPL-CCNC: 18 U/L (ref 2–50)
ANION GAP SERPL CALC-SCNC: 12 MMOL/L (ref 7–16)
AST SERPL-CCNC: 22 U/L (ref 12–45)
BASOPHILS # BLD AUTO: 0.4 % (ref 0–1.8)
BASOPHILS # BLD: 0.02 K/UL (ref 0–0.12)
BILIRUB SERPL-MCNC: 0.5 MG/DL (ref 0.1–1.5)
BUN SERPL-MCNC: 15 MG/DL (ref 8–22)
CALCIUM SERPL-MCNC: 9.2 MG/DL (ref 8.5–10.5)
CHLORIDE SERPL-SCNC: 101 MMOL/L (ref 96–112)
CO2 SERPL-SCNC: 26 MMOL/L (ref 20–33)
CREAT SERPL-MCNC: 0.92 MG/DL (ref 0.5–1.4)
EOSINOPHIL # BLD AUTO: 0.15 K/UL (ref 0–0.51)
EOSINOPHIL NFR BLD: 3.3 % (ref 0–6.9)
ERYTHROCYTE [DISTWIDTH] IN BLOOD BY AUTOMATED COUNT: 38.6 FL (ref 35.9–50)
GLOBULIN SER CALC-MCNC: 3.3 G/DL (ref 1.9–3.5)
GLUCOSE SERPL-MCNC: 93 MG/DL (ref 65–99)
HCT VFR BLD AUTO: 42.9 % (ref 42–52)
HGB BLD-MCNC: 14.4 G/DL (ref 14–18)
IMM GRANULOCYTES # BLD AUTO: 0.02 K/UL (ref 0–0.11)
IMM GRANULOCYTES NFR BLD AUTO: 0.4 % (ref 0–0.9)
LYMPHOCYTES # BLD AUTO: 1.79 K/UL (ref 1–4.8)
LYMPHOCYTES NFR BLD: 39.3 % (ref 22–41)
MCH RBC QN AUTO: 28.7 PG (ref 27–33)
MCHC RBC AUTO-ENTMCNC: 33.6 G/DL (ref 33.7–35.3)
MCV RBC AUTO: 85.6 FL (ref 81.4–97.8)
MONOCYTES # BLD AUTO: 0.3 K/UL (ref 0–0.85)
MONOCYTES NFR BLD AUTO: 6.6 % (ref 0–13.4)
NEUTROPHILS # BLD AUTO: 2.28 K/UL (ref 1.82–7.42)
NEUTROPHILS NFR BLD: 50 % (ref 44–72)
NRBC # BLD AUTO: 0 K/UL
NRBC BLD-RTO: 0 /100 WBC
PLATELET # BLD AUTO: 239 K/UL (ref 164–446)
PMV BLD AUTO: 8.9 FL (ref 9–12.9)
POTASSIUM SERPL-SCNC: 3.4 MMOL/L (ref 3.6–5.5)
PROT SERPL-MCNC: 7.6 G/DL (ref 6–8.2)
RBC # BLD AUTO: 5.01 M/UL (ref 4.7–6.1)
SODIUM SERPL-SCNC: 139 MMOL/L (ref 135–145)
WBC # BLD AUTO: 4.6 K/UL (ref 4.8–10.8)

## 2020-07-14 PROCEDURE — 99284 EMERGENCY DEPT VISIT MOD MDM: CPT

## 2020-07-14 PROCEDURE — 70551 MRI BRAIN STEM W/O DYE: CPT

## 2020-07-14 PROCEDURE — 85025 COMPLETE CBC W/AUTO DIFF WBC: CPT

## 2020-07-14 PROCEDURE — 80053 COMPREHEN METABOLIC PANEL: CPT

## 2020-07-14 RX ORDER — AMOXICILLIN 250 MG/1
250 CAPSULE ORAL 3 TIMES DAILY
COMMUNITY
End: 2020-07-14

## 2020-07-14 RX ORDER — LEVETIRACETAM 500 MG/1
500 TABLET ORAL 2 TIMES DAILY
COMMUNITY
Start: 2020-05-04 | End: 2021-05-04

## 2020-07-14 ASSESSMENT — FIBROSIS 4 INDEX: FIB4 SCORE: 0.5

## 2020-07-14 NOTE — ED TRIAGE NOTES
Chief Complaint   Patient presents with   • Numbness     lasted ten minutes, since resolved. left side of cheek and arm down to hand. hx of brain tumor with necrosis. currently on oral chemo.    • Weakness     left sided     Pt ambulatory to triage for above complaint.     Pt is alert/oriented and follows commands. Pt speaking in full sentences and responds appropriately to questions. No acute distress noted in triage and respirations are even and unlabored.     Charge RN aware of pt.  Pt roomed immediately.

## 2020-07-14 NOTE — ED PROVIDER NOTES
ED Provider Note    Scribed for Dr. Dom Bertrand M.D. by Hollis Lucas. 7/14/2020  2:48 PM    Primary care provider: KIAN Rod  Means of arrival: Walk-In  History obtained from: Patient  History limited by: None    CHIEF COMPLAINT  Chief Complaint   Patient presents with   • Numbness     lasted ten minutes, since resolved. left side of cheek and arm down to hand. hx of brain tumor with necrosis. currently on oral chemo.    • Weakness     left sided       HPI  Keanu Trivedi is a 25 y.o. male who presents to the Emergency Department for evaluation of numbness on his left side of his body onset today. Patient states that he was on a flight to [place], [place] to [place] yesterday when he experienced a headache. He states that then today prior to arrival, he experienced numbness in his cheek which lasted for approximately 10 minutes. He then experienced left hand numbness, especially in the palmar area, which also lasted for only 10 minutes. Currently in the ED, the patient has no symptoms. Denies any chest pain or fever. Patient adds that he has a history of a brain tumor with necrosis and is currently on oral chemotherapy, and a history of seizures and is currently taking Keppra, but denies any seizure-like activity. He also has a history of lung cancer, which was treated with radiation. His last treatment was in April. He reports no symptoms, and is following with an oncologist.     REVIEW OF SYSTEMS  Pertinent positives include left oral numbness and left hand numbness. Pertinent negatives include no chest pain or fever. As above, all other systems reviewed and are negative.   See HPI for further details.     PAST MEDICAL HISTORY   has a past medical history of Cancer (HCC).    SURGICAL HISTORY   has a past surgical history that includes other orthopedic surgery and craniotomy [facility] (Right, 11/30/2018).    SOCIAL HISTORY  Social History     Tobacco Use   • Smoking status: Never Smoker   •  "Smokeless tobacco: Never Used   Substance Use Topics   • Alcohol use: Not Currently     Comment: rare   • Drug use: No      Social History     Substance and Sexual Activity   Drug Use No       FAMILY HISTORY  Family History   Problem Relation Age of Onset   • Heart Disease Mother    • Rheumatologic Disease Mother         aortitis   • Rheumatologic Disease Father    • Diabetes Maternal Grandfather    • Cancer Maternal Grandfather    • Stroke Maternal Grandmother        CURRENT MEDICATIONS  Home Medications     Reviewed by Lui Llanes (Pharmacy Tech) on 07/14/20 at 1808  Med List Status: Partial   Medication Last Dose Status   Alectinib HCl (ALECENSA) 150 MG Cap  Active   levETIRAcetam (KEPPRA) 500 MG Tab  Active                ALLERGIES  No Known Allergies    PHYSICAL EXAM  VITAL SIGNS: /79   Pulse 79   Temp 36.5 °C (97.7 °F) (Temporal)   Resp 16   Ht 1.803 m (5' 11\")   Wt 81.5 kg (179 lb 10.8 oz)   SpO2 96%   BMI 25.06 kg/m²     Constitutional: Well developed, Well nourished, mild distress, Non-toxic appearance.   HENT: Normocephalic, Atraumatic, Bilateral external ears normal, Oropharynx moist, No oral exudates.   Eyes: PERRLA, EOMI, Conjunctiva normal, No discharge.   Neck: No tenderness, Supple, No stridor.   Lymphatic: No lymphadenopathy noted.   Cardiovascular: Normal heart rate, Normal rhythm.   Thorax & Lungs: Clear to auscultation bilaterally, No respiratory distress, No wheezing, No crackles.   Abdomen: Soft, No tenderness, No masses, No pulsatile masses.   Skin: Warm, Dry, No erythema, No rash.   Extremities:, No edema, No cyanosis.   Musculoskeletal: Generalized weakness on left side of his body, 3/5 on left arm, and worst in the hand. No tenderness to palpation or major deformities noted.  Intact distal pulses  Neurologic: Awake, alert. Moves all extremities spontaneously. Normal sensation throughout body.   Psychiatric: Affect normal, Judgment normal, Mood normal.     LABS  Results " for orders placed or performed during the hospital encounter of 07/14/20   CBC WITH DIFFERENTIAL   Result Value Ref Range    WBC 4.6 (L) 4.8 - 10.8 K/uL    RBC 5.01 4.70 - 6.10 M/uL    Hemoglobin 14.4 14.0 - 18.0 g/dL    Hematocrit 42.9 42.0 - 52.0 %    MCV 85.6 81.4 - 97.8 fL    MCH 28.7 27.0 - 33.0 pg    MCHC 33.6 (L) 33.7 - 35.3 g/dL    RDW 38.6 35.9 - 50.0 fL    Platelet Count 239 164 - 446 K/uL    MPV 8.9 (L) 9.0 - 12.9 fL    Neutrophils-Polys 50.00 44.00 - 72.00 %    Lymphocytes 39.30 22.00 - 41.00 %    Monocytes 6.60 0.00 - 13.40 %    Eosinophils 3.30 0.00 - 6.90 %    Basophils 0.40 0.00 - 1.80 %    Immature Granulocytes 0.40 0.00 - 0.90 %    Nucleated RBC 0.00 /100 WBC    Neutrophils (Absolute) 2.28 1.82 - 7.42 K/uL    Lymphs (Absolute) 1.79 1.00 - 4.80 K/uL    Monos (Absolute) 0.30 0.00 - 0.85 K/uL    Eos (Absolute) 0.15 0.00 - 0.51 K/uL    Baso (Absolute) 0.02 0.00 - 0.12 K/uL    Immature Granulocytes (abs) 0.02 0.00 - 0.11 K/uL    NRBC (Absolute) 0.00 K/uL   COMP METABOLIC PANEL   Result Value Ref Range    Sodium 139 135 - 145 mmol/L    Potassium 3.4 (L) 3.6 - 5.5 mmol/L    Chloride 101 96 - 112 mmol/L    Co2 26 20 - 33 mmol/L    Anion Gap 12.0 7.0 - 16.0    Glucose 93 65 - 99 mg/dL    Bun 15 8 - 22 mg/dL    Creatinine 0.92 0.50 - 1.40 mg/dL    Calcium 9.2 8.5 - 10.5 mg/dL    AST(SGOT) 22 12 - 45 U/L    ALT(SGPT) 18 2 - 50 U/L    Alkaline Phosphatase 91 30 - 99 U/L    Total Bilirubin 0.5 0.1 - 1.5 mg/dL    Albumin 4.3 3.2 - 4.9 g/dL    Total Protein 7.6 6.0 - 8.2 g/dL    Globulin 3.3 1.9 - 3.5 g/dL    A-G Ratio 1.3 g/dL   ESTIMATED GFR   Result Value Ref Range    GFR If African American >60 >60 mL/min/1.73 m 2    GFR If Non African American >60 >60 mL/min/1.73 m 2     All labs reviewed by me.    RADIOLOGY  MR-BRAIN-W/O    (Results Pending)     The radiologist's interpretation of all radiological studies have been reviewed by me.    COURSE & MEDICAL DECISION MAKING  Pertinent Labs & Imaging studies  reviewed. (See chart for details)    2:48 PM - Patient seen and examined at bedside. Ordered MR-Brain, CBC with diff, CMP, and estimated GFR to evaluate his symptoms. The differential diagnoses include but are not limited to: radiation necrosis vs expansion of brain metastasis vs CVA vs medication induce hemorrhage    6:55 PM - Patient was reevaluated at bedside. Patient is still not experiencing any symptoms. I informed the patient of his imaging results, which shows a small lesion in his brain. Mother notes that he had a brain scan last month which did not show any abnormalities.     6:59 PM - Paged Oncology    7:06 PM - Oncology responded. I discussed the patient's case and the above findings with Dr. Richards (Oncology) who agrees to evaluate the patient.     7:12 PM - Patient was reevaluated at bedside. Discussed radiology results with the patient and informed them of my consultation with Oncology and my plan for discharge. I discussed my plan for at home care with the patient, which includes to follow up with oncology and to return back to the ED for any new or worsening symptoms. Patient understands and verbalizes agreement to plan of care. Patient is comfortable going home at this time.       PPE Note: I personally donned full PPE for all patient encounters during this visit, including being clean-shaven with an N95 respirator mask and gloves.     Scribe remained outside the patient's room and did not have any contact with the patient for the duration of patient encounter.       Decision Making:  This is a patient with past history of metastatic disease of the brain lesion treated with radiation and oral chemotherapy he had a transient episode of focal neurologic deficit with numbness face and hand but this rapidly resolved the MRI is a little concerning compared to last MRI here showing possibly a small lesion or bleed however with no symptoms at this point I think will just need to be followed will have her  follow-up with her oncologist that he was following here until recently and we did discuss this with the oncologist on-call        The patient will return for new or worsening symptoms and is stable at the time of discharge.    The patient is referred to a primary physician for blood pressure management, diabetic screening, and for all other preventative health concerns.    DISPOSITION:  Patient will be discharged home in stable condition.    FOLLOW UP:  Dominick Castorena M.D.  [address] [place] [id number] [phone]            FINAL IMPRESSION  1. Focal neurological deficit    2. Paresthesias          Hollis LARIOS (Scribe), am scribing for, and in the presence of, Dom Bertrand M.D..    Electronically signed by: Hollis Lucas (Scribe), 7/14/2020    Dom LARIOS M.D. personally performed the services described in this documentation, as scribed by Hollis Lucas in my presence, and it is both accurate and complete.    C    The note accurately reflects work and decisions made by me.  Dom Bertrand M.D.  7/14/2020  10:18 PM

## 2020-07-14 NOTE — ED NOTES
Pt to blue 17.  Connected to cardiac monitor, BP cuff, and continuous pulse ox upon arrival.  Pt in gown, call light in reach, bed in lowest position.  Chart up for ERP.

## 2020-07-14 NOTE — ED NOTES
Patient resting on cart, awake, alert, oriented x 4. Patient updated on POC, verbalizes understanding. Assessment unchanged. Denies needs or questions, call light within reach, will continue to monitor. Patient remains on monitoring. Visitor at bedside, cart in low, locked position, provided with pillow & blanket.

## 2020-07-14 NOTE — ED NOTES
Patient resting on cart, awake, alert, oriented x 4. Patient updated on POC, verbalizes understanding. Assessment unchanged. Denies needs or questions, call light within reach, will continue to monitor. Patient remains on monitoring. Visitor at bedside, pt denies complains currently.

## 2020-07-15 ENCOUNTER — HOSPITAL ENCOUNTER (OUTPATIENT)
Dept: RADIATION ONCOLOGY | Facility: MEDICAL CENTER | Age: 25
End: 2020-07-31
Attending: RADIOLOGY
Payer: COMMERCIAL

## 2020-07-15 PROCEDURE — 99441 PR PHYSICIAN TELEPHONE EVALUATION 5-10 MIN: CPT | Mod: 95 | Performed by: RADIOLOGY

## 2020-07-15 NOTE — PROGRESS NOTES
Telephone Appointment Visit   As a means of avoiding spread of COVID-19, this visit is being conducted by telephone. This telephone visit was initiated by the patient and they verbally consented.    Time at start of call: 1:15    Reason for Call:  Symptom Follow-up    HPI:    Since last seen in January of this year patient has moved to [place] and resume follow-up care there.  He was flying in to visit his family from [place] recently and started developing some of the similar symptoms he had when he initially presented with the brain metastasis at his initial presentation.  He developed numbness in the left face and some numbness in his left hand.  When he was in the emergency room 7/14/2020an MRI scan noncontrast was done. It was felt that the lesion in the right frontal lobe adjacent to the surgical cavity that had been priorly irradiated had increased from the scan from 1/6/2020.  However according to the patient he had an MRI scan in May which showed no change from the scan in January.  We are not able to obtain those images which were done in [place].I reviewed the MRI scan with Dr. Miller.  We feel that there may not be much change in the scan From January however because of the noncontrast it is hard to compare.    Labs / Images Reviewed:   MRI scan done 7/14/2020     Assessment and Plan:     A 24 y.o. 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  Stereotactic radio therapy to the right frontal excised brain metastasis  and left cerebellar metastasis complete 1/10/2019. Now status post  radiation therapy to an additional right frontal lobe lesion complete  4/23/2019. Now followed in [place] but here on vacation. Patient is continuing on Alecensa.Seen in the emergency room yesterday because of some numbness in his face and hand which have since resolved.    Follow-up: Patient is going to repeat his MRI scan when he returns back to [place] and continue his follow-up there.  I told him to just maintain  his hydration and continue on his Keppra.  He is also going to call his neurologist in [place] to see if he needs any adjustments on his Keppra.  I am happy to see him as needed.    Time at end of call: 1:20  Total Time Spent: 5-10 minutes    Allison Davis M.D.

## 2020-07-15 NOTE — ED NOTES
Patient resting on cart, awake, alert, oriented x 4. Patient updated on POC, verbalizes understanding. Assessment unchanged. Denies needs or questions, call light within reach, will continue to monitor. Patient remains on monitoring. Pt updated on approx 15 minutes for transport per MRI, visitor remains at bedside.

## 2020-07-15 NOTE — DISCHARGE INSTRUCTIONS
You have a very small bleed or some other small lesion this will need to be followed.  The oncologist was consulted and they will see you if you call and make an appointment tomorrow.  You can return if there is recurrence of the symptoms

## 2020-07-15 NOTE — ED NOTES
Patient returned from imaging, resting on cart with visitor present at bedside. Call light within reach, will continue to monitor.

## 2020-07-15 NOTE — ED NOTES
Pt given discharge instructions and MRI CD. Medication education provided. PIV removed, dressing applied. Signed copy in chart. Pt states all belongings in possession. Pt ambulatory off unit with steady gait

## 2020-07-20 ENCOUNTER — HOSPITAL ENCOUNTER (OUTPATIENT)
Dept: RADIOLOGY | Facility: MEDICAL CENTER | Age: 25
End: 2020-07-20
Payer: COMMERCIAL
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
## 2021-03-22 NOTE — PROGRESS NOTES
"Subjective:      Keanu Trivedi is a 22 y.o. male who presents with Pharyngitis (x 4 days, also has cough)            HPI  Pt is 22 yr old with 4 days of a cough and sore throat, Just using OTCs without affect,;    No Known Allergies   Social History     Social History   • Marital status: Single     Spouse name: N/A   • Number of children: N/A   • Years of education: N/A     Occupational History   • Not on file.     Social History Main Topics   • Smoking status: Never Smoker   • Smokeless tobacco: Never Used   • Alcohol use Not on file   • Drug use: No   • Sexual activity: Not on file     Other Topics Concern   • Not on file     Social History Narrative   • No narrative on file   History reviewed. No pertinent past medical history.   Current Outpatient Prescriptions on File Prior to Visit   Medication Sig Dispense Refill   • amoxicillin (AMOXIL) 875 MG tablet Take 1 Tab by mouth 2 times a day. 20 Tab 0   • cetirizine (ZYRTEC) 10 MG Tab Take 10 mg by mouth every day.     • erythromycin 5 MG/GM Ointment Place 1 Application in right eye 3 times a day. 1 Tube 0   • tramadol (ULTRAM) 50 MG Tab Take 1-2 Tabs by mouth every 6 hours as needed. 10 Tab 0     No current facility-administered medications on file prior to visit.    No family history on file.  Review of Systems   Constitutional: Positive for fever. Negative for diaphoresis.   HENT: Positive for sore throat. Negative for ear pain.    Eyes: Negative for discharge and redness.   Respiratory: Negative for cough, hemoptysis, sputum production and stridor.    Cardiovascular: Negative for chest pain.   Gastrointestinal: Negative for abdominal pain, diarrhea and vomiting.   Musculoskeletal: Negative for back pain and neck pain.   Skin: Negative for itching and rash.          Objective:     /78   Pulse 67   Temp 36.8 °C (98.2 °F)   Resp 16   Ht 1.803 m (5' 11\")   Wt 78.5 kg (173 lb)   SpO2 97%   BMI 24.13 kg/m²      Physical Exam   Constitutional: He appears " Left message for patients wife to ask what exactly they would need to be seen for and to give us a call back. well-developed and well-nourished. No distress.   HENT:   Head: Normocephalic and atraumatic.   Right Ear: External ear normal.   Left Ear: External ear normal.   Mouth/Throat: Oropharyngeal exudate present.   Eyes: Conjunctivae are normal.   Neck: Normal range of motion. Neck supple.   Cardiovascular: Normal rate and regular rhythm.    Pulmonary/Chest: Effort normal and breath sounds normal.   Abdominal: He exhibits no distension. There is no tenderness. There is no guarding.   Musculoskeletal: Normal range of motion.   Lymphadenopathy:     He has cervical adenopathy.   Neurological: He is alert. Coordination normal.   Skin: Skin is warm. No rash noted. He is not diaphoretic. No erythema.   Psychiatric: He has a normal mood and affect. His behavior is normal.   Nursing note and vitals reviewed.            Rapid strep neg   Throat culture pending  Mononucleosis prn  Assessment/Plan:     1. Sore throat    2. Purulent tonsillitis      I am recommending the patient initiate/ continue hydration efforts including the use of a vaporizer/humidifier/ netti pot. I also recommend the pt, initiate Mucinex (if older than 4) Sudafed or Dayquil if not hypertensive. In addition the patient will initiate the prescribed prescription medication/s: Xylocaine viscours, I will call if culture positive. Given work note. If the patient's condition exacerbates with worsening dysphagia, shortness of breath, uncontrolled fever, headache or chest pressure he/she will return immediately to the urgent care or go to  the emergency department for further evaluation.    Hermelindo Maier    - lidocaine viscous 2% (XYLOCAINE) 2 % Solution; Take 5 mL by mouth 6 Times a Day.  Dispense: 200 mL; Refill: 1  - POCT Rapid Strep A  - CULTURE THROAT; Future  - MONONUCLEOSIS TEST QUAL; Future

## 2021-06-25 NOTE — ED NOTES
Report received from Diann EDGAR. Whiteboard updated. No needs at this time. Call light within reach.      Detail Level: Generalized Size Of Lesion In Cm (Optional): 0

## 2022-02-21 NOTE — PROGRESS NOTES
RADIATION ONCOLOGY FOLLOW-UP    DATE OF SERVICE: 1/17/2020    IDENTIFICATION:   A 24 y.o. 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 Stereotactic radio therapy to the right frontal excised brain metastasis and left cerebellar metastasis complete 1/10/2019.  Now status post radiation therapy to an additional right frontal lobe lesion complete 4/23/2019.  Here for follow-up today about Decadron taper after having a seizure the week of Thanksgiving and continuing the Decadron taper.    HISTORY OF PRESENT ILLNESS:   Since last seen he has been on a slow dexamethasone taper he was seen at [facility] by a neurologist and she felt that he should continue on Keppra for a lifetime.  He recently had a PET CT 1/16/2020 this showed minimal residual opacification and activity noted in the left suprahilar region with continued decrease in size compared to most recent CT.  There were groundglass nodules likely stable by CT and there is no associated elevated activity on PET.  No adenopathy or elevated maria activity no evidence of distant metastatic disease in the neck abdomen or pelvis.  MRI scan of the brain showed stable ill-defined enhancing lesion and surrounding T2 flair hyperintensity in the left cerebellar hemisphere.  There was a mildly increased enhancing lesion in the periventricular white matter deep to the posterior right frontal resection cavity.  Small linear foci of enhancement adjacent to the resection cavity in the lateral right frontal lobe slightly more conspicuous which could be related to differences in technique however continued attention to follow-up.  Currently Keanu is doing well without any major complaints he wants to go down to [place] for a few months to visit his daughter once he gets a clear bill of health.    CURRENT MEDICATIONS:  Current Outpatient Medications   Medication Sig Dispense Refill   • levETIRAcetam (KEPPRA) 500 MG Tab Take 500 mg by mouth.     • dexamethasone (DECADRON) 4 MG  "Tab Take 2 mg by mouth every day.     • Alectinib HCl (ALECENSA) 150 MG Cap Take 600 mg by mouth 2 Times a Day.       No current facility-administered medications for this encounter.        ALLERGIES:  Patient has no known allergies.    FAMILY HISTORY:    Family History   Problem Relation Age of Onset   • Heart Disease Mother    • Rheumatologic Disease Mother         aortitis   • Rheumatologic Disease Father    • Diabetes Maternal Grandfather    • Cancer Maternal Grandfather    • Stroke Maternal Grandmother    [unfilled]        SOCIAL HISTORY:     reports that he has never smoked. He has never used smokeless tobacco. He reports previous alcohol use. He reports that he does not use drugs.        REVIEW OF SYSTEMS: Is significant for nothing  The rest of the review of systems has been reviewed by me and is documented in the nursing note in [facility] dated 1/17/2020    PHYSICAL EXAM:     ECOG PERFORMANCE STATUS:  0= Fully active, able to carry on all pre-disease performance without restriction.       Vitals:    01/17/20 1426   BP: 143/91   BP Location: Right arm   Patient Position: Sitting   BP Cuff Size: Adult   Pulse: 65   Temp: 36.7 °C (98 °F)   TempSrc: Temporal   SpO2: 96%   Weight: 88.3 kg (194 lb 11.2 oz)   Height: 1.778 m (5' 10\")   Pain Score: No pain        GENERAL: Well-appearing alert and oriented x3 in no apparent distress  HEENT:  Pupils are equal, round, and reactive to light.  Extraocular muscles   are intact. Sclerae nonicteric.  Conjunctivae pink.  Oral cavity, tongue   protrudes midline.   NECK: No palpable nodes in neck or supra clavicular fossa  LUNGS:  Clear to ascultation   HEART:  Regular rate and rhythm.  No murmur appreciated  ABDOMEN:  Soft. No evidence of hepatosplenomegaly.  Positive bowel sounds.  EXTREMITIES:  Without Edema.  NEUROLOGIC:  Cranial nerves II through XII were intact. Normal stance and gait motor and sensory grossly within normal limits          IMPRESSION:    A 24 y.o. 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 Stereotactic radio therapy to the right frontal excised brain metastasis and left cerebellar metastasis complete 1/10/2019.  Now status post radiation therapy to an additional right frontal lobe lesion complete 4/23/2019.  Doing well stable disease on MRI scan and PET CT..    RECOMMENDATIONS:   I will see him in follow-up in 3 months or 3-1/2 months with a repeat SRS MRI scan at that time.  He plans to see Dr. Feliz and Dr. Castorena later this month.  He will continue on his alectinib      Thank you for the opportunity to participate in his care.  If any questions or comments, please do not hesitate in calling.      Please note that this dictation was created using voice recognition software. I have made every reasonable attempt to correct obvious errors, but I expect that there are errors of grammar and possibly content that I did not discover before finalizing the note.                 Post-Care Instructions: I reviewed with the patient in detail post-care instructions. Patient is to keep the biopsy site dry overnight, and then apply bacitracin twice daily until healed. Patient may apply hydrogen peroxide soaks to remove any crusting. Depth Of Biopsy: dermis Silver Nitrate Text: The wound bed was treated with silver nitrate after the biopsy was performed. Biopsy Type: H and E Validate Note Data (See Information Below): Yes Render In Bullet Format When Appropriate: No Anesthesia Type: 1% lidocaine with epinephrine Information: Selecting Yes will display possible errors in your note based on the variables you have selected. This validation is only offered as a suggestion for you. PLEASE NOTE THAT THE VALIDATION TEXT WILL BE REMOVED WHEN YOU FINALIZE YOUR NOTE. IF YOU WANT TO FAX A PRELIMINARY NOTE YOU WILL NEED TO TOGGLE THIS TO 'NO' IF YOU DO NOT WANT IT IN YOUR FAXED NOTE. Biopsy Method: Dermablade Cryotherapy Text: The wound bed was treated with cryotherapy after the biopsy was performed. X Size Of Lesion In Cm: 0 Electrodesiccation And Curettage Text: The wound bed was treated with electrodesiccation and curettage after the biopsy was performed. Detail Level: Detailed Curettage Text: The wound bed was treated with curettage after the biopsy was performed. Dressing: bandage Type Of Destruction Used: Curettage Wound Care: Polysporin ointment Electrodesiccation Text: The wound bed was treated with electrodesiccation after the biopsy was performed. Anesthesia Volume In Cc (Will Not Render If 0): 1 Billing Type: Third-Party Bill Consent: Written consent was obtained and risks were reviewed including but not limited to scarring, infection, bleeding, scabbing, incomplete removal, nerve damage and allergy to anesthesia. Hemostasis: Electrocautery Notification Instructions: Patient will be notified of biopsy results. However, patient instructed to call the office if not contacted within 2 weeks.

## 2024-03-19 SDOH — ECONOMIC STABILITY: TRANSPORTATION INSECURITY
IN THE PAST 12 MONTHS, HAS LACK OF RELIABLE TRANSPORTATION KEPT YOU FROM MEDICAL APPOINTMENTS, MEETINGS, WORK OR FROM GETTING THINGS NEEDED FOR DAILY LIVING?: NO

## 2024-03-19 SDOH — ECONOMIC STABILITY: HOUSING INSECURITY: IN THE LAST 12 MONTHS, HOW MANY PLACES HAVE YOU LIVED?: 1

## 2024-03-19 SDOH — HEALTH STABILITY: MENTAL HEALTH
STRESS IS WHEN SOMEONE FEELS TENSE, NERVOUS, ANXIOUS, OR CAN'T SLEEP AT NIGHT BECAUSE THEIR MIND IS TROUBLED. HOW STRESSED ARE YOU?: NOT AT ALL

## 2024-03-19 SDOH — HEALTH STABILITY: PHYSICAL HEALTH: ON AVERAGE, HOW MANY DAYS PER WEEK DO YOU ENGAGE IN MODERATE TO STRENUOUS EXERCISE (LIKE A BRISK WALK)?: 2 DAYS

## 2024-03-19 SDOH — HEALTH STABILITY: PHYSICAL HEALTH: ON AVERAGE, HOW MANY MINUTES DO YOU ENGAGE IN EXERCISE AT THIS LEVEL?: 20 MIN

## 2024-03-19 SDOH — ECONOMIC STABILITY: HOUSING INSECURITY
IN THE LAST 12 MONTHS, WAS THERE A TIME WHEN YOU DID NOT HAVE A STEADY PLACE TO SLEEP OR SLEPT IN A SHELTER (INCLUDING NOW)?: NO

## 2024-03-19 SDOH — ECONOMIC STABILITY: INCOME INSECURITY: HOW HARD IS IT FOR YOU TO PAY FOR THE VERY BASICS LIKE FOOD, HOUSING, MEDICAL CARE, AND HEATING?: SOMEWHAT HARD

## 2024-03-19 SDOH — ECONOMIC STABILITY: INCOME INSECURITY: IN THE LAST 12 MONTHS, WAS THERE A TIME WHEN YOU WERE NOT ABLE TO PAY THE MORTGAGE OR RENT ON TIME?: NO

## 2024-03-19 SDOH — ECONOMIC STABILITY: FOOD INSECURITY: WITHIN THE PAST 12 MONTHS, YOU WORRIED THAT YOUR FOOD WOULD RUN OUT BEFORE YOU GOT MONEY TO BUY MORE.: NEVER TRUE

## 2024-03-19 SDOH — ECONOMIC STABILITY: FOOD INSECURITY: WITHIN THE PAST 12 MONTHS, THE FOOD YOU BOUGHT JUST DIDN'T LAST AND YOU DIDN'T HAVE MONEY TO GET MORE.: NEVER TRUE

## 2024-03-19 SDOH — ECONOMIC STABILITY: TRANSPORTATION INSECURITY
IN THE PAST 12 MONTHS, HAS THE LACK OF TRANSPORTATION KEPT YOU FROM MEDICAL APPOINTMENTS OR FROM GETTING MEDICATIONS?: NO

## 2024-03-19 SDOH — ECONOMIC STABILITY: TRANSPORTATION INSECURITY
IN THE PAST 12 MONTHS, HAS LACK OF TRANSPORTATION KEPT YOU FROM MEETINGS, WORK, OR FROM GETTING THINGS NEEDED FOR DAILY LIVING?: NO

## 2024-03-19 ASSESSMENT — SOCIAL DETERMINANTS OF HEALTH (SDOH)
HOW MANY DRINKS CONTAINING ALCOHOL DO YOU HAVE ON A TYPICAL DAY WHEN YOU ARE DRINKING: 1 OR 2
WITHIN THE PAST 12 MONTHS, YOU WORRIED THAT YOUR FOOD WOULD RUN OUT BEFORE YOU GOT THE MONEY TO BUY MORE: NEVER TRUE
HOW OFTEN DO YOU ATTEND CHURCH OR RELIGIOUS SERVICES?: MORE THAN 4 TIMES PER YEAR
HOW OFTEN DO YOU ATTENT MEETINGS OF THE CLUB OR ORGANIZATION YOU BELONG TO?: NEVER
HOW OFTEN DO YOU GET TOGETHER WITH FRIENDS OR RELATIVES?: MORE THAN THREE TIMES A WEEK
HOW OFTEN DO YOU HAVE SIX OR MORE DRINKS ON ONE OCCASION: NEVER
HOW OFTEN DO YOU ATTENT MEETINGS OF THE CLUB OR ORGANIZATION YOU BELONG TO?: NEVER
HOW OFTEN DO YOU ATTEND CHURCH OR RELIGIOUS SERVICES?: MORE THAN 4 TIMES PER YEAR
IN A TYPICAL WEEK, HOW MANY TIMES DO YOU TALK ON THE PHONE WITH FAMILY, FRIENDS, OR NEIGHBORS?: MORE THAN THREE TIMES A WEEK
HOW HARD IS IT FOR YOU TO PAY FOR THE VERY BASICS LIKE FOOD, HOUSING, MEDICAL CARE, AND HEATING?: SOMEWHAT HARD
HOW OFTEN DO YOU GET TOGETHER WITH FRIENDS OR RELATIVES?: MORE THAN THREE TIMES A WEEK
DO YOU BELONG TO ANY CLUBS OR ORGANIZATIONS SUCH AS CHURCH GROUPS UNIONS, FRATERNAL OR ATHLETIC GROUPS, OR SCHOOL GROUPS?: NO
HOW OFTEN DO YOU HAVE A DRINK CONTAINING ALCOHOL: MONTHLY OR LESS
DO YOU BELONG TO ANY CLUBS OR ORGANIZATIONS SUCH AS CHURCH GROUPS UNIONS, FRATERNAL OR ATHLETIC GROUPS, OR SCHOOL GROUPS?: NO
IN A TYPICAL WEEK, HOW MANY TIMES DO YOU TALK ON THE PHONE WITH FAMILY, FRIENDS, OR NEIGHBORS?: MORE THAN THREE TIMES A WEEK

## 2024-03-19 ASSESSMENT — LIFESTYLE VARIABLES
HOW OFTEN DO YOU HAVE SIX OR MORE DRINKS ON ONE OCCASION: NEVER
HOW OFTEN DO YOU HAVE A DRINK CONTAINING ALCOHOL: MONTHLY OR LESS
SKIP TO QUESTIONS 9-10: 1
AUDIT-C TOTAL SCORE: 1
HOW MANY STANDARD DRINKS CONTAINING ALCOHOL DO YOU HAVE ON A TYPICAL DAY: 1 OR 2

## 2024-03-20 ENCOUNTER — HOSPITAL ENCOUNTER (OUTPATIENT)
Dept: RADIOLOGY | Facility: MEDICAL CENTER | Age: 29
End: 2024-03-20
Attending: INTERNAL MEDICINE
Payer: COMMERCIAL

## 2024-03-20 DIAGNOSIS — C34.90 MALIGNANT NEOPLASM OF BRONCHUS AND LUNG (HCC): ICD-10-CM

## 2024-03-20 PROCEDURE — 700117 HCHG RX CONTRAST REV CODE 255: Mod: UD

## 2024-03-20 PROCEDURE — 71260 CT THORAX DX C+: CPT

## 2024-03-20 RX ORDER — LEVETIRACETAM 750 MG/1
1500 TABLET ORAL 2 TIMES DAILY
COMMUNITY
Start: 2020-11-19 | End: 2024-05-22

## 2024-03-20 RX ADMIN — IOHEXOL 50 ML: 240 INJECTION, SOLUTION INTRATHECAL; INTRAVASCULAR; INTRAVENOUS; ORAL at 14:49

## 2024-03-20 RX ADMIN — IOHEXOL 100 ML: 350 INJECTION, SOLUTION INTRAVENOUS at 14:49

## 2024-03-21 ENCOUNTER — HOSPITAL ENCOUNTER (OUTPATIENT)
Dept: RADIOLOGY | Facility: MEDICAL CENTER | Age: 29
End: 2024-03-21

## 2024-03-21 ENCOUNTER — OFFICE VISIT (OUTPATIENT)
Dept: MEDICAL GROUP | Facility: MEDICAL CENTER | Age: 29
End: 2024-03-21
Payer: COMMERCIAL

## 2024-03-21 VITALS
WEIGHT: 201.94 LBS | BODY MASS INDEX: 28.27 KG/M2 | SYSTOLIC BLOOD PRESSURE: 122 MMHG | HEART RATE: 64 BPM | OXYGEN SATURATION: 96 % | DIASTOLIC BLOOD PRESSURE: 76 MMHG | TEMPERATURE: 97.3 F | HEIGHT: 71 IN

## 2024-03-21 DIAGNOSIS — G81.94 LEFT HEMIPARESIS (HCC): ICD-10-CM

## 2024-03-21 DIAGNOSIS — C79.31 MALIGNANT NEOPLASM METASTATIC TO BRAIN (HCC): ICD-10-CM

## 2024-03-21 DIAGNOSIS — C34.92 BRONCHOGENIC CANCER OF LEFT LUNG (HCC): ICD-10-CM

## 2024-03-21 DIAGNOSIS — Z76.89 ENCOUNTER TO ESTABLISH CARE WITH NEW DOCTOR: ICD-10-CM

## 2024-03-21 PROBLEM — C34.90 NON-SMALL CELL LUNG CANCER (NSCLC) (HCC): Status: ACTIVE | Noted: 2019-04-04

## 2024-03-21 PROCEDURE — 3074F SYST BP LT 130 MM HG: CPT | Performed by: INTERNAL MEDICINE

## 2024-03-21 PROCEDURE — 99204 OFFICE O/P NEW MOD 45 MIN: CPT | Performed by: INTERNAL MEDICINE

## 2024-03-21 PROCEDURE — 3078F DIAST BP <80 MM HG: CPT | Performed by: INTERNAL MEDICINE

## 2024-03-21 ASSESSMENT — ENCOUNTER SYMPTOMS
HEADACHES: 0
SEIZURES: 0
SENSORY CHANGE: 0
FOCAL WEAKNESS: 1
SPEECH CHANGE: 0

## 2024-03-21 ASSESSMENT — PATIENT HEALTH QUESTIONNAIRE - PHQ9: CLINICAL INTERPRETATION OF PHQ2 SCORE: 0

## 2024-03-21 NOTE — ASSESSMENT & PLAN NOTE
Status post radiation and craniotomy.  He is on alectinib 600 mg twice daily.    Refer to physical therapy

## 2024-03-21 NOTE — PROGRESS NOTES
Subjective:     Chief Complaint   Patient presents with    Establish Care     [facility] oncology       Diagnoses of Bronchogenic cancer of left lung (HCC), Malignant neoplasm metastatic to brain (HCC), Left hemiparesis (HCC), and Encounter to establish care with new doctor were pertinent to this visit.    HISTORY OF THE PRESENT ILLNESS: Patient is a 28 y.o. male. This pleasant patient is here today to establish care and discuss the following:     Problem   Malignant Neoplasm Metastatic to Brain (Hcc)    Currently on Keppra 1500 mg prophylaxis and alectinib 600 mg twice daily.    He has significant left-sided hemiparesis.  He does not do physical therapy right now, however he is interested in referral.  He is also awaiting appointment with neurologist at [facility].       Bronchogenic Cancer of Left Lung (Hcc)    28 year old never smoking man with recent diagnosis of Stage IV NSCLC-adenocarcinoma metastatic to brain, ALK positive by FISH, on alectinib c/b radiation necrosis ? Patricia.    ONCOLOGIC HISTORY:  The patient noted several months of progressive headaches  In November he also noted L mouth and arm numbness with mild left arm weakness.    11/15/18 - MRI brain - R frontal lobe lesion 3.1 x 1.8 cm, smaller mass left cerebellum 1.1 cm    11/16/18 - CT C/A/P - 7.2 cm x 5.3 cm tumor crossing left upper and lower lobs. 5 mm right upper lobe nodule    11/20/2018 - core biopsy left lung mass. Lung adenocarcinoma - (CK7+/CK20-/TTF1+)    PD-L1 (22C3) - 60%    [employer] Positive for ALK rearrangement [employer] ALK break apart FISH (23/100). EGFR mut and ROS1 FISH negative    [facility] Testing - ALK inversion noted in variant of undetermined significance but no EML4-ALK rearrangement detected.    11/29/18 - PET CT - FDG avid L suprahilar mass with extension to left hilum. FDG avid brain mass. Non specific activity in L palantine tonsil and small L cervical LN    12/3/18 - Right frontal craniotomy and Rresection of  right frontal brain tumor - metastatic adenocarcinoma    12/20/18 - Started alectinib    Received stereotactic RT to brain mets c/b radiation necrosis s/p avastin with residual L sided weakness.       Non-Small Cell Lung Cancer ([facility]clc) (Hcc)    Formatting of this note might be different from the original.   ALK positive by FISH     Encounter to Establish Care With New Doctor    He presents today to establish care he has no acute issues or concerns.       Past Medical History:   Diagnosis Date    Cancer (HCC)     non-small cell lung     Past Surgical History:   Procedure Laterality Date    CRANIOTOMY STEALTH Right 11/30/2018    Procedure: CRANIOTOMY STEALTH- FRONTAL BRAIN TUMOR RESECTION;  Surgeon: Stephen Manzano III, M.D.;  Location: SURGERY [facility];  Service: Neurosurgery    CRANIOTOMY      OTHER ORTHOPEDIC SURGERY      foot repair     Family History   Problem Relation Age of Onset    Heart Disease Mother     Rheumatologic Disease Mother         aortitis    Cancer Mother     Rheumatologic Disease Father     Stroke Maternal Grandmother     Dementia Maternal Grandmother     Diabetes Maternal Grandfather     Cancer Maternal Grandfather     Lung Disease Maternal Grandfather      Social History     Tobacco Use    Smoking status: Never    Smokeless tobacco: Never   Vaping Use    Vaping Use: Never used   Substance Use Topics    Alcohol use: Not Currently     Comment: rare    Drug use: No     Current Outpatient Medications Ordered in Epic   Medication Sig Dispense Refill    levetiracetam (KEPPRA) 750 MG tablet Take 1,500 mg by mouth 2 times a day.      Alectinib HCl (ALECENSA) 150 MG Cap Take 600 mg by mouth 2 Times a Day.       No current [facility]-ordered facility-administered medications on file.     Review of Systems   Neurological:  Positive for focal weakness. Negative for sensory change, speech change, seizures and headaches.   All other systems reviewed and are negative.    Objective:     Exam: /76 (BP  "Location: Left arm, Patient Position: Sitting, BP Cuff Size: Adult)   Pulse 64   Temp 36.3 °C (97.3 °F) (Temporal)   Ht 1.803 m (5' 11\")   Wt 91.6 kg (201 lb 15.1 oz)   SpO2 96%  Body mass index is 28.17 kg/m².    Physical Exam  Constitutional:       General: He is not in acute distress.     Appearance: Normal appearance. He is not toxic-appearing.   Eyes:      General: No visual field deficit.  Cardiovascular:      Rate and Rhythm: Normal rate and regular rhythm.      Pulses: Normal pulses.      Heart sounds: Normal heart sounds. No murmur heard.  Pulmonary:      Effort: Pulmonary effort is normal. No respiratory distress.      Breath sounds: Normal breath sounds.   Neurological:      Mental Status: He is alert and oriented to person, place, and time.      Cranial Nerves: Cranial nerves 2-12 are intact. No cranial nerve deficit or facial asymmetry.      Sensory: Sensation is intact.      Motor: Weakness (Left-sided hemiparesis) and abnormal muscle tone present.      Coordination: Coordination normal.       Labs: Reviewed CBC, CMP, CPK from 2/7/2024    Assessment & Plan:   28 y.o. male with the following -    Problem List Items Addressed This Visit       Malignant neoplasm metastatic to brain (HCC) (Chronic)     Status post radiation and craniotomy.  He is on alectinib 600 mg twice daily.    Refer to physical therapy         Relevant Orders    Referral to Physical Therapy    Bronchogenic cancer of left lung (HCC)     Please refer to oncology notes for more details.         Encounter to establish care with new doctor     Other Visit Diagnoses       Left hemiparesis (HCC)        Relevant Orders    Referral to Physical Therapy          Return if symptoms worsen or fail to improve.    Please note that this dictation was created using voice recognition software. I have made every reasonable attempt to correct obvious errors, but I expect that there are errors of grammar and possibly content that I did not discover " before finalizing the note.

## 2024-03-21 NOTE — LETTER
[facility]  Gwendolyn Cedeño M.D.  [address]  [place] [address]  Fax: [phone]   Authorization for Release/Disclosure of   Protected Health Information   Name: KEANU TRIVEDI : 1995 SSN: [id number]   Address: [address] [place] [address] [place] [address] Phone:    [phone] (home)    I authorize the entity listed below to release/disclose the PHI below to:   [facility]/Gwendolyn Cedeño M.D. and Gwendolyn Cedeño M.D.   Provider or Entity Name:     Address   City, State, Zip   Phone:      Fax:     Reason for request: continuity of care   Information to be released:    [  ] LAST COLONOSCOPY,  including any PATH REPORT and follow-up  [  ] LAST FIT/COLOGUARD RESULT [  ] LAST DEXA  [  ] LAST MAMMOGRAM  [  ] LAST PAP  [  ] LAST LABS [  ] RETINA EXAM REPORT  [  ] IMMUNIZATION RECORDS  [  ] Release all info      [  ] Check here and initial the line next to each item to release ALL health information INCLUDING  _____ Care and treatment for drug and / or alcohol abuse  _____ HIV testing, infection status, or AIDS  _____ Genetic Testing    DATES OF SERVICE OR TIME PERIOD TO BE DISCLOSED: _____________  I understand and acknowledge that:  * This Authorization may be revoked at any time by you in writing, except if your health information has already been used or disclosed.  * Your health information that will be used or disclosed as a result of you signing this authorization could be re-disclosed by the recipient. If this occurs, your re-disclosed health information may no longer be protected by State or Federal laws.  * You may refuse to sign this Authorization. Your refusal will not affect your ability to obtain treatment.  * This Authorization becomes effective upon signing and will  on (date) __________.      If no date is indicated, this Authorization will  one (1) year from the signature date.    Name: Keanu Trivedi  Signature: Date:    3/21/2024     PLEASE FAX REQUESTED RECORDS BACK TO: [phone]

## 2024-03-26 ENCOUNTER — HOSPITAL ENCOUNTER (OUTPATIENT)
Dept: RADIOLOGY | Facility: MEDICAL CENTER | Age: 29
End: 2024-03-26

## 2024-04-19 ENCOUNTER — APPOINTMENT (OUTPATIENT)
Dept: RADIOLOGY | Facility: MEDICAL CENTER | Age: 29
End: 2024-04-19
Attending: INTERNAL MEDICINE
Payer: COMMERCIAL

## 2024-04-19 DIAGNOSIS — C34.90 MALIGNANT NEOPLASM OF BRONCHUS AND LUNG (HCC): ICD-10-CM

## 2024-04-19 PROCEDURE — 70553 MRI BRAIN STEM W/O & W/DYE: CPT

## 2024-04-19 PROCEDURE — 700117 HCHG RX CONTRAST REV CODE 255: Mod: JZ

## 2024-04-19 PROCEDURE — A9579 GAD-BASE MR CONTRAST NOS,1ML: HCPCS | Mod: JZ

## 2024-04-19 RX ADMIN — GADOTERIDOL 20 ML: 279.3 INJECTION, SOLUTION INTRAVENOUS at 15:50

## 2024-06-21 ENCOUNTER — HOSPITAL ENCOUNTER (OUTPATIENT)
Facility: MEDICAL CENTER | Age: 29
End: 2024-06-21
Attending: ORTHOPAEDIC SURGERY | Admitting: ORTHOPAEDIC SURGERY
Payer: MEDICARE

## 2024-06-21 VITALS — BODY MASS INDEX: 27.3 KG/M2 | HEIGHT: 71 IN | WEIGHT: 195 LBS

## 2024-06-21 DIAGNOSIS — M87.052 AVASCULAR NECROSIS OF HIP, LEFT (HCC): ICD-10-CM

## 2024-06-21 DIAGNOSIS — G89.18 POST-OPERATIVE PAIN: ICD-10-CM

## 2024-06-21 PROBLEM — M25.552 LEFT HIP PAIN: Status: ACTIVE | Noted: 2024-06-21

## 2024-06-21 RX ORDER — LEVETIRACETAM 750 MG/1
750 TABLET ORAL 2 TIMES DAILY
COMMUNITY

## 2024-07-24 ENCOUNTER — TELEPHONE (OUTPATIENT)
Dept: MEDICAL GROUP | Facility: MEDICAL CENTER | Age: 29
End: 2024-07-24
Payer: COMMERCIAL

## 2024-07-31 ENCOUNTER — APPOINTMENT (RX ONLY)
Dept: URBAN - METROPOLITAN AREA CLINIC 4 | Facility: CLINIC | Age: 29
Setting detail: DERMATOLOGY
End: 2024-07-31

## 2024-07-31 DIAGNOSIS — B07.8 OTHER VIRAL WARTS: ICD-10-CM

## 2024-07-31 PROBLEM — D48.5 NEOPLASM OF UNCERTAIN BEHAVIOR OF SKIN: Status: ACTIVE | Noted: 2024-07-31

## 2024-07-31 PROCEDURE — ? COUNSELING

## 2024-07-31 PROCEDURE — 11102 TANGNTL BX SKIN SINGLE LES: CPT

## 2024-07-31 PROCEDURE — ? BIOPSY BY SHAVE METHOD

## 2024-07-31 ASSESSMENT — LOCATION DETAILED DESCRIPTION DERM: LOCATION DETAILED: 3RD WEB SPACE RIGHT HAND

## 2024-07-31 ASSESSMENT — LOCATION SIMPLE DESCRIPTION DERM: LOCATION SIMPLE: RIGHT HAND

## 2024-07-31 ASSESSMENT — LOCATION ZONE DERM: LOCATION ZONE: HAND

## 2024-07-31 NOTE — PROCEDURE: BIOPSY BY SHAVE METHOD
Detail Level: Detailed
Depth Of Biopsy: dermis
Was A Bandage Applied: Yes
Size Of Lesion In Cm: 0
Biopsy Type: H and E
Biopsy Method: Dermablade
Anesthesia Type: 1% lidocaine without epinephrine
Anesthesia Volume In Cc: 0.5
Hemostasis: Drysol
Wound Care: Petrolatum
Dressing: bandage
Destruction After The Procedure: No
Type Of Destruction Used: Curettage
Curettage Text: The wound bed was treated with curettage after the biopsy was performed.
Cryotherapy Text: The wound bed was treated with cryotherapy after the biopsy was performed.
Electrodesiccation Text: The wound bed was treated with electrodesiccation after the biopsy was performed.
Electrodesiccation And Curettage Text: The wound bed was treated with electrodesiccation and curettage after the biopsy was performed.
Silver Nitrate Text: The wound bed was treated with silver nitrate after the biopsy was performed.
Lab: [id number]
Lab Facility: 
Path Notes (To The Dermatopathologist): Multiple fragments in specimen bottle
Consent: Written consent was obtained and risks were reviewed including but not limited to scarring, infection, bleeding, scabbing, incomplete removal, nerve damage and allergy to anesthesia.
Post-Care Instructions: I reviewed with the patient in detail post-care instructions. Patient is to keep the biopsy site dry overnight, and then apply bacitracin twice daily until healed. Patient may apply hydrogen peroxide soaks to remove any crusting.
Notification Instructions: Patient will be notified of biopsy results. However, patient instructed to call the office if not contacted within 2 weeks.
Billing Type: Third-Party Bill
Information: Selecting Yes will display possible errors in your note based on the variables you have selected. This validation is only offered as a suggestion for you. PLEASE NOTE THAT THE VALIDATION TEXT WILL BE REMOVED WHEN YOU FINALIZE YOUR NOTE. IF YOU WANT TO FAX A PRELIMINARY NOTE YOU WILL NEED TO TOGGLE THIS TO 'NO' IF YOU DO NOT WANT IT IN YOUR FAXED NOTE.

## 2024-08-12 ENCOUNTER — OFFICE VISIT (OUTPATIENT)
Dept: MEDICAL GROUP | Facility: MEDICAL CENTER | Age: 29
End: 2024-08-12
Payer: MEDICARE

## 2024-08-12 VITALS
BODY MASS INDEX: 28.5 KG/M2 | OXYGEN SATURATION: 94 % | DIASTOLIC BLOOD PRESSURE: 70 MMHG | WEIGHT: 203.6 LBS | SYSTOLIC BLOOD PRESSURE: 96 MMHG | HEART RATE: 70 BPM | HEIGHT: 71 IN

## 2024-08-12 DIAGNOSIS — Z91.89 AT HIGH RISK FOR OSTEOPOROSIS: ICD-10-CM

## 2024-08-12 DIAGNOSIS — Z01.810 PREOPERATIVE CARDIOVASCULAR EXAMINATION: ICD-10-CM

## 2024-08-12 DIAGNOSIS — Z79.899 HIGH RISK MEDICATION USE: ICD-10-CM

## 2024-08-12 DIAGNOSIS — M87.052 AVASCULAR NECROSIS OF BONE OF LEFT HIP (HCC): ICD-10-CM

## 2024-08-12 DIAGNOSIS — C79.31 MALIGNANT NEOPLASM METASTATIC TO BRAIN (HCC): Chronic | ICD-10-CM

## 2024-08-12 DIAGNOSIS — M81.6 LOCALIZED OSTEOPOROSIS (LEQUESNE): ICD-10-CM

## 2024-08-12 PROCEDURE — 99214 OFFICE O/P EST MOD 30 MIN: CPT | Performed by: INTERNAL MEDICINE

## 2024-08-12 PROCEDURE — 3074F SYST BP LT 130 MM HG: CPT | Performed by: INTERNAL MEDICINE

## 2024-08-12 PROCEDURE — 93000 ELECTROCARDIOGRAM COMPLETE: CPT | Performed by: INTERNAL MEDICINE

## 2024-08-12 PROCEDURE — 3078F DIAST BP <80 MM HG: CPT | Performed by: INTERNAL MEDICINE

## 2024-08-12 ASSESSMENT — ENCOUNTER SYMPTOMS
FOCAL WEAKNESS: 1
HEADACHES: 0
SEIZURES: 0
SPEECH CHANGE: 0
SENSORY CHANGE: 0

## 2024-08-12 NOTE — PROGRESS NOTES
"CC:   Chief Complaint   Patient presents with    Pre-op Exam     Diagnoses of Preoperative cardiovascular examination, Avascular necrosis of bone of left hip (HCC), At high risk for osteoporosis, High risk medication use, Localized osteoporosis ([place]), and Malignant neoplasm metastatic to brain (HCC) were pertinent to this visit.  Verbal consent was acquired by the patient to use [employer] ambient listening note generation during this visit Yes   History of Present Illness  Keanu is a 29-year-old male with a history of seizures, avascular necrosis, presenting for preoperative evaluation.    He is scheduled for a hip decompression procedure with Dr. Velasquez. His orthopedic specialist has suggested discussing the possibility of bisphosphonate therapy. He denies experiencing shortness of breath or chest pain. His hip is not causing him significant discomfort.    Past Medical History:   Diagnosis Date    Cancer (HCC)     non-small cell lung     Current Outpatient Medications Ordered in Epic   Medication Sig Dispense Refill    levetiracetam (KEPPRA) 750 MG tablet Take 750 mg by mouth 2 times a day.      Alectinib HCl (ALECENSA) 150 MG Cap Take 600 mg by mouth 2 Times a Day.       No current [facility]-ordered facility-administered medications on file.     Review of Systems   Neurological:  Positive for focal weakness. Negative for sensory change, speech change, seizures and headaches.   All other systems reviewed and are negative.    Objective:     Exam:  BP 96/70   Pulse 70   Ht 1.803 m (5' 11\")   Wt 92.4 kg (203 lb 9.6 oz)   SpO2 94%   BMI 28.40 kg/m²  Body mass index is 28.4 kg/m².    Physical Exam  Constitutional:       General: He is not in acute distress.     Appearance: Normal appearance. He is not toxic-appearing.   Eyes:      General: No visual field deficit.  Cardiovascular:      Rate and Rhythm: Normal rate and regular rhythm.      Pulses: Normal pulses.      Heart sounds: Normal heart sounds. No murmur " heard.  Pulmonary:      Effort: Pulmonary effort is normal. No respiratory distress.      Breath sounds: Normal breath sounds.   Neurological:      Mental Status: He is alert and oriented to person, place, and time.      Cranial Nerves: Cranial nerves 2-12 are intact. No cranial nerve deficit or facial asymmetry.      Sensory: Sensation is intact.      Motor: Weakness (Left-sided hemiparesis) and abnormal muscle tone present.      Coordination: Coordination normal.       Results: Reviewed results of CBC, CMP from 4/11/2024  Results  Laboratory Studies  Kidney and liver function tests were normal. Blood counts were normal.    Testing  EKG Interpretation   Ordered and interpreted by Gwendolyn Cedeño MD  Rhythm: normal sinus   Rate: normal. 64 bpm   Axis: normal   Ectopy: none   Conduction: normal   ST Segments: no acute change   T Waves: no acute change   Q Waves: none   Clinical Impression: no acute changes and normal EKG    Assessment & Plan:   Keanu  is a pleasant 29 y.o. male with the following -   Assessment & Plan  1. Preoperative evaluation.  The patient's EKG is within normal limits. His kidney, liver function test, and blood counts were normal from 04/2024.  His RCRI score is 0 3.9% risk of death, MI or cardiac arrest.  He does not have any modifiable risk factors.  He has seen his oncologist and they advised that it was safe for him to go through the procedure. He will continue taking his Alectinib and Keppra up until the day of the surgery and he will resume it immediately after the procedure. He will stop taking any NSAIDs 7 days prior to the surgery. He will stop any over-the-counter vitamins or supplements 2 weeks prior to the procedure. Due to his history of seizures, it is recommended that he undergoes stated above surgery in a hospital settings as he will require observation.     2. Avascular necrosis of the left hip.  This was incidentally diagnosed. The patient is to undergo cord  decompression of the head of the left femur under general anesthesia.  Pending bone density, follow-up to discuss bisphosphonate therapy.    3. Non- small cell lung cancer.  He is currently on alectinib 600 mg twice daily. He follows closely with oncology. Last appointment was on 07/17/2024. I reviewed oncology notes. He may proceed with surgical procedure and no contraindication between alectinib and oral bisphosphonate.    Follow-up  The patient will follow up after bone density scan to discuss bisphosphonate therapy.    Problem List Items Addressed This Visit       Malignant neoplasm metastatic to brain (HCC) (Chronic)     Other Visit Diagnoses       Preoperative cardiovascular examination        Relevant Orders    EKG - Clinic Performed    Avascular necrosis of bone of left hip (HCC)        Relevant Orders    DS-BONE DENSITY STUDY (DEXA)    At high risk for osteoporosis        Relevant Orders    DS-BONE DENSITY STUDY (DEXA)    High risk medication use        Relevant Orders    DS-BONE DENSITY STUDY (DEXA)    Localized osteoporosis ([place])        Relevant Orders    DS-BONE DENSITY STUDY (DEXA)          Return if symptoms worsen or fail to improve.    Please note that this dictation was created using voice recognition software. I have made every reasonable attempt to correct obvious errors, but I expect that there are errors of grammar and possibly content that I did not discover before finalizing the note.

## 2024-09-10 ENCOUNTER — HOSPITAL ENCOUNTER (OUTPATIENT)
Dept: RADIOLOGY | Facility: MEDICAL CENTER | Age: 29
End: 2024-09-10
Attending: INTERNAL MEDICINE
Payer: MEDICARE

## 2024-09-10 DIAGNOSIS — C79.49 SECONDARY MALIGNANT NEOPLASM OF BRAIN AND SPINAL CORD (HCC): ICD-10-CM

## 2024-09-10 DIAGNOSIS — C79.31 SECONDARY MALIGNANT NEOPLASM OF BRAIN AND SPINAL CORD (HCC): ICD-10-CM

## 2024-09-10 DIAGNOSIS — C34.90 MALIGNANT NEOPLASM OF BRONCHUS AND LUNG (HCC): ICD-10-CM

## 2024-09-10 PROCEDURE — 71260 CT THORAX DX C+: CPT

## 2024-09-10 PROCEDURE — 700117 HCHG RX CONTRAST REV CODE 255: Performed by: INTERNAL MEDICINE

## 2024-09-10 PROCEDURE — 700117 HCHG RX CONTRAST REV CODE 255: Mod: JZ | Performed by: INTERNAL MEDICINE

## 2024-09-10 PROCEDURE — A9579 GAD-BASE MR CONTRAST NOS,1ML: HCPCS | Mod: JZ | Performed by: INTERNAL MEDICINE

## 2024-09-10 PROCEDURE — 70553 MRI BRAIN STEM W/O & W/DYE: CPT

## 2024-09-10 RX ADMIN — IOHEXOL 100 ML: 350 INJECTION, SOLUTION INTRAVENOUS at 17:45

## 2024-09-10 RX ADMIN — IOHEXOL 25 ML: 240 INJECTION, SOLUTION INTRATHECAL; INTRAVASCULAR; INTRAVENOUS; ORAL at 17:45

## 2024-09-10 RX ADMIN — GADOTERIDOL 20 ML: 279.3 INJECTION, SOLUTION INTRAVENOUS at 17:40

## 2024-09-12 ENCOUNTER — APPOINTMENT (OUTPATIENT)
Dept: ADMISSIONS | Facility: MEDICAL CENTER | Age: 29
End: 2024-09-12
Attending: ORTHOPAEDIC SURGERY
Payer: MEDICARE

## 2024-09-18 ENCOUNTER — PRE-ADMISSION TESTING (OUTPATIENT)
Dept: ADMISSIONS | Facility: MEDICAL CENTER | Age: 29
End: 2024-09-18
Attending: ORTHOPAEDIC SURGERY
Payer: MEDICARE

## 2024-09-18 NOTE — PREPROCEDURE INSTRUCTIONS
" Reviewed highlights of \"Preparing for your procedure\" over the phone. Verified received email from pre-admit with \"Preparing for your Procedure\", \"fasting\", \"Pain management\", \"Patient Safety\", \"Speak-up\" handouts and map. Agreed with surgical site listed on consent.   "

## 2024-09-26 ENCOUNTER — HOSPITAL ENCOUNTER (OUTPATIENT)
Dept: RADIOLOGY | Facility: MEDICAL CENTER | Age: 29
End: 2024-09-26
Attending: INTERNAL MEDICINE
Payer: COMMERCIAL

## 2024-09-26 DIAGNOSIS — C34.90 SMALL CELL LUNG CANCER (HCC): ICD-10-CM

## 2024-09-26 PROCEDURE — 93970 EXTREMITY STUDY: CPT

## 2024-09-26 PROCEDURE — 93970 EXTREMITY STUDY: CPT | Mod: 26 | Performed by: INTERNAL MEDICINE

## 2024-10-03 ENCOUNTER — ANESTHESIA EVENT (OUTPATIENT)
Dept: SURGERY | Facility: MEDICAL CENTER | Age: 29
End: 2024-10-03
Payer: COMMERCIAL

## 2024-10-03 ENCOUNTER — ANESTHESIA (OUTPATIENT)
Dept: SURGERY | Facility: MEDICAL CENTER | Age: 29
End: 2024-10-03
Payer: COMMERCIAL

## 2024-10-03 ENCOUNTER — APPOINTMENT (OUTPATIENT)
Dept: RADIOLOGY | Facility: MEDICAL CENTER | Age: 29
End: 2024-10-03
Attending: ORTHOPAEDIC SURGERY
Payer: COMMERCIAL

## 2024-10-03 VITALS
WEIGHT: 204.81 LBS | DIASTOLIC BLOOD PRESSURE: 94 MMHG | BODY MASS INDEX: 28.67 KG/M2 | SYSTOLIC BLOOD PRESSURE: 147 MMHG | HEIGHT: 71 IN | RESPIRATION RATE: 16 BRPM | TEMPERATURE: 97.7 F | OXYGEN SATURATION: 96 % | HEART RATE: 60 BPM

## 2024-10-03 PROCEDURE — 700111 HCHG RX REV CODE 636 W/ 250 OVERRIDE (IP): Performed by: ANESTHESIOLOGY

## 2024-10-03 PROCEDURE — 73552 X-RAY EXAM OF FEMUR 2/>: CPT | Mod: LT

## 2024-10-03 PROCEDURE — 160025 RECOVERY II MINUTES (STATS): Performed by: ORTHOPAEDIC SURGERY

## 2024-10-03 PROCEDURE — 700111 HCHG RX REV CODE 636 W/ 250 OVERRIDE (IP): Mod: JZ | Performed by: ANESTHESIOLOGY

## 2024-10-03 PROCEDURE — 160036 HCHG PACU - EA ADDL 30 MINS PHASE I: Performed by: ORTHOPAEDIC SURGERY

## 2024-10-03 PROCEDURE — 160009 HCHG ANES TIME/MIN: Performed by: ORTHOPAEDIC SURGERY

## 2024-10-03 PROCEDURE — 700101 HCHG RX REV CODE 250: Performed by: ANESTHESIOLOGY

## 2024-10-03 PROCEDURE — 700101 HCHG RX REV CODE 250: Performed by: ORTHOPAEDIC SURGERY

## 2024-10-03 PROCEDURE — 160041 HCHG SURGERY MINUTES - EA ADDL 1 MIN LEVEL 4: Performed by: ORTHOPAEDIC SURGERY

## 2024-10-03 PROCEDURE — 27299 UNLISTED PX PELVIS/HIP JOINT: CPT | Mod: LT | Performed by: ORTHOPAEDIC SURGERY

## 2024-10-03 PROCEDURE — 160048 HCHG OR STATISTICAL LEVEL 1-5: Performed by: ORTHOPAEDIC SURGERY

## 2024-10-03 PROCEDURE — 160002 HCHG RECOVERY MINUTES (STAT): Performed by: ORTHOPAEDIC SURGERY

## 2024-10-03 PROCEDURE — A9270 NON-COVERED ITEM OR SERVICE: HCPCS | Performed by: ANESTHESIOLOGY

## 2024-10-03 PROCEDURE — 160029 HCHG SURGERY MINUTES - 1ST 30 MINS LEVEL 4: Performed by: ORTHOPAEDIC SURGERY

## 2024-10-03 PROCEDURE — 700111 HCHG RX REV CODE 636 W/ 250 OVERRIDE (IP): Performed by: ORTHOPAEDIC SURGERY

## 2024-10-03 PROCEDURE — 700102 HCHG RX REV CODE 250 W/ 637 OVERRIDE(OP): Performed by: ANESTHESIOLOGY

## 2024-10-03 PROCEDURE — 160046 HCHG PACU - 1ST 60 MINS PHASE II: Performed by: ORTHOPAEDIC SURGERY

## 2024-10-03 PROCEDURE — 700105 HCHG RX REV CODE 258: Performed by: ORTHOPAEDIC SURGERY

## 2024-10-03 PROCEDURE — 160035 HCHG PACU - 1ST 60 MINS PHASE I: Performed by: ORTHOPAEDIC SURGERY

## 2024-10-03 RX ORDER — DIPHENHYDRAMINE HYDROCHLORIDE 50 MG/ML
12.5 INJECTION INTRAMUSCULAR; INTRAVENOUS
Status: DISCONTINUED | OUTPATIENT
Start: 2024-10-03 | End: 2024-10-03 | Stop reason: HOSPADM

## 2024-10-03 RX ORDER — HYDROMORPHONE HYDROCHLORIDE 1 MG/ML
0.2 INJECTION, SOLUTION INTRAMUSCULAR; INTRAVENOUS; SUBCUTANEOUS
Status: DISCONTINUED | OUTPATIENT
Start: 2024-10-03 | End: 2024-10-03 | Stop reason: HOSPADM

## 2024-10-03 RX ORDER — CEFAZOLIN SODIUM 1 G/3ML
2 INJECTION, POWDER, FOR SOLUTION INTRAMUSCULAR; INTRAVENOUS ONCE
Status: COMPLETED | OUTPATIENT
Start: 2024-10-03 | End: 2024-10-03

## 2024-10-03 RX ORDER — OXYCODONE HCL 5 MG/5 ML
5 SOLUTION, ORAL ORAL
Status: COMPLETED | OUTPATIENT
Start: 2024-10-03 | End: 2024-10-03

## 2024-10-03 RX ORDER — CELECOXIB 200 MG/1
400 CAPSULE ORAL ONCE
Status: COMPLETED | OUTPATIENT
Start: 2024-10-03 | End: 2024-10-03

## 2024-10-03 RX ORDER — HALOPERIDOL 5 MG/ML
1 INJECTION INTRAMUSCULAR
Status: DISCONTINUED | OUTPATIENT
Start: 2024-10-03 | End: 2024-10-03 | Stop reason: HOSPADM

## 2024-10-03 RX ORDER — HYDRALAZINE HYDROCHLORIDE 20 MG/ML
5 INJECTION INTRAMUSCULAR; INTRAVENOUS
Status: DISCONTINUED | OUTPATIENT
Start: 2024-10-03 | End: 2024-10-03 | Stop reason: HOSPADM

## 2024-10-03 RX ORDER — OXYCODONE AND ACETAMINOPHEN 5; 325 MG/1; MG/1
1 TABLET ORAL EVERY 4 HOURS PRN
Qty: 42 TABLET | Refills: 0 | Status: SHIPPED | OUTPATIENT
Start: 2024-10-03 | End: 2024-10-10

## 2024-10-03 RX ORDER — ALBUTEROL SULFATE 5 MG/ML
2.5 SOLUTION RESPIRATORY (INHALATION)
Status: DISCONTINUED | OUTPATIENT
Start: 2024-10-03 | End: 2024-10-03 | Stop reason: HOSPADM

## 2024-10-03 RX ORDER — MIDAZOLAM HYDROCHLORIDE 1 MG/ML
INJECTION INTRAMUSCULAR; INTRAVENOUS PRN
Status: DISCONTINUED | OUTPATIENT
Start: 2024-10-03 | End: 2024-10-03 | Stop reason: SURG

## 2024-10-03 RX ORDER — SODIUM CHLORIDE, SODIUM LACTATE, POTASSIUM CHLORIDE, CALCIUM CHLORIDE 600; 310; 30; 20 MG/100ML; MG/100ML; MG/100ML; MG/100ML
INJECTION, SOLUTION INTRAVENOUS CONTINUOUS
Status: DISCONTINUED | OUTPATIENT
Start: 2024-10-03 | End: 2024-10-03 | Stop reason: HOSPADM

## 2024-10-03 RX ORDER — OXYCODONE HCL 5 MG/5 ML
10 SOLUTION, ORAL ORAL
Status: COMPLETED | OUTPATIENT
Start: 2024-10-03 | End: 2024-10-03

## 2024-10-03 RX ORDER — ONDANSETRON 2 MG/ML
INJECTION INTRAMUSCULAR; INTRAVENOUS PRN
Status: DISCONTINUED | OUTPATIENT
Start: 2024-10-03 | End: 2024-10-03 | Stop reason: SURG

## 2024-10-03 RX ORDER — ONDANSETRON 2 MG/ML
4 INJECTION INTRAMUSCULAR; INTRAVENOUS
Status: DISCONTINUED | OUTPATIENT
Start: 2024-10-03 | End: 2024-10-03 | Stop reason: HOSPADM

## 2024-10-03 RX ORDER — MEPERIDINE HYDROCHLORIDE 25 MG/ML
12.5 INJECTION INTRAMUSCULAR; INTRAVENOUS; SUBCUTANEOUS
Status: DISCONTINUED | OUTPATIENT
Start: 2024-10-03 | End: 2024-10-03 | Stop reason: HOSPADM

## 2024-10-03 RX ORDER — HYDROMORPHONE HYDROCHLORIDE 1 MG/ML
0.4 INJECTION, SOLUTION INTRAMUSCULAR; INTRAVENOUS; SUBCUTANEOUS
Status: DISCONTINUED | OUTPATIENT
Start: 2024-10-03 | End: 2024-10-03 | Stop reason: HOSPADM

## 2024-10-03 RX ORDER — HYDROMORPHONE HYDROCHLORIDE 1 MG/ML
0.5 INJECTION, SOLUTION INTRAMUSCULAR; INTRAVENOUS; SUBCUTANEOUS
Status: DISCONTINUED | OUTPATIENT
Start: 2024-10-03 | End: 2024-10-03 | Stop reason: HOSPADM

## 2024-10-03 RX ORDER — BUPIVACAINE HYDROCHLORIDE AND EPINEPHRINE 5; 5 MG/ML; UG/ML
INJECTION, SOLUTION PERINEURAL
Status: DISCONTINUED | OUTPATIENT
Start: 2024-10-03 | End: 2024-10-03 | Stop reason: HOSPADM

## 2024-10-03 RX ORDER — LIDOCAINE HYDROCHLORIDE 20 MG/ML
INJECTION, SOLUTION EPIDURAL; INFILTRATION; INTRACAUDAL; PERINEURAL PRN
Status: DISCONTINUED | OUTPATIENT
Start: 2024-10-03 | End: 2024-10-03 | Stop reason: SURG

## 2024-10-03 RX ORDER — ACETAMINOPHEN 500 MG
1000 TABLET ORAL ONCE
Status: COMPLETED | OUTPATIENT
Start: 2024-10-03 | End: 2024-10-03

## 2024-10-03 RX ORDER — DEXAMETHASONE SODIUM PHOSPHATE 4 MG/ML
INJECTION, SOLUTION INTRA-ARTICULAR; INTRALESIONAL; INTRAMUSCULAR; INTRAVENOUS; SOFT TISSUE PRN
Status: DISCONTINUED | OUTPATIENT
Start: 2024-10-03 | End: 2024-10-03 | Stop reason: SURG

## 2024-10-03 RX ORDER — HYDROMORPHONE HYDROCHLORIDE 2 MG/ML
INJECTION, SOLUTION INTRAMUSCULAR; INTRAVENOUS; SUBCUTANEOUS PRN
Status: DISCONTINUED | OUTPATIENT
Start: 2024-10-03 | End: 2024-10-03 | Stop reason: SURG

## 2024-10-03 RX ADMIN — FENTANYL CITRATE 50 MCG: 50 INJECTION, SOLUTION INTRAMUSCULAR; INTRAVENOUS at 14:58

## 2024-10-03 RX ADMIN — CEFAZOLIN 2 G: 1 INJECTION, POWDER, FOR SOLUTION INTRAMUSCULAR; INTRAVENOUS at 13:42

## 2024-10-03 RX ADMIN — SODIUM CHLORIDE, POTASSIUM CHLORIDE, SODIUM LACTATE AND CALCIUM CHLORIDE: 600; 310; 30; 20 INJECTION, SOLUTION INTRAVENOUS at 12:31

## 2024-10-03 RX ADMIN — LIDOCAINE HYDROCHLORIDE 100 MG: 20 INJECTION, SOLUTION EPIDURAL; INFILTRATION; INTRACAUDAL at 13:46

## 2024-10-03 RX ADMIN — OXYCODONE HYDROCHLORIDE 10 MG: 5 SOLUTION ORAL at 14:58

## 2024-10-03 RX ADMIN — MIDAZOLAM HYDROCHLORIDE 2 MG: 2 INJECTION, SOLUTION INTRAMUSCULAR; INTRAVENOUS at 13:42

## 2024-10-03 RX ADMIN — HYDROMORPHONE HYDROCHLORIDE 1 MG: 2 INJECTION INTRAMUSCULAR; INTRAVENOUS; SUBCUTANEOUS at 13:46

## 2024-10-03 RX ADMIN — FENTANYL CITRATE 50 MCG: 50 INJECTION, SOLUTION INTRAMUSCULAR; INTRAVENOUS at 15:30

## 2024-10-03 RX ADMIN — ONDANSETRON 4 MG: 2 INJECTION INTRAMUSCULAR; INTRAVENOUS at 14:08

## 2024-10-03 RX ADMIN — DEXAMETHASONE SODIUM PHOSPHATE 8 MG: 4 INJECTION INTRA-ARTICULAR; INTRALESIONAL; INTRAMUSCULAR; INTRAVENOUS; SOFT TISSUE at 13:48

## 2024-10-03 RX ADMIN — PROPOFOL 200 MG: 10 INJECTION, EMULSION INTRAVENOUS at 13:46

## 2024-10-03 RX ADMIN — CELECOXIB 400 MG: 200 CAPSULE ORAL at 12:35

## 2024-10-03 RX ADMIN — ACETAMINOPHEN 1000 MG: 500 TABLET ORAL at 12:36

## 2024-10-03 ASSESSMENT — PAIN DESCRIPTION - PAIN TYPE
TYPE: SURGICAL PAIN

## 2024-10-03 ASSESSMENT — PAIN SCALES - GENERAL: PAIN_LEVEL: 5

## 2024-11-20 ENCOUNTER — APPOINTMENT (OUTPATIENT)
Dept: RADIOLOGY | Facility: MEDICAL CENTER | Age: 29
End: 2024-11-20
Attending: INTERNAL MEDICINE
Payer: COMMERCIAL

## 2024-11-20 DIAGNOSIS — Z85.89 HISTORY OF MALIGNANT NEOPLASM OF HEAD AND NECK: ICD-10-CM

## 2024-11-20 DIAGNOSIS — C34.90 MALIGNANT NEOPLASM OF BRONCHUS AND LUNG (HCC): ICD-10-CM

## 2024-11-20 PROCEDURE — 71260 CT THORAX DX C+: CPT

## 2024-11-20 PROCEDURE — 700117 HCHG RX CONTRAST REV CODE 255

## 2024-11-20 RX ADMIN — IOHEXOL 100 ML: 350 INJECTION, SOLUTION INTRAVENOUS at 09:53

## 2024-11-20 RX ADMIN — IOHEXOL 25 ML: 240 INJECTION, SOLUTION INTRATHECAL; INTRAVASCULAR; INTRAVENOUS; ORAL at 09:56

## 2024-12-15 ENCOUNTER — HOSPITAL ENCOUNTER (OUTPATIENT)
Dept: RADIOLOGY | Facility: MEDICAL CENTER | Age: 29
End: 2024-12-15
Attending: INTERNAL MEDICINE
Payer: COMMERCIAL

## 2024-12-15 DIAGNOSIS — C34.90 NON-SMALL CELL LUNG CANCER, UNSPECIFIED LATERALITY (HCC): ICD-10-CM

## 2024-12-15 DIAGNOSIS — Z85.89 HISTORY OF MALIGNANT NEOPLASM METASTATIC TO BRAIN: ICD-10-CM

## 2024-12-15 PROCEDURE — 700117 HCHG RX CONTRAST REV CODE 255: Mod: JZ | Performed by: INTERNAL MEDICINE

## 2024-12-15 PROCEDURE — A9579 GAD-BASE MR CONTRAST NOS,1ML: HCPCS | Mod: JZ | Performed by: INTERNAL MEDICINE

## 2024-12-15 PROCEDURE — 70553 MRI BRAIN STEM W/O & W/DYE: CPT

## 2024-12-15 RX ADMIN — GADOTERIDOL 18 ML: 279.3 INJECTION, SOLUTION INTRAVENOUS at 09:24

## 2025-01-08 ENCOUNTER — HOSPITAL ENCOUNTER (OUTPATIENT)
Dept: LAB | Facility: MEDICAL CENTER | Age: 30
End: 2025-01-08
Payer: COMMERCIAL

## 2025-01-08 LAB
BASOPHILS # BLD AUTO: 0.4 % (ref 0–1.8)
BASOPHILS # BLD: 0.02 K/UL (ref 0–0.12)
EOSINOPHIL # BLD AUTO: 0.12 K/UL (ref 0–0.51)
EOSINOPHIL NFR BLD: 2.7 % (ref 0–6.9)
ERYTHROCYTE [DISTWIDTH] IN BLOOD BY AUTOMATED COUNT: 38.8 FL (ref 35.9–50)
HCT VFR BLD AUTO: 40.1 % (ref 42–52)
HGB BLD-MCNC: 13.8 G/DL (ref 14–18)
IMM GRANULOCYTES # BLD AUTO: 0.09 K/UL (ref 0–0.11)
IMM GRANULOCYTES NFR BLD AUTO: 2 % (ref 0–0.9)
LYMPHOCYTES # BLD AUTO: 1.71 K/UL (ref 1–4.8)
LYMPHOCYTES NFR BLD: 38.3 % (ref 22–41)
MCH RBC QN AUTO: 29.5 PG (ref 27–33)
MCHC RBC AUTO-ENTMCNC: 34.4 G/DL (ref 32.3–36.5)
MCV RBC AUTO: 85.7 FL (ref 81.4–97.8)
MONOCYTES # BLD AUTO: 0.38 K/UL (ref 0–0.85)
MONOCYTES NFR BLD AUTO: 8.5 % (ref 0–13.4)
NEUTROPHILS # BLD AUTO: 2.15 K/UL (ref 1.82–7.42)
NEUTROPHILS NFR BLD: 48.1 % (ref 44–72)
NRBC # BLD AUTO: 0 K/UL
NRBC BLD-RTO: 0 /100 WBC (ref 0–0.2)
PLATELET # BLD AUTO: 257 K/UL (ref 164–446)
PMV BLD AUTO: 9.1 FL (ref 9–12.9)
RBC # BLD AUTO: 4.68 M/UL (ref 4.7–6.1)
WBC # BLD AUTO: 4.5 K/UL (ref 4.8–10.8)

## 2025-01-08 PROCEDURE — 82550 ASSAY OF CK (CPK): CPT

## 2025-01-08 PROCEDURE — 85025 COMPLETE CBC W/AUTO DIFF WBC: CPT

## 2025-01-08 PROCEDURE — 80053 COMPREHEN METABOLIC PANEL: CPT

## 2025-01-08 PROCEDURE — 36415 COLL VENOUS BLD VENIPUNCTURE: CPT

## 2025-01-09 LAB
ALBUMIN SERPL BCP-MCNC: 4.5 G/DL (ref 3.2–4.9)
ALBUMIN/GLOB SERPL: 1.6 G/DL
ALP SERPL-CCNC: 87 U/L (ref 30–99)
ALT SERPL-CCNC: 44 U/L (ref 2–50)
ANION GAP SERPL CALC-SCNC: 10 MMOL/L (ref 7–16)
AST SERPL-CCNC: 48 U/L (ref 12–45)
BILIRUB SERPL-MCNC: 0.4 MG/DL (ref 0.1–1.5)
BUN SERPL-MCNC: 20 MG/DL (ref 8–22)
CALCIUM ALBUM COR SERPL-MCNC: 8.6 MG/DL (ref 8.5–10.5)
CALCIUM SERPL-MCNC: 9 MG/DL (ref 8.5–10.5)
CHLORIDE SERPL-SCNC: 102 MMOL/L (ref 96–112)
CK SERPL-CCNC: 772 U/L (ref 0–154)
CO2 SERPL-SCNC: 27 MMOL/L (ref 20–33)
CREAT SERPL-MCNC: 0.93 MG/DL (ref 0.5–1.4)
GFR SERPLBLD CREATININE-BSD FMLA CKD-EPI: 113 ML/MIN/1.73 M 2
GLOBULIN SER CALC-MCNC: 2.9 G/DL (ref 1.9–3.5)
GLUCOSE SERPL-MCNC: 110 MG/DL (ref 65–99)
POTASSIUM SERPL-SCNC: 3.7 MMOL/L (ref 3.6–5.5)
PROT SERPL-MCNC: 7.4 G/DL (ref 6–8.2)
SODIUM SERPL-SCNC: 139 MMOL/L (ref 135–145)

## (undated) DEVICE — DRAPE STRLE REG TOWEL 18X24 - (10/BX 4BX/CA)"

## (undated) DEVICE — BANDAGE ROLL STERILE BULKEE 4.5 IN X 4 YD (100EA/CA)

## (undated) DEVICE — DETERGENT RENUZYME PLUS 10 OZ PACKET (50/BX)

## (undated) DEVICE — CANISTER SUCTION RIGID RED 1500CC (40EA/CA)

## (undated) DEVICE — SPONGE GAUZESTER 4 X 4 4PLY - (128PK/CA)

## (undated) DEVICE — NEEDLE NON SAFETY HYPO 22 GA X 1 1/2 IN (100/BX)

## (undated) DEVICE — SLEEVE STERILE  A599T - 30/BX 2BX/CS

## (undated) DEVICE — TUBING C&T SET FLYING LEADS DRAIN TUBING (10EA/BX)

## (undated) DEVICE — DISSECT TOOL MIDAS F2/8TA23

## (undated) DEVICE — KIT ROOM DECONTAMINATION

## (undated) DEVICE — DRESSING XEROFORM 1X8 - (50/BX 4BX/CA)

## (undated) DEVICE — GLOVE BIOGEL SZ 7.5 SURGICAL PF LTX - (50PR/BX 4BX/CA)

## (undated) DEVICE — GLOVE BIOGEL INDICATOR SZ 8 SURGICAL PF LTX - (50/BX 4BX/CA)

## (undated) DEVICE — LACTATED RINGERS INJ. 500 ML - (24EA/CA)

## (undated) DEVICE — GLOVE BIOGEL SZ 6.5 SURGICAL PF LTX (50PR/BX 4BX/CA)

## (undated) DEVICE — STAPLER SKIN DISP - (6/BX 10BX/CA) VISISTAT

## (undated) DEVICE — PROTECTOR ULNA NERVE - (36PR/CA)

## (undated) DEVICE — GLOVE SZ 7 BIOGEL PI MICRO - PF LF (50PR/BX 4BX/CA)

## (undated) DEVICE — SODIUM CHL IRRIGATION 0.9% 1000ML (12EA/CA)

## (undated) DEVICE — SUTURE 2-0 VICRYL PLUS CT-1 - 8 X 18 INCH(12/BX)

## (undated) DEVICE — BLADE CLIPPER FITS 2501 CLIPPER (BLUE)  (20EA/CA)

## (undated) DEVICE — LACTATED RINGERS INJ 1000 ML - (14EA/CA 60CA/PF)

## (undated) DEVICE — SENSOR SPO2 NEO LNCS ADHESIVE (20/BX) SEE USER NOTES

## (undated) DEVICE — ELECTRODE 850 FOAM ADHESIVE - HYDROGEL RADIOTRNSPRNT (50/PK)

## (undated) DEVICE — SURGIFOAM (SIZE 100) - (6EA/CA)

## (undated) DEVICE — COVER LIGHT HANDLE FLEXIBLE - SOFT (2EA/PK 80PK/CA)

## (undated) DEVICE — SET EXTENSION WITH 2 PORTS (48EA/CA) ***PART #2C8610 IS A SUBSTITUTE*****

## (undated) DEVICE — PACK MINOR BASIN - (2EA/CA)

## (undated) DEVICE — GOWN SURGEONS X-LARGE - DISP. (30/CA)

## (undated) DEVICE — KIT ANESTHESIA W/CIRCUIT & 3/LT BAG W/FILTER (20EA/CA)

## (undated) DEVICE — TOWELS CLOTH SURGICAL - (4/PK 20PK/CA)

## (undated) DEVICE — SYRINGE ASEPTO - (50EA/CA

## (undated) DEVICE — Device

## (undated) DEVICE — PATTIES SURG X-RAYCOTTONOID - 1/2 X 3 IN (200/CA)

## (undated) DEVICE — DISSECT TOOL MIDAS 14BA50DX

## (undated) DEVICE — NEPTUNE 4 PORT MANIFOLD - (20/PK)

## (undated) DEVICE — WATER IRRIGATION STERILE 1000ML (12EA/CA)

## (undated) DEVICE — GLOVE, BIOGEL ECLIPSE, SZ 7.0, PF LTX (50/BX)

## (undated) DEVICE — SUTURE 4-0 NUROLON CR/8 TF - (12/BX) ETHICON

## (undated) DEVICE — TUBING CLEARLINK DUO-VENT - C-FLO (48EA/CA)

## (undated) DEVICE — CHLORAPREP 26 ML APPLICATOR - ORANGE TINT(25/CA)

## (undated) DEVICE — GLOVE BIOGEL INDICATOR SZ 7.5 SURGICAL PF LTX - (50PR/BX 4BX/CA)

## (undated) DEVICE — PATTIES SURG NEURO X-RAY 1/2X1/2 (10EA/PK 20PK/CS)

## (undated) DEVICE — ELECTRODE DUAL RETURN W/ CORD - (50/PK)

## (undated) DEVICE — CONTAINER SPECIMEN BAG OR - STERILE 4 OZ W/LID (100EA/CA)

## (undated) DEVICE — INTRAOP NEURO IN OR 1:1 PER 15 MIN

## (undated) DEVICE — SENSOR OXIMETER ADULT SPO2 RD SET (20EA/BX)

## (undated) DEVICE — PEN SKIN MARKER W/RULER - (50EA/BX)

## (undated) DEVICE — HUMID-VENT HEAT AND MOISTURE EXCHANGE- (50/BX)

## (undated) DEVICE — SET LEADWIRE 5 LEAD BEDSIDE DISPOSABLE ECG (1SET OF 5/EA)

## (undated) DEVICE — MASK ANESTHESIA ADULT  - (100/CA)

## (undated) DEVICE — TUBE CONNECTING SUCTION - CLEAR PLASTIC STERILE 72 IN (50EA/CA)

## (undated) DEVICE — SPHERE NAVIGATION STEALTH (5EA/TY 12TY/PK)

## (undated) DEVICE — SCALP CLIP RANEY 20-1037 (10EA/PK 20PK/CA)

## (undated) DEVICE — GLOVE BIOGEL ECLIPSE PF LATEX SIZE 8.0  (50PR/BX)

## (undated) DEVICE — KIT SURGIFLO W/OUT THROMBIN - (6EA/CA)

## (undated) DEVICE — DISSECT TOOL MIDAS REX - (M-2)

## (undated) DEVICE — TOWEL STOP TIMEOUT SAFETY FLAG (40EA/CA)

## (undated) DEVICE — TRAY CATHETER FOLEY URINE METER W/STATLOCK 350ML (10EA/CA)

## (undated) DEVICE — PERFORATER DISP TIP DGR-0

## (undated) DEVICE — SUTURE 3-0 VICRYL PLUS SH - 8X 18 INCH (12/BX)

## (undated) DEVICE — SUCTION INSTRUMENT YANKAUER BULBOUS TIP W/O VENT (50EA/CA)

## (undated) DEVICE — MIDAS LUBRICATOR DIFFUSER PACK (4EA/CA)

## (undated) DEVICE — CANISTER SUCTION 3000ML MECHANICAL FILTER AUTO SHUTOFF MEDI-VAC NONSTERILE LF DISP  (40EA/CA)

## (undated) DEVICE — DRAPE SURGICAL U 77X120 - (10/CA)

## (undated) DEVICE — PATTIES SURG NEURO X-RAY 1/4X1/4 (10EA/PK 20PK/CA)

## (undated) DEVICE — PAD LAP STERILE 18 X 18 - (5/PK 40PK/CA)

## (undated) DEVICE — SUTURE GENERAL

## (undated) DEVICE — PIN HEAD MAYFIELD DISP. (3EA/PK 12PK/BX)

## (undated) DEVICE — PATTIES SURG X-RAYCOTTONOID - 1 X 3 IN (200/CA)

## (undated) DEVICE — BAG SPONGE COUNT 10.25 X 32 - BLUE (250/CA)

## (undated) DEVICE — TRAY SRGPRP PVP IOD WT PRP - (20/CA)

## (undated) DEVICE — PATTIES SURG NEURO X-RAY 1X1 (10EA/PK 20PK/CA)

## (undated) DEVICE — GLOVE BIOGEL SZ 8 SURGICAL PF LTX - (50PR/BX 4BX/CA)

## (undated) DEVICE — HEMOSTAT SURG ABSORBABLE - 2 X 3 IN SURGICEL (24EA/CA)

## (undated) DEVICE — GOWN WARMING STANDARD FLEX - (30/CA)

## (undated) DEVICE — PACK CRANI - (1EA/CA)

## (undated) DEVICE — WATER IRRIG. STER. 1500 ML - (9/CA)

## (undated) DEVICE — BOVIE BLADE COATED &INSULATED - 25/PK